# Patient Record
Sex: MALE | Race: WHITE | NOT HISPANIC OR LATINO | Employment: OTHER | URBAN - METROPOLITAN AREA
[De-identification: names, ages, dates, MRNs, and addresses within clinical notes are randomized per-mention and may not be internally consistent; named-entity substitution may affect disease eponyms.]

---

## 2023-12-22 ENCOUNTER — HOSPITAL ENCOUNTER (OUTPATIENT)
Facility: OTHER | Age: 88
Discharge: HOME | End: 2023-12-26
Attending: EMERGENCY MEDICINE | Admitting: HOSPITALIST
Payer: MEDICARE

## 2023-12-22 DIAGNOSIS — R33.9 URINARY RETENTION: ICD-10-CM

## 2023-12-22 DIAGNOSIS — K62.89 RECTAL PAIN: Primary | ICD-10-CM

## 2023-12-22 DIAGNOSIS — K59.00 CONSTIPATION: ICD-10-CM

## 2023-12-22 DIAGNOSIS — R53.1 WEAKNESS: ICD-10-CM

## 2023-12-22 DIAGNOSIS — E87.1 HYPONATREMIA: ICD-10-CM

## 2023-12-22 PROBLEM — I10 PRIMARY HYPERTENSION: Status: ACTIVE | Noted: 2023-12-22

## 2023-12-22 LAB
ALBUMIN SERPL BCP-MCNC: 3.8 G/DL (ref 3.5–5.2)
ALP SERPL-CCNC: 86 U/L (ref 55–135)
ALT SERPL W/O P-5'-P-CCNC: 20 U/L (ref 10–44)
ANION GAP SERPL CALC-SCNC: 14 MMOL/L (ref 8–16)
AST SERPL-CCNC: 26 U/L (ref 10–40)
BASOPHILS # BLD AUTO: 0.03 K/UL (ref 0–0.2)
BASOPHILS NFR BLD: 0.2 % (ref 0–1.9)
BILIRUB SERPL-MCNC: 0.9 MG/DL (ref 0.1–1)
BILIRUB UR QL STRIP: NEGATIVE
BUN SERPL-MCNC: 12 MG/DL (ref 8–23)
CALCIUM SERPL-MCNC: 9.3 MG/DL (ref 8.7–10.5)
CHLORIDE SERPL-SCNC: 95 MMOL/L (ref 95–110)
CLARITY UR: CLEAR
CO2 SERPL-SCNC: 18 MMOL/L (ref 23–29)
COLOR UR: COLORLESS
CREAT SERPL-MCNC: 1.3 MG/DL (ref 0.5–1.4)
CTP QC/QA: YES
CTP QC/QA: YES
DIFFERENTIAL METHOD: ABNORMAL
EOSINOPHIL # BLD AUTO: 0 K/UL (ref 0–0.5)
EOSINOPHIL NFR BLD: 0.1 % (ref 0–8)
ERYTHROCYTE [DISTWIDTH] IN BLOOD BY AUTOMATED COUNT: 12.2 % (ref 11.5–14.5)
EST. GFR  (NO RACE VARIABLE): 52 ML/MIN/1.73 M^2
GLUCOSE SERPL-MCNC: 103 MG/DL (ref 70–110)
GLUCOSE UR QL STRIP: NEGATIVE
HCT VFR BLD AUTO: 41.2 % (ref 40–54)
HGB BLD-MCNC: 14.1 G/DL (ref 14–18)
HGB UR QL STRIP: ABNORMAL
IMM GRANULOCYTES # BLD AUTO: 0.05 K/UL (ref 0–0.04)
IMM GRANULOCYTES NFR BLD AUTO: 0.3 % (ref 0–0.5)
KETONES UR QL STRIP: NEGATIVE
LEUKOCYTE ESTERASE UR QL STRIP: NEGATIVE
LIPASE SERPL-CCNC: 59 U/L (ref 4–60)
LYMPHOCYTES # BLD AUTO: 1.6 K/UL (ref 1–4.8)
LYMPHOCYTES NFR BLD: 10.8 % (ref 18–48)
MCH RBC QN AUTO: 30.7 PG (ref 27–31)
MCHC RBC AUTO-ENTMCNC: 34.2 G/DL (ref 32–36)
MCV RBC AUTO: 90 FL (ref 82–98)
MICROSCOPIC COMMENT: NORMAL
MONOCYTES # BLD AUTO: 0.8 K/UL (ref 0.3–1)
MONOCYTES NFR BLD: 5.1 % (ref 4–15)
NEUTROPHILS # BLD AUTO: 12.3 K/UL (ref 1.8–7.7)
NEUTROPHILS NFR BLD: 83.5 % (ref 38–73)
NITRITE UR QL STRIP: NEGATIVE
NRBC BLD-RTO: 0 /100 WBC
PH UR STRIP: 7 [PH] (ref 5–8)
PLATELET # BLD AUTO: 400 K/UL (ref 150–450)
PMV BLD AUTO: 9.5 FL (ref 9.2–12.9)
POC MOLECULAR INFLUENZA A AGN: NEGATIVE
POC MOLECULAR INFLUENZA B AGN: NEGATIVE
POCT GLUCOSE: 97 MG/DL (ref 70–110)
POTASSIUM SERPL-SCNC: 4.6 MMOL/L (ref 3.5–5.1)
PROT SERPL-MCNC: 7.2 G/DL (ref 6–8.4)
PROT UR QL STRIP: NEGATIVE
RBC # BLD AUTO: 4.59 M/UL (ref 4.6–6.2)
RBC #/AREA URNS HPF: 1 /HPF (ref 0–4)
SARS-COV-2 RDRP RESP QL NAA+PROBE: NEGATIVE
SODIUM SERPL-SCNC: 127 MMOL/L (ref 136–145)
SP GR UR STRIP: <1.005 (ref 1–1.03)
SQUAMOUS #/AREA URNS HPF: 0 /HPF
URN SPEC COLLECT METH UR: ABNORMAL
UROBILINOGEN UR STRIP-ACNC: NEGATIVE EU/DL
WBC # BLD AUTO: 14.69 K/UL (ref 3.9–12.7)
WBC #/AREA URNS HPF: 0 /HPF (ref 0–5)

## 2023-12-22 PROCEDURE — P9612 CATHETERIZE FOR URINE SPEC: HCPCS

## 2023-12-22 PROCEDURE — 51702 INSERT TEMP BLADDER CATH: CPT

## 2023-12-22 PROCEDURE — 99285 EMERGENCY DEPT VISIT HI MDM: CPT | Mod: 25

## 2023-12-22 PROCEDURE — G0378 HOSPITAL OBSERVATION PER HR: HCPCS

## 2023-12-22 PROCEDURE — 96375 TX/PRO/DX INJ NEW DRUG ADDON: CPT | Mod: 59

## 2023-12-22 PROCEDURE — 81000 URINALYSIS NONAUTO W/SCOPE: CPT | Performed by: NURSE PRACTITIONER

## 2023-12-22 PROCEDURE — 82962 GLUCOSE BLOOD TEST: CPT

## 2023-12-22 PROCEDURE — 80053 COMPREHEN METABOLIC PANEL: CPT | Performed by: NURSE PRACTITIONER

## 2023-12-22 PROCEDURE — 63600175 PHARM REV CODE 636 W HCPCS: Performed by: NURSE PRACTITIONER

## 2023-12-22 PROCEDURE — 25500020 PHARM REV CODE 255: Performed by: EMERGENCY MEDICINE

## 2023-12-22 PROCEDURE — 96361 HYDRATE IV INFUSION ADD-ON: CPT

## 2023-12-22 PROCEDURE — 83690 ASSAY OF LIPASE: CPT | Performed by: NURSE PRACTITIONER

## 2023-12-22 PROCEDURE — 87635 SARS-COV-2 COVID-19 AMP PRB: CPT | Performed by: NURSE PRACTITIONER

## 2023-12-22 PROCEDURE — 25000003 PHARM REV CODE 250: Performed by: NURSE PRACTITIONER

## 2023-12-22 PROCEDURE — 85025 COMPLETE CBC W/AUTO DIFF WBC: CPT | Performed by: NURSE PRACTITIONER

## 2023-12-22 PROCEDURE — 96361 HYDRATE IV INFUSION ADD-ON: CPT | Mod: 59

## 2023-12-22 PROCEDURE — 96374 THER/PROPH/DIAG INJ IV PUSH: CPT | Mod: 59

## 2023-12-22 PROCEDURE — 87502 INFLUENZA DNA AMP PROBE: CPT

## 2023-12-22 RX ORDER — TAMSULOSIN HYDROCHLORIDE 0.4 MG/1
0.4 CAPSULE ORAL DAILY
Status: DISCONTINUED | OUTPATIENT
Start: 2023-12-23 | End: 2023-12-26 | Stop reason: HOSPADM

## 2023-12-22 RX ORDER — KETOROLAC TROMETHAMINE 30 MG/ML
15 INJECTION, SOLUTION INTRAMUSCULAR; INTRAVENOUS
Status: COMPLETED | OUTPATIENT
Start: 2023-12-22 | End: 2023-12-22

## 2023-12-22 RX ORDER — HEPARIN SODIUM 5000 [USP'U]/ML
5000 INJECTION, SOLUTION INTRAVENOUS; SUBCUTANEOUS EVERY 8 HOURS
Status: DISCONTINUED | OUTPATIENT
Start: 2023-12-23 | End: 2023-12-26 | Stop reason: HOSPADM

## 2023-12-22 RX ORDER — TALC
6 POWDER (GRAM) TOPICAL NIGHTLY PRN
Status: DISCONTINUED | OUTPATIENT
Start: 2023-12-22 | End: 2023-12-22

## 2023-12-22 RX ORDER — ACETAMINOPHEN 325 MG/1
650 TABLET ORAL EVERY 4 HOURS PRN
Status: DISCONTINUED | OUTPATIENT
Start: 2023-12-22 | End: 2023-12-26 | Stop reason: HOSPADM

## 2023-12-22 RX ORDER — SODIUM CHLORIDE 9 MG/ML
INJECTION, SOLUTION INTRAVENOUS CONTINUOUS
Status: DISCONTINUED | OUTPATIENT
Start: 2023-12-22 | End: 2023-12-23

## 2023-12-22 RX ORDER — NALOXONE HCL 0.4 MG/ML
0.02 VIAL (ML) INJECTION
Status: DISCONTINUED | OUTPATIENT
Start: 2023-12-22 | End: 2023-12-26 | Stop reason: HOSPADM

## 2023-12-22 RX ORDER — FINASTERIDE 5 MG/1
5 TABLET, FILM COATED ORAL DAILY
Status: DISCONTINUED | OUTPATIENT
Start: 2023-12-23 | End: 2023-12-26 | Stop reason: HOSPADM

## 2023-12-22 RX ORDER — PRAVASTATIN SODIUM 20 MG/1
20 TABLET ORAL DAILY
Status: DISCONTINUED | OUTPATIENT
Start: 2023-12-23 | End: 2023-12-26 | Stop reason: HOSPADM

## 2023-12-22 RX ORDER — MORPHINE SULFATE 2 MG/ML
2 INJECTION, SOLUTION INTRAMUSCULAR; INTRAVENOUS EVERY 4 HOURS PRN
Status: DISCONTINUED | OUTPATIENT
Start: 2023-12-22 | End: 2023-12-23

## 2023-12-22 RX ORDER — SODIUM CHLORIDE 0.9 % (FLUSH) 0.9 %
10 SYRINGE (ML) INJECTION EVERY 8 HOURS PRN
Status: DISCONTINUED | OUTPATIENT
Start: 2023-12-22 | End: 2023-12-23

## 2023-12-22 RX ORDER — ONDANSETRON 2 MG/ML
4 INJECTION INTRAMUSCULAR; INTRAVENOUS EVERY 8 HOURS PRN
Status: DISCONTINUED | OUTPATIENT
Start: 2023-12-22 | End: 2023-12-26 | Stop reason: HOSPADM

## 2023-12-22 RX ORDER — AMLODIPINE BESYLATE 5 MG/1
5 TABLET ORAL 2 TIMES DAILY
Status: DISCONTINUED | OUTPATIENT
Start: 2023-12-22 | End: 2023-12-26 | Stop reason: HOSPADM

## 2023-12-22 RX ORDER — POLYETHYLENE GLYCOL 3350 17 G/17G
17 POWDER, FOR SOLUTION ORAL DAILY
Status: DISCONTINUED | OUTPATIENT
Start: 2023-12-23 | End: 2023-12-26 | Stop reason: HOSPADM

## 2023-12-22 RX ORDER — SODIUM CHLORIDE 0.9 % (FLUSH) 0.9 %
10 SYRINGE (ML) INJECTION
Status: DISCONTINUED | OUTPATIENT
Start: 2023-12-22 | End: 2023-12-23

## 2023-12-22 RX ORDER — BISACODYL 10 MG
10 SUPPOSITORY, RECTAL RECTAL DAILY PRN
Status: DISCONTINUED | OUTPATIENT
Start: 2023-12-22 | End: 2023-12-23

## 2023-12-22 RX ORDER — MORPHINE SULFATE 2 MG/ML
2 INJECTION, SOLUTION INTRAMUSCULAR; INTRAVENOUS ONCE
Status: COMPLETED | OUTPATIENT
Start: 2023-12-22 | End: 2023-12-22

## 2023-12-22 RX ORDER — NAPROXEN SODIUM 220 MG/1
81 TABLET, FILM COATED ORAL DAILY
Status: DISCONTINUED | OUTPATIENT
Start: 2023-12-23 | End: 2023-12-26 | Stop reason: HOSPADM

## 2023-12-22 RX ADMIN — IOHEXOL 75 ML: 350 INJECTION, SOLUTION INTRAVENOUS at 08:12

## 2023-12-22 RX ADMIN — SODIUM CHLORIDE: 9 INJECTION, SOLUTION INTRAVENOUS at 11:12

## 2023-12-22 RX ADMIN — KETOROLAC TROMETHAMINE 15 MG: 30 INJECTION, SOLUTION INTRAMUSCULAR at 10:12

## 2023-12-22 RX ADMIN — SODIUM CHLORIDE, POTASSIUM CHLORIDE, SODIUM LACTATE AND CALCIUM CHLORIDE 1000 ML: 600; 310; 30; 20 INJECTION, SOLUTION INTRAVENOUS at 07:12

## 2023-12-22 RX ADMIN — AMLODIPINE BESYLATE 5 MG: 5 TABLET ORAL at 11:12

## 2023-12-22 RX ADMIN — MORPHINE SULFATE 2 MG: 2 INJECTION, SOLUTION INTRAMUSCULAR; INTRAVENOUS at 09:12

## 2023-12-23 PROBLEM — R53.1 WEAKNESS: Status: ACTIVE | Noted: 2023-12-23

## 2023-12-23 PROBLEM — R33.9 URINARY RETENTION: Status: ACTIVE | Noted: 2023-12-23

## 2023-12-23 PROBLEM — K59.00 CONSTIPATION: Status: ACTIVE | Noted: 2023-12-22

## 2023-12-23 LAB
ALBUMIN SERPL BCP-MCNC: 3.6 G/DL (ref 3.5–5.2)
ALP SERPL-CCNC: 79 U/L (ref 55–135)
ALT SERPL W/O P-5'-P-CCNC: 19 U/L (ref 10–44)
ANION GAP SERPL CALC-SCNC: 10 MMOL/L (ref 8–16)
AST SERPL-CCNC: 29 U/L (ref 10–40)
BASOPHILS # BLD AUTO: 0.06 K/UL (ref 0–0.2)
BASOPHILS NFR BLD: 0.6 % (ref 0–1.9)
BILIRUB SERPL-MCNC: 0.9 MG/DL (ref 0.1–1)
BUN SERPL-MCNC: 11 MG/DL (ref 8–23)
CALCIUM SERPL-MCNC: 9.3 MG/DL (ref 8.7–10.5)
CHLORIDE SERPL-SCNC: 103 MMOL/L (ref 95–110)
CO2 SERPL-SCNC: 23 MMOL/L (ref 23–29)
CREAT SERPL-MCNC: 1.2 MG/DL (ref 0.5–1.4)
DIFFERENTIAL METHOD: ABNORMAL
EOSINOPHIL # BLD AUTO: 0 K/UL (ref 0–0.5)
EOSINOPHIL NFR BLD: 0.4 % (ref 0–8)
ERYTHROCYTE [DISTWIDTH] IN BLOOD BY AUTOMATED COUNT: 12.5 % (ref 11.5–14.5)
EST. GFR  (NO RACE VARIABLE): 57 ML/MIN/1.73 M^2
FOLATE SERPL-MCNC: 12 NG/ML (ref 4–24)
GLUCOSE SERPL-MCNC: 92 MG/DL (ref 70–110)
HCT VFR BLD AUTO: 43 % (ref 40–54)
HGB BLD-MCNC: 14.7 G/DL (ref 14–18)
IMM GRANULOCYTES # BLD AUTO: 0.03 K/UL (ref 0–0.04)
IMM GRANULOCYTES NFR BLD AUTO: 0.3 % (ref 0–0.5)
LYMPHOCYTES # BLD AUTO: 1.6 K/UL (ref 1–4.8)
LYMPHOCYTES NFR BLD: 15.3 % (ref 18–48)
MAGNESIUM SERPL-MCNC: 2.2 MG/DL (ref 1.6–2.6)
MCH RBC QN AUTO: 31.2 PG (ref 27–31)
MCHC RBC AUTO-ENTMCNC: 34.2 G/DL (ref 32–36)
MCV RBC AUTO: 91 FL (ref 82–98)
MONOCYTES # BLD AUTO: 0.9 K/UL (ref 0.3–1)
MONOCYTES NFR BLD: 8.5 % (ref 4–15)
NEUTROPHILS # BLD AUTO: 7.8 K/UL (ref 1.8–7.7)
NEUTROPHILS NFR BLD: 74.9 % (ref 38–73)
NRBC BLD-RTO: 0 /100 WBC
PHOSPHATE SERPL-MCNC: 3.3 MG/DL (ref 2.7–4.5)
PLATELET # BLD AUTO: 368 K/UL (ref 150–450)
PMV BLD AUTO: 10.1 FL (ref 9.2–12.9)
POTASSIUM SERPL-SCNC: 4 MMOL/L (ref 3.5–5.1)
PROT SERPL-MCNC: 6.5 G/DL (ref 6–8.4)
RBC # BLD AUTO: 4.71 M/UL (ref 4.6–6.2)
SODIUM SERPL-SCNC: 136 MMOL/L (ref 136–145)
T4 FREE SERPL-MCNC: 0.88 NG/DL (ref 0.71–1.51)
T4 SERPL-MCNC: 5.5 UG/DL (ref 4.5–11.5)
TSH SERPL DL<=0.005 MIU/L-ACNC: 8.78 UIU/ML (ref 0.4–4)
VIT B12 SERPL-MCNC: 731 PG/ML (ref 210–950)
WBC # BLD AUTO: 10.41 K/UL (ref 3.9–12.7)

## 2023-12-23 PROCEDURE — 97535 SELF CARE MNGMENT TRAINING: CPT

## 2023-12-23 PROCEDURE — 97110 THERAPEUTIC EXERCISES: CPT

## 2023-12-23 PROCEDURE — 82746 ASSAY OF FOLIC ACID SERUM: CPT | Performed by: NURSE PRACTITIONER

## 2023-12-23 PROCEDURE — G0378 HOSPITAL OBSERVATION PER HR: HCPCS

## 2023-12-23 PROCEDURE — 84443 ASSAY THYROID STIM HORMONE: CPT | Performed by: NURSE PRACTITIONER

## 2023-12-23 PROCEDURE — 96372 THER/PROPH/DIAG INJ SC/IM: CPT | Mod: 59 | Performed by: NURSE PRACTITIONER

## 2023-12-23 PROCEDURE — 97530 THERAPEUTIC ACTIVITIES: CPT

## 2023-12-23 PROCEDURE — 96361 HYDRATE IV INFUSION ADD-ON: CPT | Mod: 59

## 2023-12-23 PROCEDURE — 25000003 PHARM REV CODE 250: Performed by: NURSE PRACTITIONER

## 2023-12-23 PROCEDURE — 84100 ASSAY OF PHOSPHORUS: CPT | Performed by: NURSE PRACTITIONER

## 2023-12-23 PROCEDURE — 85025 COMPLETE CBC W/AUTO DIFF WBC: CPT | Performed by: NURSE PRACTITIONER

## 2023-12-23 PROCEDURE — 82607 VITAMIN B-12: CPT | Performed by: NURSE PRACTITIONER

## 2023-12-23 PROCEDURE — 36415 COLL VENOUS BLD VENIPUNCTURE: CPT | Performed by: NURSE PRACTITIONER

## 2023-12-23 PROCEDURE — 63600175 PHARM REV CODE 636 W HCPCS: Performed by: NURSE PRACTITIONER

## 2023-12-23 PROCEDURE — 97166 OT EVAL MOD COMPLEX 45 MIN: CPT

## 2023-12-23 PROCEDURE — 84439 ASSAY OF FREE THYROXINE: CPT | Performed by: NURSE PRACTITIONER

## 2023-12-23 PROCEDURE — 97116 GAIT TRAINING THERAPY: CPT

## 2023-12-23 PROCEDURE — 96372 THER/PROPH/DIAG INJ SC/IM: CPT | Performed by: NURSE PRACTITIONER

## 2023-12-23 PROCEDURE — 80053 COMPREHEN METABOLIC PANEL: CPT | Performed by: NURSE PRACTITIONER

## 2023-12-23 PROCEDURE — 84436 ASSAY OF TOTAL THYROXINE: CPT | Performed by: NURSE PRACTITIONER

## 2023-12-23 PROCEDURE — 97161 PT EVAL LOW COMPLEX 20 MIN: CPT

## 2023-12-23 PROCEDURE — 83735 ASSAY OF MAGNESIUM: CPT | Performed by: NURSE PRACTITIONER

## 2023-12-23 RX ORDER — BISACODYL 10 MG
10 SUPPOSITORY, RECTAL RECTAL DAILY
Status: DISCONTINUED | OUTPATIENT
Start: 2023-12-23 | End: 2023-12-26 | Stop reason: HOSPADM

## 2023-12-23 RX ORDER — SIMETHICONE 80 MG
1 TABLET,CHEWABLE ORAL
Status: DISCONTINUED | OUTPATIENT
Start: 2023-12-23 | End: 2023-12-26 | Stop reason: HOSPADM

## 2023-12-23 RX ADMIN — AMLODIPINE BESYLATE 5 MG: 5 TABLET ORAL at 10:12

## 2023-12-23 RX ADMIN — AMLODIPINE BESYLATE 5 MG: 5 TABLET ORAL at 09:12

## 2023-12-23 RX ADMIN — HEPARIN SODIUM 5000 UNITS: 5000 INJECTION INTRAVENOUS; SUBCUTANEOUS at 03:12

## 2023-12-23 RX ADMIN — PRAVASTATIN SODIUM 20 MG: 20 TABLET ORAL at 10:12

## 2023-12-23 RX ADMIN — FINASTERIDE 5 MG: 5 TABLET, FILM COATED ORAL at 10:12

## 2023-12-23 RX ADMIN — HEPARIN SODIUM 5000 UNITS: 5000 INJECTION INTRAVENOUS; SUBCUTANEOUS at 05:12

## 2023-12-23 RX ADMIN — HEPARIN SODIUM 5000 UNITS: 5000 INJECTION INTRAVENOUS; SUBCUTANEOUS at 09:12

## 2023-12-23 RX ADMIN — SIMETHICONE 80 MG: 80 TABLET, CHEWABLE ORAL at 03:12

## 2023-12-23 RX ADMIN — Medication 1 ENEMA: at 08:12

## 2023-12-23 RX ADMIN — BISACODYL 10 MG: 10 SUPPOSITORY RECTAL at 07:12

## 2023-12-23 RX ADMIN — TAMSULOSIN HYDROCHLORIDE 0.4 MG: 0.4 CAPSULE ORAL at 10:12

## 2023-12-23 RX ADMIN — POLYETHYLENE GLYCOL 3350 17 G: 17 POWDER, FOR SOLUTION ORAL at 10:12

## 2023-12-23 RX ADMIN — ASPIRIN 81 MG CHEWABLE TABLET 81 MG: 81 TABLET CHEWABLE at 10:12

## 2023-12-23 RX ADMIN — SIMETHICONE 80 MG: 80 TABLET, CHEWABLE ORAL at 09:12

## 2023-12-23 NOTE — ASSESSMENT & PLAN NOTE
Patient uses walker at baseline. His son reports that yesterday he was unable to get off of toilet and ambulate. PT/OT consulted.

## 2023-12-23 NOTE — PLAN OF CARE
Problem: Physical Therapy  Goal: Physical Therapy Goal  Description: Goals to be met by: 2024    Patient will increase functional independence with mobility by performin. Sit<>stand with SBA with RW.  2. Gait x 20 ft feet with RW with SBA.  3. Supine<>sit with SBA.      Outcome: Ongoing, Progressing   Orders were received and Physical Therapy evaluation completed.  Pt required CGA to transfer supine<>sit.  He required Min assist to transfer sit to stand with a RW.  He was able to ambulate 8 ft with a RW and CGA.  Pt also performed seated knee extension and seated marching 10 x 2 with MORELIA Hodges.

## 2023-12-23 NOTE — PROGRESS NOTES
[facility] Medicine  Progress Note    Patient Name: Dimitri Johnson  MRN: [id number]  Patient Class: OP- Observation   Admission Date: 12/22/2023  Length of Stay: 0 days  Attending Physician: Michelle Gilbert MD  Primary Care Provider: Everette Neville MD        Subjective:     Principal Problem:Constipation        HPI:  No notes on file    Overview/Hospital Course:  No notes on file    Interval History: Patient reports episodes of tenesmus but unable to get to a toilet. He denies abdominal pain. Discussed with son. Per son, the patient uses a walker but was unable to walk at all yesterday and it is a new problem. Patient had an enema at [facility] 3 days ago, felt  better, and was discharged. Family brought him yesterday because he was screaming from pain and could not walk     Review of Systems   Constitutional:  Positive for activity change.   HENT:  Negative for congestion.    Respiratory:  Negative for choking and shortness of breath.    Cardiovascular:  Negative for chest pain and leg swelling.   Gastrointestinal:  Positive for abdominal distention and constipation. Negative for abdominal pain.   Genitourinary:  Negative for difficulty urinating.   Musculoskeletal:  Negative for arthralgias and back pain.        Uses walker   Neurological:  Positive for weakness.     Objective:     Vital Signs (Most Recent):  Temp: 98 °F (36.7 °C) (12/23/23 0858)  Pulse: 99 (12/23/23 0858)  Resp: 16 (12/23/23 0858)  BP: (!) 164/91 (12/23/23 0858)  SpO2: (!) 94 % (12/23/23 0858) Vital Signs (24h Range):  Temp:  [98 °F (36.7 °C)-98.3 °F (36.8 °C)] 98 °F (36.7 °C)  Pulse:  [] 99  Resp:  [15-28] 16  SpO2:  [94 %-100 %] 94 %  BP: (144-187)/(65-98) 164/91     Weight: 61.6 kg (135 lb 12.9 oz)  Body mass index is 18.94 kg/m².    Intake/Output Summary (Last 24 hours) at 12/23/2023 1155  Last data filed at 12/23/2023 0620  Gross per 24 hour   Intake 1532.59 ml   Output 4800 ml   Net -3267.41 ml         Physical  Exam  Vitals reviewed.   HENT:      Head: Normocephalic.   Eyes:      Comments: Right eye abnormal   Cardiovascular:      Rate and Rhythm: Normal rate.   Pulmonary:      Effort: Pulmonary effort is normal. No respiratory distress.   Abdominal:      General: There is distension.      Tenderness: There is abdominal tenderness (with palpation midline).   Musculoskeletal:      Right lower leg: No edema.      Left lower leg: No edema.   Skin:     General: Skin is dry.   Neurological:      Mental Status: He is alert and oriented to person, place, and time.      Cranial Nerves: Cranial nerve deficit present.      Comments: Muscle strength 4/5 BLLE   Psychiatric:         Mood and Affect: Mood normal.         Behavior: Behavior normal.             Significant Labs: All pertinent labs within the past 24 hours have been reviewed.  BMP:   Recent Labs   Lab 12/23/23  0509   GLU 92      K 4.0      CO2 23   BUN 11   CREATININE 1.2   CALCIUM 9.3   MG 2.2     CBC:   Recent Labs   Lab 12/22/23  1843 12/23/23  0509   WBC 14.69* 10.41   HGB 14.1 14.7   HCT 41.2 43.0    368     CMP:   Recent Labs   Lab 12/22/23  1843 12/23/23  0509   * 136   K 4.6 4.0   CL 95 103   CO2 18* 23    92   BUN 12 11   CREATININE 1.3 1.2   CALCIUM 9.3 9.3   PROT 7.2 6.5   ALBUMIN 3.8 3.6   BILITOT 0.9 0.9   ALKPHOS 86 79   AST 26 29   ALT 20 19   ANIONGAP 14 10       Significant Imaging: I have reviewed all pertinent imaging results/findings within the past 24 hours.  CT Abdomen Pelvis With IV Contrast NO Oral Contrast  Narrative: EXAMINATION:  CT OF ABDOMEN PELVIS WITH    CLINICAL HISTORY:  Abdominal pain, acute, nonlocalized;    TECHNIQUE:  5 mm enhanced axial images were obtained from the lung bases through the greater trochanters.   mL of Omnipaque 350 was injected.    COMPARISON:  None.    FINDINGS:  Examination is limited by motion artifact.  The liver, spleen, pancreas, right kidney and adrenal glands are unremarkable.  The gallbladder contains no calcified gallstones.    There is a left renal cyst.    There is no definite evidence for abdominal adenopathy or ascites.  Moderate vascular congestion is present.    There are no pelvic masses or adenopathy.  The prostate gland measures up to 5.5 cm in maximal diameter.  Consider correlation with PSA.  There is a Ramirez catheter with its tip in the urinary bladder.  Moderately large liquid stool is seen within the patulous rectum.    There is no free fluid in the pelvis.    There is mild bibasilar atelectasis.    Diffuse idiopathic skeletal hyperostosis is seen involving the thoracic spine.  There are some degenerative changes particularly at L4/L5 including intervertebral disc space narrowing, vacuum phenomena, and marginal osteophytes.  There are osseous changes, which are mixed lytic and sclerotic changes of the spine.  Impression: Limited examination. Moderately large liquid stool within the patulous rectum.  Question possible diarrheal illness and/or fecal impaction.    Left renal cyst.    Prostatomegaly.    Osseous changes particularly of the spine.    Electronically signed by: Jackie Ibarra  Date:    12/22/2023  Time:    21:30  X-Ray Abdomen AP 1 View (KUB)  Narrative: EXAMINATION:  ABDOMEN AP    CLINICAL HISTORY:  Constipation, unspecified    TECHNIQUE:  Abdomen AP one view    COMPARISON:  None.    FINDINGS:  Single AP view of the abdomen demonstrates a nonspecific bowel gas pattern. No dilated loops small bowel or air-fluid levels are detected.  There are gaseous distension of the bowel.  No excessive fecal material is detected  Impression: Nonspecific bowel gas pattern.    Electronically signed by: Jackie Ibarra  Date:    12/22/2023  Time:    19:34        Assessment/Plan:      * Constipation  CT- Limited examination. Moderately large liquid stool within the patulous rectum.  Question possible diarrheal illness and/or fecal impaction.  Left renal cyst.     Prostatomegaly.   Osseous changes particularly of the spine.    IVF discontinued  Miralax daily, Dulcolax daily  Encourge oral intake  Use BSC    Urinary retention  Over 500 ml of urine in bladder on scan. Ramirez placed  in ED.      Weakness  Patient uses walker at baseline. His son reports that yesterday he was unable to get off of toilet and ambulate. PT/OT consulted.       Primary hypertension  Chronic, controlled. Latest blood pressure and vitals reviewed-     Temp:  [98.3 °F (36.8 °C)]   Pulse:  []   Resp:  [15-28]   BP: (144-187)/(71-98)   SpO2:  [97 %-100 %] .   Home meds for hypertension were reviewed and noted below.   Hypertension Medications               amlodipine (NORVASC) 5 MG tablet Take 5 mg by mouth 2 (two) times daily.            While in the hospital, will manage blood pressure as follows; Continue home antihypertensive regimen    Will utilize p.r.n. blood pressure medication only if patient's blood pressure greater than 180/110 and he develops symptoms such as worsening chest pain or shortness of breath.      VTE Risk Mitigation (From admission, onward)           Ordered     heparin (porcine) injection 5,000 Units  Every 8 hours         12/22/23 2203     IP VTE HIGH RISK PATIENT  Once         12/22/23 2203     Place sequential compression device  Until discontinued         12/22/23 2203                    Discharge Planning   PALOMO:      Code Status: Full Code   Is the patient medically ready for discharge?:     Reason for patient still in hospital (select all that apply): Patient trending condition and Treatment  Discharge Plan A: Home with family                  Maria Del Rosario Walls DNP  Department of Hospital Medicine   [facility])

## 2023-12-23 NOTE — PT/OT/SLP EVAL
Physical Therapy Evaluation    Patient Name:  Dimitri Johnson   MRN:  [id number]    Recommendations:     Discharge Recommendations: Low Intensity Therapy   Discharge Equipment Recommendations: bedside commode   Barriers to discharge: Inaccessible home and Decreased caregiver support    Assessment:     Dimitri Johnson is a 89 y.o. male admitted with a medical diagnosis of Constipation.  He presents with the following impairments/functional limitations: weakness, impaired endurance, impaired self care skills, impaired functional mobility, gait instability, impaired balance, decreased lower extremity function, decreased safety awareness, impaired joint extensibility. Orders were received and Physical Therapy evaluation completed.  Pt required CGA to transfer supine<>sit.  He required Min assist to transfer sit to stand with a RW.  He was able to ambulate 8 ft with a RW and CGA.  Pt also performed seated knee extension and seated marching 10 x 2 with MORELIA GONZALEZ's..    Rehab Prognosis: Good; patient would benefit from acute skilled PT services to address these deficits and reach maximum level of function.    Recent Surgery: * No surgery found *      Plan:     During this hospitalization, patient to be seen 5 x/week to address the identified rehab impairments via gait training, therapeutic activities, therapeutic exercises, neuromuscular re-education and progress toward the following goals:    Plan of Care Expires:  01/23/24    Subjective     Chief Complaint: inability to defecate  Patient/Family Comments/goals: Pt agreeable to evaluation  Pain/Comfort:  Pain Rating 1: 0/10    Patients cultural, spiritual, Quaker conflicts given the current situation: no    Living Environment:  Pt reported that he lives in a two story home with his son and daughter.  Prior to admission, patients level of function was in need of assistance, ambulating with a RW and using a stair lift.  Equipment used at home: walker, rolling, cane, quad, other (see  comments) (Stair lift).  DME owned (not currently used): none.  Upon discharge, patient will have assistance from Son and Daughter.    Objective:     Communicated with nurse prior to session.  Patient found HOB elevated with bed alarm, peripheral IV, gray catheter  upon PT entry to room.    General Precautions: Standard, fall  Orthopedic Precautions:N/A   Braces: N/A  Respiratory Status: Room air    Exams:  Cognitive Exam:  Patient is oriented to Person, Place, Time, and Situation  Sensation:    -       Intact  RLE ROM: WFL  RLE Strength: WFL except hip flexion, knee extension 3/5.  knee flexion 2/5   LLE ROM: WFL  LLE Strength: WFL    Functional Mobility:  Bed Mobility:     Rolling Left:  contact guard assistance  Supine to Sit: contact guard assistance  Sit to Supine: contact guard assistance  Transfers:     Sit to Stand:  minimum assistance with rolling walker  Gait: Pt was able to ambulate 8 ft with a RW and CGA      AM-PAC 6 CLICK MOBILITY  Total Score:17       Treatment & Education:  Role of Physical Therapy  Plan of Care  Transfer training  Gait training  Therapeutic exercise including seated knee extension and seated marching 10 x 2 with B LE's  Patient left HOB elevated with all lines intact, call button in reach, bed alarm on, and Nurse notified.    GOALS:   Multidisciplinary Problems       Physical Therapy Goals          Problem: Physical Therapy    Goal Priority Disciplines Outcome Goal Variances Interventions   Physical Therapy Goal     PT, PT/OT Ongoing, Progressing     Description: Goals to be met by: 2024    Patient will increase functional independence with mobility by performin. Sit<>stand with SBA with RW.  2. Gait x 20 ft feet with RW with SBA.  3. Supine<>sit with SBA.                           History:     Past Medical History:   Diagnosis Date    Glaucoma     Hypertension        Past Surgical History:   Procedure Laterality Date    CORNEAL TRANSPLANT Right     CORNEAL TRANSPLANT  Left 7/17/14    [facility]       Time Tracking:     PT Received On: 12/23/23  PT Start Time: 1111     PT Stop Time: 1159  PT Total Time (min): 48 min     Billable Minutes: Evaluation 12, Gait Training 12, Therapeutic Activity 12, and Therapeutic Exercise 12      12/23/2023

## 2023-12-23 NOTE — ED NOTES
Patient c/o needing to urine. Bladder distended upon view and palpation. Bladder scan revealed >544 mL. Per ED Provider's orders, a gray was placed. Drained 500+ mL and was clamped.

## 2023-12-23 NOTE — SUBJECTIVE & OBJECTIVE
Interval History: Patient reports episodes of tenesmus but unable to get to a toilet. He denies abdominal pain. Discussed with son. Per son, the patient uses a walker but was unable to walk at all yesterday and it is a new problem. Patient had an enema at [facility] 3 days ago, felt  better, and was discharged. Family brought him yesterday because he was screaming from pain and could not walk     Review of Systems   Constitutional:  Positive for activity change.   HENT:  Negative for congestion.    Respiratory:  Negative for choking and shortness of breath.    Cardiovascular:  Negative for chest pain and leg swelling.   Gastrointestinal:  Positive for abdominal distention and constipation. Negative for abdominal pain.   Genitourinary:  Negative for difficulty urinating.   Musculoskeletal:  Negative for arthralgias and back pain.        Uses walker   Neurological:  Positive for weakness.     Objective:     Vital Signs (Most Recent):  Temp: 98 °F (36.7 °C) (12/23/23 0858)  Pulse: 99 (12/23/23 0858)  Resp: 16 (12/23/23 0858)  BP: (!) 164/91 (12/23/23 0858)  SpO2: (!) 94 % (12/23/23 0858) Vital Signs (24h Range):  Temp:  [98 °F (36.7 °C)-98.3 °F (36.8 °C)] 98 °F (36.7 °C)  Pulse:  [] 99  Resp:  [15-28] 16  SpO2:  [94 %-100 %] 94 %  BP: (144-187)/(65-98) 164/91     Weight: 61.6 kg (135 lb 12.9 oz)  Body mass index is 18.94 kg/m².    Intake/Output Summary (Last 24 hours) at 12/23/2023 1155  Last data filed at 12/23/2023 0620  Gross per 24 hour   Intake 1532.59 ml   Output 4800 ml   Net -3267.41 ml         Physical Exam  Vitals reviewed.   HENT:      Head: Normocephalic.   Eyes:      Comments: Right eye abnormal   Cardiovascular:      Rate and Rhythm: Normal rate.   Pulmonary:      Effort: Pulmonary effort is normal. No respiratory distress.   Abdominal:      General: There is distension.      Tenderness: There is abdominal tenderness (with palpation midline).   Musculoskeletal:      Right lower leg: No edema.      Left  lower leg: No edema.   Skin:     General: Skin is dry.   Neurological:      Mental Status: He is alert and oriented to person, place, and time.      Cranial Nerves: Cranial nerve deficit present.      Comments: Muscle strength 4/5 BLLE   Psychiatric:         Mood and Affect: Mood normal.         Behavior: Behavior normal.             Significant Labs: All pertinent labs within the past 24 hours have been reviewed.  BMP:   Recent Labs   Lab 12/23/23  0509   GLU 92      K 4.0      CO2 23   BUN 11   CREATININE 1.2   CALCIUM 9.3   MG 2.2     CBC:   Recent Labs   Lab 12/22/23  1843 12/23/23  0509   WBC 14.69* 10.41   HGB 14.1 14.7   HCT 41.2 43.0    368     CMP:   Recent Labs   Lab 12/22/23  1843 12/23/23  0509   * 136   K 4.6 4.0   CL 95 103   CO2 18* 23    92   BUN 12 11   CREATININE 1.3 1.2   CALCIUM 9.3 9.3   PROT 7.2 6.5   ALBUMIN 3.8 3.6   BILITOT 0.9 0.9   ALKPHOS 86 79   AST 26 29   ALT 20 19   ANIONGAP 14 10       Significant Imaging: I have reviewed all pertinent imaging results/findings within the past 24 hours.  CT Abdomen Pelvis With IV Contrast NO Oral Contrast  Narrative: EXAMINATION:  CT OF ABDOMEN PELVIS WITH    CLINICAL HISTORY:  Abdominal pain, acute, nonlocalized;    TECHNIQUE:  5 mm enhanced axial images were obtained from the lung bases through the greater trochanters.   mL of Omnipaque 350 was injected.    COMPARISON:  None.    FINDINGS:  Examination is limited by motion artifact.  The liver, spleen, pancreas, right kidney and adrenal glands are unremarkable. The gallbladder contains no calcified gallstones.    There is a left renal cyst.    There is no definite evidence for abdominal adenopathy or ascites.  Moderate vascular congestion is present.    There are no pelvic masses or adenopathy.  The prostate gland measures up to 5.5 cm in maximal diameter.  Consider correlation with PSA.  There is a Ramirez catheter with its tip in the urinary bladder.  Moderately large  liquid stool is seen within the patulous rectum.    There is no free fluid in the pelvis.    There is mild bibasilar atelectasis.    Diffuse idiopathic skeletal hyperostosis is seen involving the thoracic spine.  There are some degenerative changes particularly at L4/L5 including intervertebral disc space narrowing, vacuum phenomena, and marginal osteophytes.  There are osseous changes, which are mixed lytic and sclerotic changes of the spine.  Impression: Limited examination. Moderately large liquid stool within the patulous rectum.  Question possible diarrheal illness and/or fecal impaction.    Left renal cyst.    Prostatomegaly.    Osseous changes particularly of the spine.    Electronically signed by: Jackie Ibarra  Date:    12/22/2023  Time:    21:30  X-Ray Abdomen AP 1 View (KUB)  Narrative: EXAMINATION:  ABDOMEN AP    CLINICAL HISTORY:  Constipation, unspecified    TECHNIQUE:  Abdomen AP one view    COMPARISON:  None.    FINDINGS:  Single AP view of the abdomen demonstrates a nonspecific bowel gas pattern. No dilated loops small bowel or air-fluid levels are detected.  There are gaseous distension of the bowel.  No excessive fecal material is detected  Impression: Nonspecific bowel gas pattern.    Electronically signed by: Jackie Ibarra  Date:    12/22/2023  Time:    19:34

## 2023-12-23 NOTE — ASSESSMENT & PLAN NOTE
Chronic, controlled. Latest blood pressure and vitals reviewed-     Temp:  [98.3 °F (36.8 °C)]   Pulse:  []   Resp:  [15-28]   BP: (144-187)/(71-98)   SpO2:  [97 %-100 %] .   Home meds for hypertension were reviewed and noted below.   Hypertension Medications               amlodipine (NORVASC) 5 MG tablet Take 5 mg by mouth 2 (two) times daily.            While in the hospital, will manage blood pressure as follows; Continue home antihypertensive regimen    Will utilize p.r.n. blood pressure medication only if patient's blood pressure greater than 180/110 and he develops symptoms such as worsening chest pain or shortness of breath.

## 2023-12-23 NOTE — ED NOTES
Pt c/o lower back pain and pain in rectum, pt is anxious and uncomfortable, pillows placed under pt buttocks and behind head, pt adjusted in bed to try and help with pain and comfort. Camilla NP aware.

## 2023-12-23 NOTE — PLAN OF CARE
Initial Discharge Planning Assessment:  Patient admitted on: 12/22/23     Chart reviewed, Care plan discussed with treatment team,  attending Dr. Gilbert      PCP updated in Epic:Dr. Zimmerman at [facility]   Pharmacy, updated in Epic: Bedside      DME at home: [employer]       Current dispo: Home with family vs Home health       Transportation: Family can provide      Power of  or Living Will: Family      Anticipated DC needs from  perspective:     12/23/23 0803   Discharge Assessment   Assessment Type Discharge Planning Assessment   Confirmed/corrected address, phone number and insurance Yes   Confirmed Demographics Correct on Facesheet   Source of Information patient;health record   People in Home child(leonie), adult   Do you expect to return to your current living situation? Yes   Do you have help at home or someone to help you manage your care at home? Yes   Prior to hospitilization cognitive status: Alert/Oriented   Current cognitive status: Alert/Oriented   Walking or Climbing Stairs Difficulty yes   Walking or Climbing Stairs ambulation difficulty, requires equipment   Dressing/Bathing Difficulty no   Equipment Currently Used at Home walker, rolling;cane, straight   Readmission within 30 days? Yes  ([facility])   Patient currently being followed by outpatient case management? No   Do you currently have service(s) that help you manage your care at home? No   Do you take prescription medications? Yes   Do you have prescription coverage? Yes   Do you have any problems affording any of your prescribed medications? No   Is the patient taking medications as prescribed? yes   How do you get to doctors appointments? family or friend will provide   Are you on dialysis? No   Do you take coumadin? No   Discharge Plan A Home with family   Discharge Plan B Home Health   DME Needed Upon Discharge  none   Discharge Plan discussed with: Patient   Transition of Care Barriers None   Physical Activity   On average, how many  days per week do you engage in moderate to strenuous exercise (like a brisk walk)? 0 days   On average, how many minutes do you engage in exercise at this level? 0 min   Financial Resource Strain   How hard is it for you to pay for the very basics like food, housing, medical care, and heating? Not hard   Housing Stability   In the last 12 months, was there a time when you were not able to pay the mortgage or rent on time? N   In the last 12 months, was there a time when you did not have a steady place to sleep or slept in a shelter (including now)? N   Transportation Needs   In the past 12 months, has lack of transportation kept you from medical appointments or from getting medications? no   In the past 12 months, has lack of transportation kept you from meetings, work, or from getting things needed for daily living? No   Food Insecurity   Within the past 12 months, you worried that your food would run out before you got the money to buy more. Never true   Within the past 12 months, the food you bought just didn't last and you didn't have money to get more. Never true   Stress   Do you feel stress - tense, restless, nervous, or anxious, or unable to sleep at night because your mind is troubled all the time - these days? Only a littl   Social Connections   In a typical week, how many times do you talk on the phone with family, friends, or neighbors? Three   How often do you get together with friends or relatives? Three times   How often do you attend Mandaen or Sabianism services? Never   Do you belong to any clubs or organizations such as Mandaen groups, unions, fraternal or athletic groups, or school groups? No   How often do you attend meetings of the clubs or organizations you belong to? Never   Are you , , , , never , or living with a partner?

## 2023-12-23 NOTE — PLAN OF CARE
"Problem: Occupational Therapy  Goal: Occupational Therapy Goal  Description: Goals to be met by: 1/6/2024     Patient will increase functional independence with ADLs by performing:    UE Dressing with Stand-by Assistance.  LE Dressing with Stand-by Assistance.  Grooming while standing at sink with Stand-by Assistance.  Toileting from bedside commode with Stand-by Assistance for hygiene and clothing management.   Toilet transfer to bedside commode with Stand-by Assistance.    Outcome: Ongoing, Progressing     Initial OT eval/treat complete.  Requiring MIN A for sit<>stand from EOB and short household level ambulation.  Requiring MOD A for toilet transfer with assist for lift/lower with use of grab bar.  Pt. Reporting dizziness when going from supine>sit that subsided after good follow through for instruction to sit for a few minutes.  Increased fear of falling noted throughout household ambulation task with Pt. Stating during session, "I just feel this instability," "must lean forward," and "I'm going very slow."  Has QC, lift chair, stair lift, and RW.  Needs BSC.  Recommend post acute Moderate Intensity therapy.  Pt. Has had 2 falls in last 3 months while in bathroom with daughter and son being unable to lift; fire department/EMS called to assist.  Lives with daughter and son that are able to provide 24/7 supervision, but cannot provide any physical assist due health issues.  To benefit from continued acute care OT services to increase independence in self-care/functional transfers.  OT to follow.     "

## 2023-12-23 NOTE — SUBJECTIVE & OBJECTIVE
Past Medical History:   Diagnosis Date    Glaucoma     Hypertension        Past Surgical History:   Procedure Laterality Date    CORNEAL TRANSPLANT Right     CORNEAL TRANSPLANT Left 7/17/14    [facility]       Review of patient's allergies indicates:   Allergen Reactions    Pcn [penicillins]        No current facility-administered medications on file prior to encounter.     Current Outpatient Medications on File Prior to Encounter   Medication Sig    ALPHAGAN P 0.15 % ophthalmic solution Place 2 drops into both eyes.     amlodipine (NORVASC) 5 MG tablet Take 5 mg by mouth 2 (two) times daily.    aspirin 81 MG Chew Take 81 mg by mouth once daily.    AZOPT 1 % ophthalmic suspension Place into the left eye 2 (two) times daily.     erythromycin (ROMYCIN) ophthalmic ointment     finasteride (PROSCAR) 5 mg tablet Take 5 mg by mouth once daily.    glucosamine-chondroitin 500-400 mg tablet Take 1 tablet by mouth 2 (two) times daily.    levobunolol (BETAGAN) 0.5 % ophthalmic solution Place 1 drop into the left eye 2 (two) times daily.     lovastatin (MEVACOR) 20 MG tablet     LUMIGAN 0.01 % Drop Place 1 drop into both eyes.     mirtazapine (REMERON) 30 MG tablet     ofloxacin (OCUFLOX) 0.3 % ophthalmic solution Place 1 drop into the left eye 4 (four) times daily.    prednisoLONE acetate (PRED FORTE) 1 % DrpS Place 1 drop into the left eye 4 (four) times daily.    tamsulosin (FLOMAX) 0.4 mg Cp24      Family History    None       Tobacco Use    Smoking status: Never    Smokeless tobacco: Not on file   Substance and Sexual Activity    Alcohol use: Yes     Comment: once a month    Drug use: Not on file    Sexual activity: Not on file     Review of Systems   Constitutional:  Positive for activity change, appetite change and fatigue. Negative for fever.   HENT:  Negative for congestion, ear pain and postnasal drip.    Eyes:  Negative for discharge.   Respiratory:  Negative for apnea, shortness of breath and wheezing.    Cardiovascular:   Negative for chest pain and leg swelling.   Gastrointestinal:  Positive for abdominal pain, constipation and rectal pain. Negative for abdominal distention, nausea and vomiting.   Endocrine: Negative for polydipsia, polyphagia and polyuria.   Genitourinary:  Negative for difficulty urinating, flank pain, frequency, hematuria and urgency.   Musculoskeletal:  Positive for myalgias. Negative for arthralgias and joint swelling.   Skin:  Negative for pallor and rash.   Allergic/Immunologic: Negative for environmental allergies and food allergies.   Neurological:  Negative for dizziness, speech difficulty, weakness, light-headedness and headaches.   Hematological:  Does not bruise/bleed easily.   Psychiatric/Behavioral:  Negative for agitation.      Objective:     Vital Signs (Most Recent):  Temp: 98.3 °F (36.8 °C) (12/22/23 2126)  Pulse: 93 (12/22/23 2201)  Resp: 18 (12/22/23 2201)  BP: (!) 158/71 (12/22/23 2201)  SpO2: 99 % (12/22/23 2201) Vital Signs (24h Range):  Temp:  [98.3 °F (36.8 °C)] 98.3 °F (36.8 °C)  Pulse:  [] 93  Resp:  [15-28] 18  SpO2:  [97 %-100 %] 99 %  BP: (144-187)/(71-98) 158/71        There is no height or weight on file to calculate BMI.     Physical Exam  Constitutional:       Appearance: Normal appearance. He is well-developed.   HENT:      Head: Normocephalic.   Eyes:      Conjunctiva/sclera: Conjunctivae normal.   Cardiovascular:      Rate and Rhythm: Regular rhythm. Tachycardia present.      Pulses:           Radial pulses are 2+ on the right side and 2+ on the left side.      Heart sounds: Normal heart sounds.   Pulmonary:      Effort: Pulmonary effort is normal.      Breath sounds: Examination of the left-lower field reveals decreased breath sounds. Decreased breath sounds present.   Abdominal:      General: Bowel sounds are decreased. There is no distension.      Palpations: Abdomen is soft.      Tenderness: There is abdominal tenderness in the suprapubic area.   Musculoskeletal:          General: Normal range of motion.      Cervical back: Normal range of motion and neck supple.   Skin:     General: Skin is warm and dry.      Capillary Refill: Capillary refill takes 2 to 3 seconds.   Neurological:      Mental Status: He is alert and oriented to person, place, and time.      GCS: GCS eye subscore is 4. GCS verbal subscore is 5. GCS motor subscore is 6.      Motor: Motor function is intact.   Psychiatric:         Mood and Affect: Mood normal.         Speech: Speech normal.         Behavior: Behavior normal.                Significant Labs: All pertinent labs within the past 24 hours have been reviewed.  CBC:   Recent Labs   Lab 12/22/23  1843   WBC 14.69*   HGB 14.1   HCT 41.2        CMP:   Recent Labs   Lab 12/22/23  1843   *   K 4.6   CL 95   CO2 18*      BUN 12   CREATININE 1.3   CALCIUM 9.3   PROT 7.2   ALBUMIN 3.8   BILITOT 0.9   ALKPHOS 86   AST 26   ALT 20   ANIONGAP 14       Significant Imaging: I have reviewed all pertinent imaging results/findings within the past 24 hours.

## 2023-12-23 NOTE — ED NOTES
Received pt laying in bed, pt is connected to monitor. Pt c/o lower back pain, pr adjusted in bed, pt is vitally stable.

## 2023-12-23 NOTE — H&P
"  [facility] Medicine  History & Physical    Patient Name: Dimitri Johnson  MRN: [id number]  Patient Class: OP- Observation  Admission Date: 12/22/2023  Attending Physician: Michelle Gilbert MD   Primary Care Provider: Everette Neville MD         Patient information was obtained from patient, past medical records, and ER records.     Subjective:     Principal Problem:Rectal pain    Chief Complaint:   Chief Complaint   Patient presents with    Multiple Complaints     Family reporting "legs keep giving out on him" x1 month. Also c/o constipation. Just d/c'd from [facility], requesting 2nd opinion.         HPI: No notes on file    Past Medical History:   Diagnosis Date    Glaucoma     Hypertension        Past Surgical History:   Procedure Laterality Date    CORNEAL TRANSPLANT Right     CORNEAL TRANSPLANT Left 7/17/14    [facility]       Review of patient's allergies indicates:   Allergen Reactions    Pcn [penicillins]        No current facility-administered medications on file prior to encounter.     Current Outpatient Medications on File Prior to Encounter   Medication Sig    ALPHAGAN P 0.15 % ophthalmic solution Place 2 drops into both eyes.     amlodipine (NORVASC) 5 MG tablet Take 5 mg by mouth 2 (two) times daily.    aspirin 81 MG Chew Take 81 mg by mouth once daily.    AZOPT 1 % ophthalmic suspension Place into the left eye 2 (two) times daily.     erythromycin (ROMYCIN) ophthalmic ointment     finasteride (PROSCAR) 5 mg tablet Take 5 mg by mouth once daily.    glucosamine-chondroitin 500-400 mg tablet Take 1 tablet by mouth 2 (two) times daily.    levobunolol (BETAGAN) 0.5 % ophthalmic solution Place 1 drop into the left eye 2 (two) times daily.     lovastatin (MEVACOR) 20 MG tablet     LUMIGAN 0.01 % Drop Place 1 drop into both eyes.     mirtazapine (REMERON) 30 MG tablet     ofloxacin (OCUFLOX) 0.3 % ophthalmic solution Place 1 drop into the left eye 4 (four) times daily.    prednisoLONE " acetate (PRED FORTE) 1 % DrpS Place 1 drop into the left eye 4 (four) times daily.    tamsulosin (FLOMAX) 0.4 mg Cp24      Family History    None       Tobacco Use    Smoking status: Never    Smokeless tobacco: Not on file   Substance and Sexual Activity    Alcohol use: Yes     Comment: once a month    Drug use: Not on file    Sexual activity: Not on file     Review of Systems   Constitutional:  Positive for activity change, appetite change and fatigue. Negative for fever.   HENT:  Negative for congestion, ear pain and postnasal drip.    Eyes:  Negative for discharge.   Respiratory:  Negative for apnea, shortness of breath and wheezing.    Cardiovascular:  Negative for chest pain and leg swelling.   Gastrointestinal:  Positive for abdominal pain, constipation and rectal pain. Negative for abdominal distention, nausea and vomiting.   Endocrine: Negative for polydipsia, polyphagia and polyuria.   Genitourinary:  Negative for difficulty urinating, flank pain, frequency, hematuria and urgency.   Musculoskeletal:  Positive for myalgias. Negative for arthralgias and joint swelling.   Skin:  Negative for pallor and rash.   Allergic/Immunologic: Negative for environmental allergies and food allergies.   Neurological:  Negative for dizziness, speech difficulty, weakness, light-headedness and headaches.   Hematological:  Does not bruise/bleed easily.   Psychiatric/Behavioral:  Negative for agitation.      Objective:     Vital Signs (Most Recent):  Temp: 98.3 °F (36.8 °C) (12/22/23 2126)  Pulse: 93 (12/22/23 2201)  Resp: 18 (12/22/23 2201)  BP: (!) 158/71 (12/22/23 2201)  SpO2: 99 % (12/22/23 2201) Vital Signs (24h Range):  Temp:  [98.3 °F (36.8 °C)] 98.3 °F (36.8 °C)  Pulse:  [] 93  Resp:  [15-28] 18  SpO2:  [97 %-100 %] 99 %  BP: (144-187)/(71-98) 158/71        There is no height or weight on file to calculate BMI.     Physical Exam  Constitutional:       Appearance: Normal appearance. He is well-developed.   HENT:       Head: Normocephalic.   Eyes:      Conjunctiva/sclera: Conjunctivae normal.   Cardiovascular:      Rate and Rhythm: Regular rhythm. Tachycardia present.      Pulses:           Radial pulses are 2+ on the right side and 2+ on the left side.      Heart sounds: Normal heart sounds.   Pulmonary:      Effort: Pulmonary effort is normal.      Breath sounds: Examination of the left-lower field reveals decreased breath sounds. Decreased breath sounds present.   Abdominal:      General: Bowel sounds are decreased. There is no distension.      Palpations: Abdomen is soft.      Tenderness: There is abdominal tenderness in the suprapubic area.   Musculoskeletal:         General: Normal range of motion.      Cervical back: Normal range of motion and neck supple.   Skin:     General: Skin is warm and dry.      Capillary Refill: Capillary refill takes 2 to 3 seconds.   Neurological:      Mental Status: He is alert and oriented to person, place, and time.      GCS: GCS eye subscore is 4. GCS verbal subscore is 5. GCS motor subscore is 6.      Motor: Motor function is intact.   Psychiatric:         Mood and Affect: Mood normal.         Speech: Speech normal.         Behavior: Behavior normal.                Significant Labs: All pertinent labs within the past 24 hours have been reviewed.  CBC:   Recent Labs   Lab 12/22/23  1843   WBC 14.69*   HGB 14.1   HCT 41.2        CMP:   Recent Labs   Lab 12/22/23  1843   *   K 4.6   CL 95   CO2 18*      BUN 12   CREATININE 1.3   CALCIUM 9.3   PROT 7.2   ALBUMIN 3.8   BILITOT 0.9   ALKPHOS 86   AST 26   ALT 20   ANIONGAP 14       Significant Imaging: I have reviewed all pertinent imaging results/findings within the past 24 hours.  Assessment/Plan:     * Rectal pain  CT- Limited examination. Moderately large liquid stool within the patulous rectum.  Question possible diarrheal illness and/or fecal impaction.  Left renal cyst.     Prostatomegaly.  Osseous changes particularly  of the spine.    IVF  Morphine for pain  Miralax daily, Dulcolax  Encourge oral intake    Primary hypertension  Chronic, controlled. Latest blood pressure and vitals reviewed-     Temp:  [98.3 °F (36.8 °C)]   Pulse:  []   Resp:  [15-28]   BP: (144-187)/(71-98)   SpO2:  [97 %-100 %] .   Home meds for hypertension were reviewed and noted below.   Hypertension Medications               amlodipine (NORVASC) 5 MG tablet Take 5 mg by mouth 2 (two) times daily.            While in the hospital, will manage blood pressure as follows; Continue home antihypertensive regimen    Will utilize p.r.n. blood pressure medication only if patient's blood pressure greater than 180/110 and he develops symptoms such as worsening chest pain or shortness of breath.      VTE Risk Mitigation (From admission, onward)           Ordered     heparin (porcine) injection 5,000 Units  Every 8 hours         12/22/23 2203     IP VTE HIGH RISK PATIENT  Once         12/22/23 2203     Place sequential compression device  Until discontinued         12/22/23 2203                           Alfredo Nguyen NP  Department of Hospital Medicine  [facility] - Med Surg ([place])

## 2023-12-23 NOTE — NURSING
Nurses Note -- 4 Eyes      12/22/2023   11:38 PM      Skin assessed during: Admit      [x] No Altered Skin Integrity Present    []Prevention Measures Documented      [] Yes- Altered Skin Integrity Present or Discovered   [] LDA Added if Not in Epic (Describe Wound)   [] New Altered Skin Integrity was Present on Admit and Documented in LDA   [] Wound Image Taken    Wound Care Consulted? No    Attending Nurse:  MORENO Danielson    Second RN/Staff Member:  MORENO Drummond

## 2023-12-23 NOTE — PT/OT/SLP EVAL
"Occupational Therapy   Evaluation and Treatment    Name: Dimitri Johnson  MRN: [id number]  Admitting Diagnosis: Constipation  Recent Surgery: * No surgery found *      Recommendations:     Discharge Recommendations: Moderate Intensity Therapy  Discharge Equipment Recommendations:  bedside commode  Barriers to discharge:   (current functional level)    Assessment:   Initial OT eval/treat complete.  MIN cuing for preparatory positioning piror to transitional movements.  Requiring MIN A for sit<>stand from EOB and short household level ambulation.  Requiring MOD A for toilet transfer with assist for lift/lower with use of grab bar.  Pt. Reporting dizziness when going from supine>sit that subsided after good follow through for instruction to sit for a few minutes.  Increased fear of falling noted throughout household ambulation task with Pt. Stating during session, "I just feel this instability," "must lean forward," and "I'm going very slow."  Has chair lift and RW.  Needs BSC.  Recommend post acute Moderate Intensity therapy.  Pt. Has had 2 falls in last 3 months while in bathroom with daughter and son being unable to lift; fire department/EMS called to assist.  Lives with daughter and son that are able to provide 24/7 supervision, but cannot provide any physical assist due health issues.  To benefit from continued acute care OT services to increase independence in self-care/functional transfers.  OT to follow.     Dimitri Johnson is a 89 y.o. male with a medical diagnosis of Constipation.  He presents with below deficits decreasing independence in self-care/functional transfers. Performance deficits affecting function: weakness, impaired endurance, impaired self care skills, impaired functional mobility, gait instability, impaired balance, decreased safety awareness, decreased lower extremity function.      Rehab Prognosis: Good; patient would benefit from acute skilled OT services to address these deficits and reach maximum " level of function.       Plan:     Patient to be seen 4 x/week to address the above listed problems via self-care/home management, therapeutic activities, therapeutic exercises  Plan of Care Expires: 01/06/24  Plan of Care Reviewed with: patient, daughter (both daughters present)    Subjective     Chief Complaint: No c/o pain.   Patient/Family Comments/goals: No goals stated.     Occupational Profile:  Lives with son and daughter in multi-level home with stair lift to reach landing and another to reach door; has claw foot tub and standard toilet.  Previously ambulating with RW and taking sink baths.  Typically has son or daughter home with him (mostly daughter).  Has had 2 falls in last 2 months without injury though Pt. And family unable to lift from floor resulting in EMS/fire department needing to assist.   Equipment Used at Home: walker, rolling, cane, quad (lift chair and stair lift)  Assistance upon Discharge: Son and daughter able to provide supervision though unable to provide physical assist d/t health issues.      Pain/Comfort:  Pain Rating 1: 0/10  Pain Rating Post-Intervention 1: 0/10    Patients cultural, spiritual, Judaism conflicts given the current situation:  (None stated.)    Objective:     Communicated with: Roxana BELL RN prior to session.  Patient found HOB elevated with bed alarm, peripheral IV, gray catheter upon OT entry to room.    General Precautions: Standard, fall  Orthopedic Precautions: N/A  Braces: N/A  Respiratory Status: Room air    Occupational Performance:    Bed Mobility:    Supine<>sit with SBA with HOB elevated, increased time, and MIN verbal cuing for bed rail use.     Functional Mobility/Transfers:  Sit>stand EOB>RW X 2 trials with MIN A for lift assist, MIN verbal cuing for safe hand placement and safety footing.  Once in stance requiring MIN A for more forward trunk.  Requiring approx. 10-second acclamation to positional changes.  Ambulating bed<>bathroom with RW and MIN A  for increased stability.  Step transfer to toilet with use of grab bar and MOD A for lift/lower.      Activities of Daily Living:  Ramirez catheter in place for voiding; 1100 CC emptied in preparation for ambulation task with RN made aware.  Requiring assist for donning/doffing socks while seated EOB with Pt. Reporting having increased difficulty with task.  Educated on different uses of BSC including use over toilet.  Also discussed other options for toileting DME with Pt. And daughters verbalizing understanding.      Cognitive/Visual Perceptual:  Cognitive/Psychosocial Skills:  -       Oriented to: Person, Place, Time, and Situation   -       Follows Commands/attention:Follows one-step commands  -       Communication: clear/fluent  -       Memory: No Deficits noted  -       Safety awareness/insight to disability: impaired   -       Mood/Affect/Coping skills/emotional control: Cooperative and Pleasant    Physical Exam:  Postural examination/scapula alignment: -       Rounded shoulders  -       Forward head  -       Kyphosis  Skin integrity: Visible skin intact  Upper Extremity Range of Motion:  -       Right Upper Extremity: WFL  -       Left Upper Extremity: WFL  Upper Extremity Strength: -       Right Upper Extremity:  4/5 gross  -       Left Upper Extremity:  4/5 gross   Strength: -       Right Upper Extremity: WFL  -       Left Upper Extremity: WFL  Fine Motor Coordination: -       Intact  Left hand thumb/finger opposition skills and Right hand thumb/finger opposition skills    AMPAC 6 Click ADL:  AMPAC Total Score: 17    Treatment & Education:  Educated on role of OT and POC.   Supine<>sit with SBA with HOB elevated, increased time, and MIN verbal cuing for bed rail use.   Sit>stand EOB>RW X 2 trials with MIN A for lift assist, MIN verbal cuing for safe hand placement and safety footing.  Once in stance requiring MIN A for more forward trunk.  Requiring approx. 10-second acclamation to positional changes.   Ambulating bed<>bathroom with RW and MIN A for increased stability.  Step transfer to toilet with use of grab bar and MOD A for lift/lower.    Ramirez catheter in place for voiding; 1100 CC emptied in preparation for ambulation task with RN made aware.  Requiring assist for donning/doffing socks while seated EOB with Pt. Reporting having increased difficulty with task.  Educated on different uses of BSC including use over toilet.  Also discussed other options for toileting DME with Pt. And daughters verbalizing understanding.    Received call light review and importance of calling for assist as needed.     Patient left HOB elevated with all lines intact, call button in reach, nursing notified, and daughters present    GOALS:   Multidisciplinary Problems       Occupational Therapy Goals          Problem: Occupational Therapy    Goal Priority Disciplines Outcome Interventions   Occupational Therapy Goal     OT, PT/OT Ongoing, Progressing    Description: Goals to be met by: 1/6/2024     Patient will increase functional independence with ADLs by performing:    UE Dressing with Stand-by Assistance.  LE Dressing with Stand-by Assistance.  Grooming while standing at sink with Stand-by Assistance.  Toileting from bedside commode with Stand-by Assistance for hygiene and clothing management.   Toilet transfer to bedside commode with Stand-by Assistance.                         History:     Past Medical History:   Diagnosis Date    Glaucoma     Hypertension          Past Surgical History:   Procedure Laterality Date    CORNEAL TRANSPLANT Right     CORNEAL TRANSPLANT Left 7/17/14    DSEK       Time Tracking:     OT Date of Treatment: 12/23/23  OT Start Time: 1456  OT Stop Time: 1612  OT Total Time (min): 76 min    Billable Minutes:Evaluation 15  Self Care/Home Management 15  Therapeutic Activity 46    12/23/2023

## 2023-12-23 NOTE — ED NOTES
Denies reaction from last allergy injection. Denies feeling sick. Yellow top [id number] allergy vial verified by patient. Injection x2 given, tolerated well. Advised to wait 30 min after injection to monitor for adverse reactions.     Pt states he is not comfortable, placed two pillows under pt's buttocks, adjusted pt in bed, pillow placed under pt's head.

## 2023-12-23 NOTE — ED TRIAGE NOTES
"Pt arrived to ED via EMS, picked up from home after family called complaining the patient can no longer walk and is constipated. Upon arrival, pt c/o "needing to pee." Per EMS, the patient was recently d/c'd from [facility] 2 days ago and is requesting a second opinion.   "

## 2023-12-23 NOTE — ASSESSMENT & PLAN NOTE
CT- Limited examination. Moderately large liquid stool within the patulous rectum.  Question possible diarrheal illness and/or fecal impaction.  Left renal cyst.     Prostatomegaly.  Osseous changes particularly of the spine.    IVF  Morphine for pain  Miralax daily, Dulcolax  Encourge oral intake

## 2023-12-23 NOTE — ED PROVIDER NOTES
"Source of History:  Patient/Son    Chief complaint:  Multiple Complaints (Family reporting "legs keep giving out on him" x1 month. Also c/o constipation. Just d/c'd from [facility], requesting 2nd opinion. )      HPI:  Dimitri Johnson is a 89 y.o. male presenting to the emergency department reporting rectal pain, weakness, urinary retention since earlier today.  Pain was seen at [facility] 2 days ago and had multiple enemas for a fecal impaction.  Had relief while in the ED.  Symptoms returned today.  No fever.      This is the extent to the patients complaints today here in the emergency department.    PMH:  As per HPI and below:  Past Medical History:   Diagnosis Date    Glaucoma     Hypertension      Past Surgical History:   Procedure Laterality Date    CORNEAL TRANSPLANT Right     CORNEAL TRANSPLANT Left 7/17/14    DSEK       Social History     Tobacco Use    Smoking status: Never   Substance Use Topics    Alcohol use: Yes     Comment: once a month       Review of patient's allergies indicates:   Allergen Reactions    Pcn [penicillins]        ROS: As per HPI and below:  General: No fever.  No chills. fatigue  ENT: No sore throat. No ear pain  Chest: No shortness of breath. No cough.    Cardiovascular: No chest pain.   Abdomen: No n/v/d or abd pain  Genito-Urinary: No abnormal urination.   rectal pain  Neurologic: No focal weakness.  No numbness.  No headache  MSK: lower back pain  Integument: No rashes or lesions.    Physical Exam:    BP (!) 158/71   Pulse 93   Temp 98.3 °F (36.8 °C) (Oral)   Resp 18   SpO2 99%   Vitals:    12/22/23 2033 12/22/23 2102 12/22/23 2126 12/22/23 2131   BP: (!) 187/85 (!) 169/88     Pulse: 91 102     Resp: 18 17  18   Temp:   98.3 °F (36.8 °C)    TempSrc:   Oral    SpO2: 97% 99%      12/22/23 2201   BP: (!) 158/71   Pulse: 93   Resp: 18   Temp:    TempSrc:    SpO2: 99%       Nursing note and vital signs reviewed.  Appearance: No acute distress. Well-appearing. Non-toxic.    Eyes: No " conjunctival injection.  Extraocular muscles are intact. Opaque right eye.  ENT: MM are pink and moist.    Chest/ Respiratory: Clear to auscultation bilaterally.  Good air movement.  No wheezes.  No rhonchi. No rales. No accessory muscle use.  Cardiovascular: Regular rate and rhythm.  No murmurs. No gallops. No rubs.  Abdomen: Soft.  Distended.    : significant pain with rectal exam, good rectal tone.    Back: no CVA tenderness.    Musculoskeletal: Good range of motion all joints.  No deformities.  Neck supple.  No meningismus. +1 bilaterally lower ext swelling  Skin: No rashes seen.  Good turgor.  No abrasions.  No ecchymoses.  Neuro: alert and oriented x3,  no focal neurological deficits.  Psych: Appropriate, conversant    Initial MDM:  9-year-old male recently seen at [facility] for a rectal impaction 2 days ago, brought in by EMS for weakness, rectal pain and urinary retention.  On exam he had some lower abdominal distention.  Bladder screen greater than 500 cc.  Urinary retention likely related to constipation.  Plan for blood work, x-ray and Ramirez catheter.    Labs Reviewed   CBC W/ AUTO DIFFERENTIAL - Abnormal; Notable for the following components:       Result Value    WBC 14.69 (*)     RBC 4.59 (*)     Gran # (ANC) 12.3 (*)     Immature Grans (Abs) 0.05 (*)     Gran % 83.5 (*)     Lymph % 10.8 (*)     All other components within normal limits   COMPREHENSIVE METABOLIC PANEL - Abnormal; Notable for the following components:    Sodium 127 (*)     CO2 18 (*)     eGFR 52 (*)     All other components within normal limits   URINALYSIS, REFLEX TO URINE CULTURE - Abnormal; Notable for the following components:    Color, UA Colorless (*)     Specific Gravity, UA <1.005 (*)     Occult Blood UA 2+ (*)     All other components within normal limits    Narrative:     Specimen Source->Urine   LIPASE   URINALYSIS MICROSCOPIC    Narrative:     Specimen Source->Urine   SARS-COV-2 RDRP GENE   POCT INFLUENZA A/B MOLECULAR   POCT  GLUCOSE       CT Abdomen Pelvis With IV Contrast NO Oral Contrast   Final Result      Limited examination. Moderately large liquid stool within the patulous rectum.  Question possible diarrheal illness and/or fecal impaction.      Left renal cyst.      Prostatomegaly.      Osseous changes particularly of the spine.         Electronically signed by: Jackie Ibarra   Date:    12/22/2023   Time:    21:30      X-Ray Abdomen AP 1 View (KUB)   Final Result      Nonspecific bowel gas pattern.         Electronically signed by: Jackie Ibarra   Date:    12/22/2023   Time:    19:34            Initial Impression/ Differential Dx:  Differential diagnosis includes but not limited to: GERD, intestinal spasm, gastroenteritis, gastritis, ulcer, cholecystitis, cholelithiasis, gallstones, pancreatitis, ileus, small bowel obstruction, appendicitis, diverticulitis, colitis, constipation, intestinal gas pain      MDM:    89 y.o. male with urinary retention, weakness and rectal pain, diagnosed and treated for fecal impaction 2 days ago at an outside emergency department.  On arrival patient had some lower abdominal distention.  Ramirez was placed and drained 700 cc initially.  X-ray revealed nonspecific bowel gas pattern.  Rectal exam had significant pain, concern for proctitis.  No large stool burden was noted.  CT abdomen pelvis with contrast revealed moderate amount of liquid stool with a questionable fecal impaction.  Labs reveal mild leukocytosis, stable H&H.  He has a mild hyponatremia of 127.  No BRADLEY.  Patient will be admitted to the hospitalist for bowel regimen, pain control and monitoring of sodium.    ED Course as of 12/22/23 2206   Fri Dec 22, 2023   1849 WBC(!): 14.69 [AS]   1849 Hemoglobin: 14.1 [AS]   1849 Hematocrit: 41.2 [AS]   1911 Sodium(!): 127 [AS]   1911 BUN: 12 [AS]   1911 Creatinine: 1.3 [AS]   1921 Lipase: 59 [AS]      ED Course User Index  [AS] Camilla King, KARIE       Diagnostic Impression:    1. Rectal pain     2. Constipation    3. Weakness    4. Hyponatremia         ED Disposition Condition    Observation Stable                    Camilla King, [facility]  12/22/23 0566

## 2023-12-23 NOTE — PLAN OF CARE
12/23/23 0815   WARD Message   Medicare Outpatient and Observation Notification regarding financial responsibility Given to patient/caregiver;Explained to patient/caregiver;Signed/date by patient/caregiver   Date WARD was signed 12/23/23   Time WARD was signed 0713

## 2023-12-23 NOTE — ASSESSMENT & PLAN NOTE
CT- Limited examination. Moderately large liquid stool within the patulous rectum.  Question possible diarrheal illness and/or fecal impaction.  Left renal cyst.     Prostatomegaly.  Osseous changes particularly of the spine.    IVF discontinued  Miralax daily, Dulcolax daily  Encourge oral intake  Use BSC

## 2023-12-24 LAB
ALBUMIN SERPL BCP-MCNC: 3.2 G/DL (ref 3.5–5.2)
ALP SERPL-CCNC: 73 U/L (ref 55–135)
ALT SERPL W/O P-5'-P-CCNC: 16 U/L (ref 10–44)
ANION GAP SERPL CALC-SCNC: 10 MMOL/L (ref 8–16)
AST SERPL-CCNC: 24 U/L (ref 10–40)
BASOPHILS # BLD AUTO: 0.04 K/UL (ref 0–0.2)
BASOPHILS NFR BLD: 0.5 % (ref 0–1.9)
BILIRUB SERPL-MCNC: 0.7 MG/DL (ref 0.1–1)
BUN SERPL-MCNC: 15 MG/DL (ref 8–23)
CALCIUM SERPL-MCNC: 8.8 MG/DL (ref 8.7–10.5)
CHLORIDE SERPL-SCNC: 106 MMOL/L (ref 95–110)
CO2 SERPL-SCNC: 21 MMOL/L (ref 23–29)
CREAT SERPL-MCNC: 1.2 MG/DL (ref 0.5–1.4)
DIFFERENTIAL METHOD: ABNORMAL
EOSINOPHIL # BLD AUTO: 0.1 K/UL (ref 0–0.5)
EOSINOPHIL NFR BLD: 0.8 % (ref 0–8)
ERYTHROCYTE [DISTWIDTH] IN BLOOD BY AUTOMATED COUNT: 12.8 % (ref 11.5–14.5)
EST. GFR  (NO RACE VARIABLE): 57 ML/MIN/1.73 M^2
GLUCOSE SERPL-MCNC: 93 MG/DL (ref 70–110)
HCT VFR BLD AUTO: 41 % (ref 40–54)
HGB BLD-MCNC: 13.9 G/DL (ref 14–18)
IMM GRANULOCYTES # BLD AUTO: 0.04 K/UL (ref 0–0.04)
IMM GRANULOCYTES NFR BLD AUTO: 0.5 % (ref 0–0.5)
LYMPHOCYTES # BLD AUTO: 1.8 K/UL (ref 1–4.8)
LYMPHOCYTES NFR BLD: 20.1 % (ref 18–48)
MAGNESIUM SERPL-MCNC: 2.1 MG/DL (ref 1.6–2.6)
MCH RBC QN AUTO: 31 PG (ref 27–31)
MCHC RBC AUTO-ENTMCNC: 33.9 G/DL (ref 32–36)
MCV RBC AUTO: 91 FL (ref 82–98)
MONOCYTES # BLD AUTO: 0.7 K/UL (ref 0.3–1)
MONOCYTES NFR BLD: 8.5 % (ref 4–15)
NEUTROPHILS # BLD AUTO: 6.1 K/UL (ref 1.8–7.7)
NEUTROPHILS NFR BLD: 69.6 % (ref 38–73)
NRBC BLD-RTO: 0 /100 WBC
PHOSPHATE SERPL-MCNC: 4.5 MG/DL (ref 2.7–4.5)
PLATELET # BLD AUTO: 316 K/UL (ref 150–450)
PMV BLD AUTO: 10 FL (ref 9.2–12.9)
POTASSIUM SERPL-SCNC: 3.9 MMOL/L (ref 3.5–5.1)
PROT SERPL-MCNC: 5.9 G/DL (ref 6–8.4)
RBC # BLD AUTO: 4.49 M/UL (ref 4.6–6.2)
SODIUM SERPL-SCNC: 137 MMOL/L (ref 136–145)
WBC # BLD AUTO: 8.71 K/UL (ref 3.9–12.7)

## 2023-12-24 PROCEDURE — G0378 HOSPITAL OBSERVATION PER HR: HCPCS

## 2023-12-24 PROCEDURE — 25000003 PHARM REV CODE 250: Performed by: NURSE PRACTITIONER

## 2023-12-24 PROCEDURE — 36415 COLL VENOUS BLD VENIPUNCTURE: CPT | Performed by: NURSE PRACTITIONER

## 2023-12-24 PROCEDURE — 97530 THERAPEUTIC ACTIVITIES: CPT

## 2023-12-24 PROCEDURE — 83735 ASSAY OF MAGNESIUM: CPT | Performed by: NURSE PRACTITIONER

## 2023-12-24 PROCEDURE — 96372 THER/PROPH/DIAG INJ SC/IM: CPT | Mod: 59 | Performed by: NURSE PRACTITIONER

## 2023-12-24 PROCEDURE — 30200315 PPD INTRADERMAL TEST REV CODE 302: Performed by: NURSE PRACTITIONER

## 2023-12-24 PROCEDURE — 63600175 PHARM REV CODE 636 W HCPCS: Performed by: NURSE PRACTITIONER

## 2023-12-24 PROCEDURE — 86580 TB INTRADERMAL TEST: CPT | Performed by: NURSE PRACTITIONER

## 2023-12-24 PROCEDURE — 80053 COMPREHEN METABOLIC PANEL: CPT | Performed by: NURSE PRACTITIONER

## 2023-12-24 PROCEDURE — 84100 ASSAY OF PHOSPHORUS: CPT | Performed by: NURSE PRACTITIONER

## 2023-12-24 PROCEDURE — 85025 COMPLETE CBC W/AUTO DIFF WBC: CPT | Performed by: NURSE PRACTITIONER

## 2023-12-24 RX ADMIN — TUBERCULIN PURIFIED PROTEIN DERIVATIVE 5 UNITS: 5 INJECTION, SOLUTION INTRADERMAL at 01:12

## 2023-12-24 RX ADMIN — SIMETHICONE 80 MG: 80 TABLET, CHEWABLE ORAL at 09:12

## 2023-12-24 RX ADMIN — HEPARIN SODIUM 5000 UNITS: 5000 INJECTION INTRAVENOUS; SUBCUTANEOUS at 01:12

## 2023-12-24 RX ADMIN — POLYETHYLENE GLYCOL 3350 17 G: 17 POWDER, FOR SOLUTION ORAL at 09:12

## 2023-12-24 RX ADMIN — PRAVASTATIN SODIUM 20 MG: 20 TABLET ORAL at 09:12

## 2023-12-24 RX ADMIN — HEPARIN SODIUM 5000 UNITS: 5000 INJECTION INTRAVENOUS; SUBCUTANEOUS at 09:12

## 2023-12-24 RX ADMIN — AMLODIPINE BESYLATE 5 MG: 5 TABLET ORAL at 09:12

## 2023-12-24 RX ADMIN — BISACODYL 10 MG: 10 SUPPOSITORY RECTAL at 09:12

## 2023-12-24 RX ADMIN — FINASTERIDE 5 MG: 5 TABLET, FILM COATED ORAL at 09:12

## 2023-12-24 RX ADMIN — SIMETHICONE 80 MG: 80 TABLET, CHEWABLE ORAL at 01:12

## 2023-12-24 RX ADMIN — HEPARIN SODIUM 5000 UNITS: 5000 INJECTION INTRAVENOUS; SUBCUTANEOUS at 05:12

## 2023-12-24 RX ADMIN — ACETAMINOPHEN 650 MG: 325 TABLET, FILM COATED ORAL at 07:12

## 2023-12-24 RX ADMIN — TAMSULOSIN HYDROCHLORIDE 0.4 MG: 0.4 CAPSULE ORAL at 09:12

## 2023-12-24 RX ADMIN — ASPIRIN 81 MG CHEWABLE TABLET 81 MG: 81 TABLET CHEWABLE at 09:12

## 2023-12-24 NOTE — NURSING
Patient given one phosphate sodium enema rectally.  Was going to help up to BS , but patient only able to hold for over 6 minutes. He  passed (softball) sized soft creamy textured stool.  Patient cleansed.  Instructed to call when/if the next urge occurs.  Patient's primary nurse informed.

## 2023-12-24 NOTE — ASSESSMENT & PLAN NOTE
Chronic, controlled. Latest blood pressure and vitals reviewed-     Temp:  [97.7 °F (36.5 °C)-98.4 °F (36.9 °C)]   Pulse:  [75-99]   Resp:  [16-18]   BP: (119-145)/(57-95)   SpO2:  [97 %-99 %] .   Home meds for hypertension were reviewed and noted below.   Hypertension Medications               amlodipine (NORVASC) 5 MG tablet Take 5 mg by mouth 2 (two) times daily.          Patient does not take BP medications at home but he is prescribed them.     Will utilize p.r.n. blood pressure medication only if patient's blood pressure greater than 180/110 and he develops symptoms such as worsening chest pain or shortness of breath.

## 2023-12-24 NOTE — ASSESSMENT & PLAN NOTE
Patient uses walker at baseline. His son reports that yesterday he was unable to get off of toilet and ambulate. PT/OT consulted. SNF placement being pursued.

## 2023-12-24 NOTE — PT/OT/SLP PROGRESS
Occupational Therapy      Patient Name:  Dimitri Johnson   MRN:  [id number]    1643:  Pt. Assisted with transfer from bedside chair to bed after remaining up for approx. 4.5 hours this day.  All needs addressed at this time and RN made aware.    12/24/2023

## 2023-12-24 NOTE — SUBJECTIVE & OBJECTIVE
Interval History: Mr Mosley denies abdominal astorga or contractions today. He had a BM yesterday following an enema and BSC placement.He is concerned about going home bc he has limited/decreased mobility. He would like SNF placement for PT/OT. Called son twice to update on plan. Left one voicemail.    Review of Systems   Constitutional:  Positive for activity change. Negative for fatigue.   HENT:  Negative for congestion.    Eyes:  Positive for visual disturbance (right eye blind).   Respiratory:  Negative for shortness of breath.    Cardiovascular:  Negative for chest pain.   Gastrointestinal:  Negative for abdominal distention and constipation.   Genitourinary:  Positive for difficulty urinating.   Musculoskeletal:  Negative for arthralgias and back pain.   Neurological:         General weakness     Objective:     Vital Signs (Most Recent):  Temp: 97.7 °F (36.5 °C) (12/24/23 1114)  Pulse: 83 (12/24/23 1114)  Resp: 18 (12/24/23 1114)  BP: (!) 119/57 (12/24/23 1114)  SpO2: 97 % (12/24/23 1114) Vital Signs (24h Range):  Temp:  [97.7 °F (36.5 °C)-98.4 °F (36.9 °C)] 97.7 °F (36.5 °C)  Pulse:  [75-99] 83  Resp:  [16-18] 18  SpO2:  [97 %-99 %] 97 %  BP: (119-145)/(57-95) 119/57     Weight: 61.6 kg (135 lb 12.9 oz)  Body mass index is 18.94 kg/m².    Intake/Output Summary (Last 24 hours) at 12/24/2023 1429  Last data filed at 12/24/2023 0800  Gross per 24 hour   Intake 180 ml   Output 2300 ml   Net -2120 ml         Physical Exam  Vitals reviewed.   Cardiovascular:      Rate and Rhythm: Normal rate.      Pulses: Normal pulses.   Pulmonary:      Effort: Pulmonary effort is normal. No respiratory distress.   Abdominal:      General: There is no distension.      Tenderness: There is no abdominal tenderness. There is no guarding.   Musculoskeletal:         General: No swelling.      Right lower leg: No edema.      Left lower leg: No edema.   Skin:     General: Skin is dry.   Neurological:      Mental Status: He is alert and  oriented to person, place, and time.             Significant Labs: All pertinent labs within the past 24 hours have been reviewed.  BMP:   Recent Labs   Lab 12/24/23  0526   GLU 93      K 3.9      CO2 21*   BUN 15   CREATININE 1.2   CALCIUM 8.8   MG 2.1     CBC:   Recent Labs   Lab 12/22/23  1843 12/23/23  0509 12/24/23  0526   WBC 14.69* 10.41 8.71   HGB 14.1 14.7 13.9*   HCT 41.2 43.0 41.0    368 316     CMP:   Recent Labs   Lab 12/22/23  1843 12/23/23  0509 12/24/23  0526   * 136 137   K 4.6 4.0 3.9   CL 95 103 106   CO2 18* 23 21*    92 93   BUN 12 11 15   CREATININE 1.3 1.2 1.2   CALCIUM 9.3 9.3 8.8   PROT 7.2 6.5 5.9*   ALBUMIN 3.8 3.6 3.2*   BILITOT 0.9 0.9 0.7   ALKPHOS 86 79 73   AST 26 29 24   ALT 20 19 16   ANIONGAP 14 10 10       Significant Imaging: I have reviewed all pertinent imaging results/findings within the past 24 hours.  CT Abdomen Pelvis With IV Contrast NO Oral Contrast  Narrative: EXAMINATION:  CT OF ABDOMEN PELVIS WITH    CLINICAL HISTORY:  Abdominal pain, acute, nonlocalized;    TECHNIQUE:  5 mm enhanced axial images were obtained from the lung bases through the greater trochanters.   mL of Omnipaque 350 was injected.    COMPARISON:  None.    FINDINGS:  Examination is limited by motion artifact.  The liver, spleen, pancreas, right kidney and adrenal glands are unremarkable. The gallbladder contains no calcified gallstones.    There is a left renal cyst.    There is no definite evidence for abdominal adenopathy or ascites.  Moderate vascular congestion is present.    There are no pelvic masses or adenopathy.  The prostate gland measures up to 5.5 cm in maximal diameter.  Consider correlation with PSA.  There is a Ramirez catheter with its tip in the urinary bladder.  Moderately large liquid stool is seen within the patulous rectum.    There is no free fluid in the pelvis.    There is mild bibasilar atelectasis.    Diffuse idiopathic skeletal hyperostosis is  seen involving the thoracic spine.  There are some degenerative changes particularly at L4/L5 including intervertebral disc space narrowing, vacuum phenomena, and marginal osteophytes.  There are osseous changes, which are mixed lytic and sclerotic changes of the spine.  Impression: Limited examination. Moderately large liquid stool within the patulous rectum.  Question possible diarrheal illness and/or fecal impaction.    Left renal cyst.    Prostatomegaly.    Osseous changes particularly of the spine.    Electronically signed by: Jackie Ibarra  Date:    12/22/2023  Time:    21:30  X-Ray Abdomen AP 1 View (KUB)  Narrative: EXAMINATION:  ABDOMEN AP    CLINICAL HISTORY:  Constipation, unspecified    TECHNIQUE:  Abdomen AP one view    COMPARISON:  None.    FINDINGS:  Single AP view of the abdomen demonstrates a nonspecific bowel gas pattern. No dilated loops small bowel or air-fluid levels are detected.  There are gaseous distension of the bowel.  No excessive fecal material is detected  Impression: Nonspecific bowel gas pattern.    Electronically signed by: Jackie Ibarra  Date:    12/22/2023  Time:    19:34

## 2023-12-24 NOTE — PLAN OF CARE
VSS on RA and afebrile this shift. Patient is disoriented x2.   Needs x1 person assist in changing position. Foleys catheter continued.  Free from injury or skin breakdown; Fall precautions maintained and call light in reach.  POC updated questions answered and comments acknowledged.  Purposeful hourly rounding completed this shift.    Problem: Infection  Goal: Absence of Infection Signs and Symptoms  Outcome: Ongoing, Progressing     Problem: Adult Inpatient Plan of Care  Goal: Plan of Care Review  Outcome: Ongoing, Progressing  Goal: Patient-Specific Goal (Individualized)  Outcome: Ongoing, Progressing  Goal: Absence of Hospital-Acquired Illness or Injury  Outcome: Ongoing, Progressing  Goal: Optimal Comfort and Wellbeing  Outcome: Ongoing, Progressing  Goal: Readiness for Transition of Care  Outcome: Ongoing, Progressing     Problem: Skin Injury Risk Increased  Goal: Skin Health and Integrity  Outcome: Ongoing, Progressing     Problem: Fall Injury Risk  Goal: Absence of Fall and Fall-Related Injury  Outcome: Ongoing, Progressing

## 2023-12-24 NOTE — ASSESSMENT & PLAN NOTE
Over 500 ml of urine in bladder on scan. Ramirez placed  in ED.  -Will need voiding trial tomorrow

## 2023-12-24 NOTE — NURSING
1910: Pt moved to room 358 (from 311). Suppository administered. Will report to night shift RN. MD wants pt to get PRN enema if no result from suppository--and nursing orders to put pt on BSC q4 hour for at least 5 min. Pt lying in bed. No acute distress. Instructed to call prn needs or bathroom need. Call light in reach.

## 2023-12-24 NOTE — PLAN OF CARE
I certify I provided patient choice and a list to the patient/family of CMS rated SNFs. Patient signed Patient's Choice Disclosure Form choosing the following  First available but would prefer [place] area if possible ([facility] or [facility])  Referrals forwarded via [employer]Port   PPD & CXR ordered   Will need to complete LOCET & PASSR once state reopens Wed 12/27 12/24/23 1332   Post-Acute Status   Post-Acute Authorization Placement   Post-Acute Placement Status Referrals Sent   Patient choice form signed by patient/caregiver List from System Post-Acute Care   Discharge Delays None known at this time   Discharge Plan   Discharge Plan A Skilled Nursing Facility

## 2023-12-24 NOTE — PT/OT/SLP PROGRESS
"Occupational Therapy   Treatment    Name: Dimitri Johnson  MRN: [id number]  Admitting Diagnosis:  Constipation       Recommendations:     Discharge Recommendations: Moderate Intensity Therapy  Discharge Equipment Recommendations:  bedside commode (currently needed though will defer to next level of care)  Barriers to discharge:   (current functional level)    Assessment:   Pt. Continues to express feeling unsteady when up.  Needs encouragement and step by step instruction for preparatory positioning and transitions of hands from chair to RW to decrease anxiety during task during standing and controlled descent.  Requiring assist with lift/lower during transitional movements though at times requiring increased assist with lowering thus requiring MOD A for 50% of transitional movements and MIN A for 50%.  Progressing towards goals.  Continued recommendation of Moderate Intensity therapy.  To benefit from continued acute care OT services to increase independence in self-care/functional transfers.  Continue POC.     Dimitri Johnson is a 89 y.o. male with a medical diagnosis of Constipation.  He presents with below deficits decreasing independence in self-care/functional transfers. Performance deficits affecting function are weakness, impaired endurance, impaired self care skills, impaired functional mobility, gait instability, impaired balance, decreased lower extremity function, decreased safety awareness, decreased ROM.     Rehab Prognosis:  Good; patient would benefit from acute skilled OT services to address these deficits and reach maximum level of function.       Plan:     Patient to be seen 4 x/week to address the above listed problems via self-care/home management, therapeutic activities, therapeutic exercises  Plan of Care Expires: 01/06/23  Plan of Care Reviewed with: patient    Subjective     Chief Complaint: No c/o pain though reports is buttocks feels "rashy."  Patient/Family Comments/goals: No goals stated.  " Expressed fear with transitional movements.    Pain/Comfort:  Pain Rating 1: 0/10  Pain Rating Post-Intervention 1: 0/10    Objective:     Communicated with: Libby GOMEZ RN prior to session.  Patient found HOB elevated with peripheral IV, gray catheter upon OT entry to room.    General Precautions: Standard, fall (R-eye blindness)    Orthopedic Precautions:N/A  Braces: N/A  Respiratory Status: Room air     Occupational Performance:     Bed Mobility:    Supine>sit with SBA and HOB elevated; bed rail used.     Functional Mobility/Transfers:  Sit>stand EOB>RW with MIN A for lift and MIN A for initial stability in stance with BUE supported and assist for more forward trunk.  Ambulating short household distance bed>BSC>bedside chair approx. 20 feet with RW, MIN A, forward flexed trunk, and seated rest break once reaching BSC and again once reaching bedside chair; stayed on BSC X 5-minutes though reporting no urge to have BM.  Step transfers to BSC with MIN A and to bedside chair with MOD A requiring increased time and step by step cuing for preparatory positioning during cuing.    Sit<>stand bedside chair<>RW X 4 requiring MOD A for 50% of task and MIN A for 50% of task with assist needed for lift/lower; requiring sequencing cues for preparatory movement and cuing throughout for hand transitions.  Also requiring encouragement and step by step cuing to decrease anxiety with transitional movements.      [facility] 6 Click ADL: 17    Treatment & Education:  Educated on role of OT.  Supine>sit with SBA and HOB elevated; bed rail used.   Sit>stand EOB>RW with MIN A for lift and MIN A for initial stability in stance with BUE supported and assist for more forward trunk.  Ambulating short household distance bed>BSC>bedside chair approx. 20 feet with RW, MIN A, forward flexed trunk, and seated rest break once reaching BSC and again once reaching bedside chair; stayed on BSC X 5-minutes though reporting no urge to have BM.  Step  transfers to BS with MIN A and to bedside chair with MOD A requiring increased time and step by step cuing for preparatory positioning during cuing.    Sit<>stand bedside chair<>RW X 4 requiring MOD A for 50% of task and MIN A for 50% of task with assist needed for lift/lower; requiring sequencing cues for preparatory movement and cuing throughout for hand transitions.  Also requiring encouragement and step by step cuing to decrease anxiety with transitional movements.   Received call light review and importance of calling for assist as needed.        Patient left up in chair with all lines intact, call button in reach, and nursing notified    GOALS:   Multidisciplinary Problems       Occupational Therapy Goals          Problem: Occupational Therapy    Goal Priority Disciplines Outcome Interventions   Occupational Therapy Goal     OT, PT/OT Ongoing, Progressing    Description: Goals to be met by: 1/6/2024     Patient will increase functional independence with ADLs by performing:    UE Dressing with Stand-by Assistance.  LE Dressing with Stand-by Assistance.  Grooming while standing at sink with Stand-by Assistance.  Toileting from bedside commode with Stand-by Assistance for hygiene and clothing management.   Toilet transfer to bedside commode with Stand-by Assistance.                         Time Tracking:     OT Date of Treatment: 12/24/23  OT Start Time: 1220  OT Stop Time: 1304  OT Total Time (min): 44 min    Billable Minutes:Therapeutic Activity 44    OT/SALVADOR: OT          12/24/2023

## 2023-12-24 NOTE — PROGRESS NOTES
[facility] Surg [address] Medicine  Progress Note    Patient Name: Dimitri Johnson  MRN: [id number]  Patient Class: OP- Observation   Admission Date: 12/22/2023  Length of Stay: 0 days  Attending Physician: Michelle Gilbert MD  Primary Care Provider: Everette Neville MD        Subjective:     Principal Problem:Constipation        HPI:  No notes on file    Overview/Hospital Course:  No notes on file    Interval History: Mr Mosley denies abdominal astorga or contractions today. He had a BM yesterday following an enema and BSC placement.He is concerned about going home bc he has limited/decreased mobility. He would like SNF placement for PT/OT. Called son twice to update on plan. Left one voicemail.    Review of Systems   Constitutional:  Positive for activity change. Negative for fatigue.   HENT:  Negative for congestion.    Eyes:  Positive for visual disturbance (right eye blind).   Respiratory:  Negative for shortness of breath.    Cardiovascular:  Negative for chest pain.   Gastrointestinal:  Negative for abdominal distention and constipation.   Genitourinary:  Positive for difficulty urinating.   Musculoskeletal:  Negative for arthralgias and back pain.   Neurological:         General weakness     Objective:     Vital Signs (Most Recent):  Temp: 97.7 °F (36.5 °C) (12/24/23 1114)  Pulse: 83 (12/24/23 1114)  Resp: 18 (12/24/23 1114)  BP: (!) 119/57 (12/24/23 1114)  SpO2: 97 % (12/24/23 1114) Vital Signs (24h Range):  Temp:  [97.7 °F (36.5 °C)-98.4 °F (36.9 °C)] 97.7 °F (36.5 °C)  Pulse:  [75-99] 83  Resp:  [16-18] 18  SpO2:  [97 %-99 %] 97 %  BP: (119-145)/(57-95) 119/57     Weight: 61.6 kg (135 lb 12.9 oz)  Body mass index is 18.94 kg/m².    Intake/Output Summary (Last 24 hours) at 12/24/2023 1429  Last data filed at 12/24/2023 0800  Gross per 24 hour   Intake 180 ml   Output 2300 ml   Net -2120 ml         Physical Exam  Vitals reviewed.   Cardiovascular:      Rate and Rhythm: Normal rate.      Pulses:  Normal pulses.   Pulmonary:      Effort: Pulmonary effort is normal. No respiratory distress.   Abdominal:      General: There is no distension.      Tenderness: There is no abdominal tenderness. There is no guarding.   Musculoskeletal:         General: No swelling.      Right lower leg: No edema.      Left lower leg: No edema.   Skin:     General: Skin is dry.   Neurological:      Mental Status: He is alert and oriented to person, place, and time.             Significant Labs: All pertinent labs within the past 24 hours have been reviewed.  BMP:   Recent Labs   Lab 12/24/23  0526   GLU 93      K 3.9      CO2 21*   BUN 15   CREATININE 1.2   CALCIUM 8.8   MG 2.1     CBC:   Recent Labs   Lab 12/22/23  1843 12/23/23  0509 12/24/23  0526   WBC 14.69* 10.41 8.71   HGB 14.1 14.7 13.9*   HCT 41.2 43.0 41.0    368 316     CMP:   Recent Labs   Lab 12/22/23  1843 12/23/23  0509 12/24/23  0526   * 136 137   K 4.6 4.0 3.9   CL 95 103 106   CO2 18* 23 21*    92 93   BUN 12 11 15   CREATININE 1.3 1.2 1.2   CALCIUM 9.3 9.3 8.8   PROT 7.2 6.5 5.9*   ALBUMIN 3.8 3.6 3.2*   BILITOT 0.9 0.9 0.7   ALKPHOS 86 79 73   AST 26 29 24   ALT 20 19 16   ANIONGAP 14 10 10       Significant Imaging: I have reviewed all pertinent imaging results/findings within the past 24 hours.  CT Abdomen Pelvis With IV Contrast NO Oral Contrast  Narrative: EXAMINATION:  CT OF ABDOMEN PELVIS WITH    CLINICAL HISTORY:  Abdominal pain, acute, nonlocalized;    TECHNIQUE:  5 mm enhanced axial images were obtained from the lung bases through the greater trochanters.   mL of Omnipaque 350 was injected.    COMPARISON:  None.    FINDINGS:  Examination is limited by motion artifact.  The liver, spleen, pancreas, right kidney and adrenal glands are unremarkable. The gallbladder contains no calcified gallstones.    There is a left renal cyst.    There is no definite evidence for abdominal adenopathy or ascites.  Moderate vascular  congestion is present.    There are no pelvic masses or adenopathy.  The prostate gland measures up to 5.5 cm in maximal diameter.  Consider correlation with PSA.  There is a Ramirez catheter with its tip in the urinary bladder.  Moderately large liquid stool is seen within the patulous rectum.    There is no free fluid in the pelvis.    There is mild bibasilar atelectasis.    Diffuse idiopathic skeletal hyperostosis is seen involving the thoracic spine.  There are some degenerative changes particularly at L4/L5 including intervertebral disc space narrowing, vacuum phenomena, and marginal osteophytes.  There are osseous changes, which are mixed lytic and sclerotic changes of the spine.  Impression: Limited examination. Moderately large liquid stool within the patulous rectum.  Question possible diarrheal illness and/or fecal impaction.    Left renal cyst.    Prostatomegaly.    Osseous changes particularly of the spine.    Electronically signed by: Jackie Ibarra  Date:    12/22/2023  Time:    21:30  X-Ray Abdomen AP 1 View (KUB)  Narrative: EXAMINATION:  ABDOMEN AP    CLINICAL HISTORY:  Constipation, unspecified    TECHNIQUE:  Abdomen AP one view    COMPARISON:  None.    FINDINGS:  Single AP view of the abdomen demonstrates a nonspecific bowel gas pattern. No dilated loops small bowel or air-fluid levels are detected.  There are gaseous distension of the bowel.  No excessive fecal material is detected  Impression: Nonspecific bowel gas pattern.    Electronically signed by: Jackie Ibarra  Date:    12/22/2023  Time:    19:34        Assessment/Plan:      * Constipation  CT- Limited examination. Moderately large liquid stool within the patulous rectum.  Question possible diarrheal illness and/or fecal impaction.  Left renal cyst.     Prostatomegaly.  Osseous changes particularly of the spine.    IVF discontinued  Miralax daily, Dulcolax daily  Encourge oral intake  Use BSC    Urinary retention  Over 500 ml of urine in  bladder on scan. Ramirez placed  in ED.  -Will need voiding trial tomorrow      Weakness  Patient uses walker at baseline. His son reports that yesterday he was unable to get off of toilet and ambulate. PT/OT consulted. SNF placement being pursued.       Primary hypertension  Chronic, controlled. Latest blood pressure and vitals reviewed-     Temp:  [97.7 °F (36.5 °C)-98.4 °F (36.9 °C)]   Pulse:  [75-99]   Resp:  [16-18]   BP: (119-145)/(57-95)   SpO2:  [97 %-99 %] .   Home meds for hypertension were reviewed and noted below.   Hypertension Medications               amlodipine (NORVASC) 5 MG tablet Take 5 mg by mouth 2 (two) times daily.          Patient does not take BP medications at home but he is prescribed them.     Will utilize p.r.n. blood pressure medication only if patient's blood pressure greater than 180/110 and he develops symptoms such as worsening chest pain or shortness of breath.      VTE Risk Mitigation (From admission, onward)           Ordered     heparin (porcine) injection 5,000 Units  Every 8 hours         12/22/23 2203     IP VTE HIGH RISK PATIENT  Once         12/22/23 2203     Place sequential compression device  Until discontinued         12/22/23 2203                    Discharge Planning   PALOMO:      Code Status: Full Code   Is the patient medically ready for discharge?:     Reason for patient still in hospital (select all that apply): Patient trending condition and Pending disposition  Discharge Plan A: Skilled Nursing Facility   Discharge Delays: None known at this time              Maria Del Rosario Walls DNP  Department of Hospital Medicine   [facility] - Med Surg ([address])

## 2023-12-24 NOTE — PLAN OF CARE
SKILLED NURSING HOME ORDERS    12/26/2023  [facility] ([id number])  [facility] ([address])  [address]  [place] [address]  Dept: [phone]  Loc: [phone]     Admit to Nursing Home:      Diagnoses:  Active Hospital Problems    Diagnosis  POA    *Constipation [K59.00]  Yes    Weakness [R53.1]  Yes    Urinary retention [R33.9]  Yes    Primary hypertension [I10]  Yes      Resolved Hospital Problems   No resolved problems to display.       Patient is homebound due to:  Weakness/deconditioning/Degenerative spine changes    Allergies:  Review of patient's allergies indicates:   Allergen Reactions    Pcn [penicillins]        Vitals:  Routine    Diet: regular diet    Activities:   Activity as tolerated    Goals of Care Treatment Preferences:  Code Status: Full Code      Labs:  routine    Nursing Precautions:  Fall and Pressure ulcer prevention    Consults:   PT to evaluate and treat- 5 times a week and OT to evaluate and treat- 5 times a week     Miscellaneous Care: Routine Skin for Bedridden Patients:  Apply moisture barrier cream to all                   Diabetes Care:  none      Medications: Discontinue all previous medication orders, if any. See new list below.     Medication List  Start taking    Polyethylene glycol packet 17g  Take one packet by mouth daily    Bisacodyl suppository 10 mg  Rectal daily, as needed for constipation        ASK your doctor about these medications      amLODIPine 5 MG tablet  Commonly known as: NORVASC  Take 5 mg by mouth 2 (two) times daily.     aspirin 81 MG Chew  Take 81 mg by mouth once daily.          finasteride 5 mg tablet  Commonly known as: PROSCAR  Take 5 mg by mouth once daily.     tamsulosin 0.4 mg Cap  Commonly known as: FLOMAX  Take one cap by mouth daily                Immunizations Administered as of 12/24/2023       No immunizations on file.              _________________________________  Maria Del Rosario Walls DNP  12/24/2023

## 2023-12-25 LAB
ALBUMIN SERPL BCP-MCNC: 3.1 G/DL (ref 3.5–5.2)
ALP SERPL-CCNC: 67 U/L (ref 55–135)
ALT SERPL W/O P-5'-P-CCNC: 16 U/L (ref 10–44)
ANION GAP SERPL CALC-SCNC: 8 MMOL/L (ref 8–16)
AST SERPL-CCNC: 22 U/L (ref 10–40)
BASOPHILS # BLD AUTO: 0.05 K/UL (ref 0–0.2)
BASOPHILS NFR BLD: 0.5 % (ref 0–1.9)
BILIRUB SERPL-MCNC: 0.6 MG/DL (ref 0.1–1)
BUN SERPL-MCNC: 17 MG/DL (ref 8–23)
CALCIUM SERPL-MCNC: 8.6 MG/DL (ref 8.7–10.5)
CHLORIDE SERPL-SCNC: 106 MMOL/L (ref 95–110)
CO2 SERPL-SCNC: 22 MMOL/L (ref 23–29)
CREAT SERPL-MCNC: 1.2 MG/DL (ref 0.5–1.4)
DIFFERENTIAL METHOD: ABNORMAL
EOSINOPHIL # BLD AUTO: 0.1 K/UL (ref 0–0.5)
EOSINOPHIL NFR BLD: 1.2 % (ref 0–8)
ERYTHROCYTE [DISTWIDTH] IN BLOOD BY AUTOMATED COUNT: 12.9 % (ref 11.5–14.5)
EST. GFR  (NO RACE VARIABLE): 57 ML/MIN/1.73 M^2
GLUCOSE SERPL-MCNC: 102 MG/DL (ref 70–110)
HCT VFR BLD AUTO: 38.2 % (ref 40–54)
HGB BLD-MCNC: 13.1 G/DL (ref 14–18)
IMM GRANULOCYTES # BLD AUTO: 0.04 K/UL (ref 0–0.04)
IMM GRANULOCYTES NFR BLD AUTO: 0.4 % (ref 0–0.5)
LYMPHOCYTES # BLD AUTO: 2 K/UL (ref 1–4.8)
LYMPHOCYTES NFR BLD: 19.8 % (ref 18–48)
MAGNESIUM SERPL-MCNC: 2 MG/DL (ref 1.6–2.6)
MCH RBC QN AUTO: 31.3 PG (ref 27–31)
MCHC RBC AUTO-ENTMCNC: 34.3 G/DL (ref 32–36)
MCV RBC AUTO: 91 FL (ref 82–98)
MONOCYTES # BLD AUTO: 0.9 K/UL (ref 0.3–1)
MONOCYTES NFR BLD: 8.3 % (ref 4–15)
NEUTROPHILS # BLD AUTO: 7.1 K/UL (ref 1.8–7.7)
NEUTROPHILS NFR BLD: 69.8 % (ref 38–73)
NRBC BLD-RTO: 0 /100 WBC
PHOSPHATE SERPL-MCNC: 3.8 MG/DL (ref 2.7–4.5)
PLATELET # BLD AUTO: 306 K/UL (ref 150–450)
PMV BLD AUTO: 10.1 FL (ref 9.2–12.9)
POTASSIUM SERPL-SCNC: 4.1 MMOL/L (ref 3.5–5.1)
PROT SERPL-MCNC: 5.8 G/DL (ref 6–8.4)
RBC # BLD AUTO: 4.19 M/UL (ref 4.6–6.2)
SODIUM SERPL-SCNC: 136 MMOL/L (ref 136–145)
WBC # BLD AUTO: 10.22 K/UL (ref 3.9–12.7)

## 2023-12-25 PROCEDURE — 25000003 PHARM REV CODE 250: Performed by: NURSE PRACTITIONER

## 2023-12-25 PROCEDURE — 97110 THERAPEUTIC EXERCISES: CPT

## 2023-12-25 PROCEDURE — 96372 THER/PROPH/DIAG INJ SC/IM: CPT | Performed by: NURSE PRACTITIONER

## 2023-12-25 PROCEDURE — G0378 HOSPITAL OBSERVATION PER HR: HCPCS

## 2023-12-25 PROCEDURE — 84100 ASSAY OF PHOSPHORUS: CPT | Performed by: NURSE PRACTITIONER

## 2023-12-25 PROCEDURE — 85025 COMPLETE CBC W/AUTO DIFF WBC: CPT | Performed by: NURSE PRACTITIONER

## 2023-12-25 PROCEDURE — 83735 ASSAY OF MAGNESIUM: CPT | Performed by: NURSE PRACTITIONER

## 2023-12-25 PROCEDURE — 80053 COMPREHEN METABOLIC PANEL: CPT | Performed by: NURSE PRACTITIONER

## 2023-12-25 PROCEDURE — 97530 THERAPEUTIC ACTIVITIES: CPT

## 2023-12-25 PROCEDURE — 36415 COLL VENOUS BLD VENIPUNCTURE: CPT | Performed by: NURSE PRACTITIONER

## 2023-12-25 PROCEDURE — 97116 GAIT TRAINING THERAPY: CPT

## 2023-12-25 PROCEDURE — 63600175 PHARM REV CODE 636 W HCPCS: Performed by: NURSE PRACTITIONER

## 2023-12-25 RX ORDER — TALC
6 POWDER (GRAM) TOPICAL NIGHTLY PRN
Status: DISCONTINUED | OUTPATIENT
Start: 2023-12-25 | End: 2023-12-26 | Stop reason: HOSPADM

## 2023-12-25 RX ADMIN — FINASTERIDE 5 MG: 5 TABLET, FILM COATED ORAL at 09:12

## 2023-12-25 RX ADMIN — Medication 6 MG: at 01:12

## 2023-12-25 RX ADMIN — TAMSULOSIN HYDROCHLORIDE 0.4 MG: 0.4 CAPSULE ORAL at 09:12

## 2023-12-25 RX ADMIN — PRAVASTATIN SODIUM 20 MG: 20 TABLET ORAL at 09:12

## 2023-12-25 RX ADMIN — HEPARIN SODIUM 5000 UNITS: 5000 INJECTION INTRAVENOUS; SUBCUTANEOUS at 01:12

## 2023-12-25 RX ADMIN — ASPIRIN 81 MG CHEWABLE TABLET 81 MG: 81 TABLET CHEWABLE at 09:12

## 2023-12-25 RX ADMIN — AMLODIPINE BESYLATE 5 MG: 5 TABLET ORAL at 08:12

## 2023-12-25 RX ADMIN — BISACODYL 10 MG: 10 SUPPOSITORY RECTAL at 09:12

## 2023-12-25 RX ADMIN — SIMETHICONE 80 MG: 80 TABLET, CHEWABLE ORAL at 01:12

## 2023-12-25 RX ADMIN — AMLODIPINE BESYLATE 5 MG: 5 TABLET ORAL at 09:12

## 2023-12-25 RX ADMIN — HEPARIN SODIUM 5000 UNITS: 5000 INJECTION INTRAVENOUS; SUBCUTANEOUS at 05:12

## 2023-12-25 RX ADMIN — SIMETHICONE 80 MG: 80 TABLET, CHEWABLE ORAL at 08:12

## 2023-12-25 RX ADMIN — HEPARIN SODIUM 5000 UNITS: 5000 INJECTION INTRAVENOUS; SUBCUTANEOUS at 09:12

## 2023-12-25 RX ADMIN — POLYETHYLENE GLYCOL 3350 17 G: 17 POWDER, FOR SOLUTION ORAL at 09:12

## 2023-12-25 RX ADMIN — SIMETHICONE 80 MG: 80 TABLET, CHEWABLE ORAL at 09:12

## 2023-12-25 RX ADMIN — Medication 6 MG: at 08:12

## 2023-12-25 NOTE — PLAN OF CARE
A Ox2. VSS on RA and afebrile this shift. Up in the chair during the daytime.foleys out done. Self void due.  Free from injury or skin breakdown; Fall precautions maintained and call light in reach.  POC updated questions answered and comments acknowledged.  Purposeful hourly rounding completed this shift.    Problem: Infection  Goal: Absence of Infection Signs and Symptoms  Outcome: Ongoing, Progressing     Problem: Adult Inpatient Plan of Care  Goal: Plan of Care Review  Outcome: Ongoing, Progressing  Goal: Patient-Specific Goal (Individualized)  Outcome: Ongoing, Progressing  Goal: Absence of Hospital-Acquired Illness or Injury  Outcome: Ongoing, Progressing  Goal: Optimal Comfort and Wellbeing  Outcome: Ongoing, Progressing  Goal: Readiness for Transition of Care  Outcome: Ongoing, Progressing     Problem: Skin Injury Risk Increased  Goal: Skin Health and Integrity  Outcome: Ongoing, Progressing     Problem: Fall Injury Risk  Goal: Absence of Fall and Fall-Related Injury  Outcome: Ongoing, Progressing

## 2023-12-25 NOTE — PROGRESS NOTES
[facility] Surg [address] Medicine  Progress Note    Patient Name: Dimitri Johnson  MRN: [id number]  Patient Class: OP- Observation   Admission Date: 12/22/2023  Length of Stay: 0 days  Attending Physician: Michelle Gilbert MD  Primary Care Provider: Everette Neville MD        Subjective:     Principal Problem:Constipation        HPI:  No notes on file    Overview/Hospital Course:  No notes on file    Interval History: Mr Mosley has had 2 bms since admission,. He denies pan medication. He is hesitant to stand up and ambulate. Patient sitting in chair at this time with call light within reach.     Review of Systems   Constitutional:  Negative for activity change and fever.   HENT:  Negative for congestion.    Eyes:  Positive for visual disturbance (chronic).   Respiratory:  Negative for cough and shortness of breath.    Cardiovascular:  Negative for chest pain.   Gastrointestinal:  Negative for abdominal distention, abdominal pain, constipation, nausea and vomiting.   Musculoskeletal:  Positive for back pain.   Neurological:  Positive for weakness.   Psychiatric/Behavioral:  Negative for agitation.      Objective:     Vital Signs (Most Recent):  Temp: 99.8 °F (37.7 °C) (12/25/23 0742)  Pulse: 96 (12/25/23 0742)  Resp: 17 (12/25/23 0742)  BP: 121/64 (12/25/23 0742)  SpO2: 95 % (12/25/23 0742) Vital Signs (24h Range):  Temp:  [97.7 °F (36.5 °C)-99.8 °F (37.7 °C)] 99.8 °F (37.7 °C)  Pulse:  [64-96] 96  Resp:  [16-20] 17  SpO2:  [95 %-99 %] 95 %  BP: (112-140)/(56-73) 121/64     Weight: 61.6 kg (135 lb 12.9 oz)  Body mass index is 18.94 kg/m².    Intake/Output Summary (Last 24 hours) at 12/25/2023 1117  Last data filed at 12/25/2023 0900  Gross per 24 hour   Intake 180 ml   Output 1950 ml   Net -1770 ml         Physical Exam  Vitals reviewed.   HENT:      Head: Normocephalic.   Eyes:      Comments: Right eye keteroplasty     Pulmonary:      Effort: Pulmonary effort is normal.   Abdominal:      General:  There is no distension.      Palpations: Abdomen is soft.      Tenderness: There is no abdominal tenderness. There is no guarding.   Musculoskeletal:         General: No tenderness.      Right lower leg: No edema.      Left lower leg: No edema.   Skin:     General: Skin is dry.   Neurological:      Mental Status: He is alert.             Significant Labs: All pertinent labs within the past 24 hours have been reviewed.  BMP:   Recent Labs   Lab 12/25/23  0615         K 4.1      CO2 22*   BUN 17   CREATININE 1.2   CALCIUM 8.6*   MG 2.0     CBC:   Recent Labs   Lab 12/24/23  0526 12/25/23 0615   WBC 8.71 10.22   HGB 13.9* 13.1*   HCT 41.0 38.2*    306     CMP:   Recent Labs   Lab 12/24/23 0526 12/25/23 0615    136   K 3.9 4.1    106   CO2 21* 22*   GLU 93 102   BUN 15 17   CREATININE 1.2 1.2   CALCIUM 8.8 8.6*   PROT 5.9* 5.8*   ALBUMIN 3.2* 3.1*   BILITOT 0.7 0.6   ALKPHOS 73 67   AST 24 22   ALT 16 16   ANIONGAP 10 8       Significant Imaging: I have reviewed all pertinent imaging results/findings within the past 24 hours.  X-Ray Chest AP Portable  Narrative: EXAMINATION:  XR CHEST AP PORTABLE    CLINICAL HISTORY:  SNF placement;    TECHNIQUE:  Single frontal view of the chest was performed.    COMPARISON:  None    FINDINGS:  Cardiomediastinal contour is within normal limits.There is mild perihilar/bibasilar interstitial thickening.  No pneumothorax.No pleural effusions.  Impression: Mild interstitial thickening, possibly chronic.  Mild interstitial edema/pneumonitis not excluded.  Short-term PA and lateral chest follow-up could be performed to ensure stability, if indicated.    Electronically signed by: Osmin Mckay MD  Date:    12/24/2023  Time:    15:24        Assessment/Plan:      * Constipation  CT- Limited examination. Moderately large liquid stool within the patulous rectum.  Question possible diarrheal illness and/or fecal impaction.  Left renal cyst.      Prostatomegaly.  Osseous changes particularly of the spine.    IVF discontinued  Miralax daily, Dulcolax daily  Encourge oral intake  Use BSC  Two bowel movements since admission    Urinary retention  Over 500 ml of urine in bladder on scan. Ramirez placed  in ED.  -Voiding trial today      Weakness  Patient uses walker at baseline. His son reports that yesterday he was unable to get off of toilet and ambulate. PT/OT consulted. SNF placement being pursued.       Primary hypertension  Chronic, controlled. Latest blood pressure and vitals reviewed-     Temp:  [97.7 °F (36.5 °C)-98.4 °F (36.9 °C)]   Pulse:  [75-99]   Resp:  [16-18]   BP: (119-145)/(57-95)   SpO2:  [97 %-99 %] .   Home meds for hypertension were reviewed and noted below.   Hypertension Medications               amlodipine (NORVASC) 5 MG tablet Take 5 mg by mouth 2 (two) times daily.          Patient does not take BP medications at home but he is prescribed them.     Will utilize p.r.n. blood pressure medication only if patient's blood pressure greater than 180/110 and he develops symptoms such as worsening chest pain or shortness of breath.      VTE Risk Mitigation (From admission, onward)           Ordered     heparin (porcine) injection 5,000 Units  Every 8 hours         12/22/23 2203     IP VTE HIGH RISK PATIENT  Once         12/22/23 2203     Place sequential compression device  Until discontinued         12/22/23 2203                    Discharge Planning   PALOMO:      Code Status: Full Code   Is the patient medically ready for discharge?:     Reason for patient still in hospital (select all that apply): Pending disposition  Discharge Plan A: Skilled Nursing Facility   Discharge Delays: None known at this time              Maria Del Rosario Walls DNP  Department of Hospital Medicine   [facility] - Med Surg ([address])

## 2023-12-25 NOTE — ASSESSMENT & PLAN NOTE
CT- Limited examination. Moderately large liquid stool within the patulous rectum.  Question possible diarrheal illness and/or fecal impaction.  Left renal cyst.     Prostatomegaly.  Osseous changes particularly of the spine.    IVF discontinued  Miralax daily, Dulcolax daily  Encourge oral intake  Use BSC  Two bowel movements since admission

## 2023-12-25 NOTE — SUBJECTIVE & OBJECTIVE
Interval History: Mr Mosley has had 2 bms since admission,. He denies pan medication. He is hesitant to stand up and ambulate. Patient sitting in chair at this time with call light within reach.     Review of Systems   Constitutional:  Negative for activity change and fever.   HENT:  Negative for congestion.    Eyes:  Positive for visual disturbance (chronic).   Respiratory:  Negative for cough and shortness of breath.    Cardiovascular:  Negative for chest pain.   Gastrointestinal:  Negative for abdominal distention, abdominal pain, constipation, nausea and vomiting.   Musculoskeletal:  Positive for back pain.   Neurological:  Positive for weakness.   Psychiatric/Behavioral:  Negative for agitation.      Objective:     Vital Signs (Most Recent):  Temp: 99.8 °F (37.7 °C) (12/25/23 0742)  Pulse: 96 (12/25/23 0742)  Resp: 17 (12/25/23 0742)  BP: 121/64 (12/25/23 0742)  SpO2: 95 % (12/25/23 0742) Vital Signs (24h Range):  Temp:  [97.7 °F (36.5 °C)-99.8 °F (37.7 °C)] 99.8 °F (37.7 °C)  Pulse:  [64-96] 96  Resp:  [16-20] 17  SpO2:  [95 %-99 %] 95 %  BP: (112-140)/(56-73) 121/64     Weight: 61.6 kg (135 lb 12.9 oz)  Body mass index is 18.94 kg/m².    Intake/Output Summary (Last 24 hours) at 12/25/2023 1117  Last data filed at 12/25/2023 0900  Gross per 24 hour   Intake 180 ml   Output 1950 ml   Net -1770 ml         Physical Exam  Vitals reviewed.   HENT:      Head: Normocephalic.   Eyes:      Comments: Right eye keteroplasty     Pulmonary:      Effort: Pulmonary effort is normal.   Abdominal:      General: There is no distension.      Palpations: Abdomen is soft.      Tenderness: There is no abdominal tenderness. There is no guarding.   Musculoskeletal:         General: No tenderness.      Right lower leg: No edema.      Left lower leg: No edema.   Skin:     General: Skin is dry.   Neurological:      Mental Status: He is alert.             Significant Labs: All pertinent labs within the past 24 hours have been  reviewed.  BMP:   Recent Labs   Lab 12/25/23  0615         K 4.1      CO2 22*   BUN 17   CREATININE 1.2   CALCIUM 8.6*   MG 2.0     CBC:   Recent Labs   Lab 12/24/23  0526 12/25/23  0615   WBC 8.71 10.22   HGB 13.9* 13.1*   HCT 41.0 38.2*    306     CMP:   Recent Labs   Lab 12/24/23  0526 12/25/23  0615    136   K 3.9 4.1    106   CO2 21* 22*   GLU 93 102   BUN 15 17   CREATININE 1.2 1.2   CALCIUM 8.8 8.6*   PROT 5.9* 5.8*   ALBUMIN 3.2* 3.1*   BILITOT 0.7 0.6   ALKPHOS 73 67   AST 24 22   ALT 16 16   ANIONGAP 10 8       Significant Imaging: I have reviewed all pertinent imaging results/findings within the past 24 hours.  X-Ray Chest AP Portable  Narrative: EXAMINATION:  XR CHEST AP PORTABLE    CLINICAL HISTORY:  SNF placement;    TECHNIQUE:  Single frontal view of the chest was performed.    COMPARISON:  None    FINDINGS:  Cardiomediastinal contour is within normal limits.There is mild perihilar/bibasilar interstitial thickening.  No pneumothorax.No pleural effusions.  Impression: Mild interstitial thickening, possibly chronic.  Mild interstitial edema/pneumonitis not excluded.  Short-term PA and lateral chest follow-up could be performed to ensure stability, if indicated.    Electronically signed by: Osmin Mckay MD  Date:    12/24/2023  Time:    15:24

## 2023-12-25 NOTE — PT/OT/SLP PROGRESS
Physical Therapy Treatment    Patient Name:  Dimitri Johnson   MRN:  [id number]    Recommendations:     Discharge Recommendations: Moderate Intensity Therapy  Discharge Equipment Recommendations: bedside commode  Barriers to discharge: Inaccessible home and Decreased caregiver support    Assessment:     Dimitri Johnson is a 89 y.o. male admitted with a medical diagnosis of Constipation.  He presents with the following impairments/functional limitations: weakness, impaired endurance, impaired self care skills, impaired functional mobility, impaired balance, gait instability, visual deficits, impaired cognition, decreased upper extremity function, decreased lower extremity function, decreased coordination, decreased safety awareness, decreased ROM, impaired joint extensibility.    Patient with increased anxiety regarding his functional status, requiring step by step instructions for all mobility. Good effort for transfers and gait training but limited carryover requiring more intense therapy than available at home. Rec upgraded to [facility].     Prior to admission pt was modified independent with mobility and self-care and there is expectation of returning to prior level of function to maintain independence avoiding readmission. Pt is at high risk of unplanned readmission due to fall risk and lack of 24 hour caregiver in prior setting.      Rehab Prognosis: Good; patient would benefit from acute skilled PT services to address these deficits and reach maximum level of function.    Recent Surgery: * No surgery found *      Plan:     During this hospitalization, patient to be seen 5 x/week to address the identified rehab impairments via gait training, therapeutic activities, therapeutic exercises, neuromuscular re-education and progress toward the following goals:    Plan of Care Expires:  01/23/24    Subjective     Chief Complaint: Worried about trying to stand or walk with therapist without second person for support  Patient/Family  "Comments/goals: Goal to get stronger and to not fall, interested in SNF placement; Patient agreeable to PT treatment.  Pain/Comfort:  Pain Rating 1: 0/10  Pain Rating Post-Intervention 1: 0/10      Objective:     Communicated with MORENO Souza prior to session.  Patient found up in chair with peripheral IV, gray catheter upon PT entry to room.     General Precautions: Standard, fall, vision impaired  Orthopedic Precautions: N/A  Braces: N/A  Respiratory Status: Room air     Patient donned non slip socks for OOB mobility.    Functional Mobility:  Transfers:     Sit to Stand:  minimum assistance and moderate assistance with rolling walker  ModA progressing to Annabella  Cues for preparatory movements to increase independence including scoot to EOB, flexion of knees for feet placed below COG, and anterior flexion of trunk  Requires repeated cueing despite attempts at teach back, pt with increased anxiety about doing something "wrong" and falling  Gait: x5 ft with RW and Annabella.   Slow gait with increased B stance time, decreased nena, and decreased step length (B).   No overt knee buckling, maintains crouched posture with forward flexion of head/trunk.   Constant feedback given to alleviate pt's fear of falling, chair follow for support  Balance: Static standing 3x60 sec with CGA and UE support on RW.   No overt sway or LOB noted, when cued to correct posture pt with posterior sway requiring Annabella to prevent fall.       AM-PAC 6 CLICK MOBILITY  Sitting down on and standing up from a chair with arms (e.g., wheelchair, bedside commode, etc.): 3  Moving from lying on back to sitting on the side of the bed?: 3  Moving to and from a bed to a chair (including a wheelchair)?: 3  Need to walk in hospital room?: 3  Climbing 3-5 steps with a railing?: 2       Treatment & Education:  Pt performed supine therapeutic exercises including ankle pumps, quad sets, glute sets and seated LAQs and shoulder retraction x 15 reps with verbal and " tactile cues.   Transfers and gait as above, blocked practice of transfers with standing bout for endurance with each stand    Patient left up in chair with all lines intact, call button in reach, RN  notified, and white board updated ..    GOALS:   Multidisciplinary Problems       Physical Therapy Goals          Problem: Physical Therapy    Goal Priority Disciplines Outcome Goal Variances Interventions   Physical Therapy Goal     PT, PT/OT Ongoing, Progressing     Description: Goals to be met by: 2024    Patient will increase functional independence with mobility by performin. Sit<>stand with SBA with RW.  2. Gait x 20 ft feet with RW with SBA.  3. Supine<>sit with SBA.                        Time Tracking:     PT Received On: 23  PT Start Time: 1218     PT Stop Time: 1256  PT Total Time (min): 38 min     Billable Minutes: Gait Training 10, Therapeutic Activity 15, and Therapeutic Exercise 13    Treatment Type: Treatment  PT/PTA: PT     Number of PTA visits since last PT visit: 0     2023

## 2023-12-26 ENCOUNTER — HOSPITAL ENCOUNTER (INPATIENT)
Facility: HOSPITAL | Age: 88
LOS: 28 days | Discharge: NURSING, REHAB OR LONG-TERM CARE | DRG: 391 | End: 2024-01-23
Attending: HOSPITALIST | Admitting: HOSPITALIST
Payer: MEDICARE

## 2023-12-26 VITALS
DIASTOLIC BLOOD PRESSURE: 65 MMHG | OXYGEN SATURATION: 97 % | WEIGHT: 135.81 LBS | SYSTOLIC BLOOD PRESSURE: 124 MMHG | BODY MASS INDEX: 19.01 KG/M2 | HEART RATE: 79 BPM | TEMPERATURE: 98 F | HEIGHT: 71 IN | RESPIRATION RATE: 20 BRPM

## 2023-12-26 DIAGNOSIS — K59.00 CONSTIPATION: ICD-10-CM

## 2023-12-26 DIAGNOSIS — M71.20 SYNOVIAL CYST OF POPLITEAL SPACE, UNSPECIFIED LATERALITY: ICD-10-CM

## 2023-12-26 DIAGNOSIS — N13.8 ENLARGED PROSTATE WITH URINARY OBSTRUCTION: ICD-10-CM

## 2023-12-26 DIAGNOSIS — N40.1 ENLARGED PROSTATE WITH URINARY OBSTRUCTION: ICD-10-CM

## 2023-12-26 DIAGNOSIS — R53.1 WEAKNESS: Primary | ICD-10-CM

## 2023-12-26 LAB
ANION GAP SERPL CALC-SCNC: 8 MMOL/L (ref 8–16)
BACTERIA #/AREA URNS HPF: ABNORMAL /HPF
BILIRUB UR QL STRIP: NEGATIVE
BUN SERPL-MCNC: 23 MG/DL (ref 8–23)
CALCIUM SERPL-MCNC: 9 MG/DL (ref 8.7–10.5)
CHLORIDE SERPL-SCNC: 107 MMOL/L (ref 95–110)
CLARITY UR: ABNORMAL
CO2 SERPL-SCNC: 21 MMOL/L (ref 23–29)
COLOR UR: YELLOW
CREAT SERPL-MCNC: 1 MG/DL (ref 0.5–1.4)
ERYTHROCYTE [DISTWIDTH] IN BLOOD BY AUTOMATED COUNT: 12.9 % (ref 11.5–14.5)
EST. GFR  (NO RACE VARIABLE): >60 ML/MIN/1.73 M^2
GLUCOSE SERPL-MCNC: 101 MG/DL (ref 70–110)
GLUCOSE UR QL STRIP: NEGATIVE
HCT VFR BLD AUTO: 39.9 % (ref 40–54)
HGB BLD-MCNC: 13.6 G/DL (ref 14–18)
HGB UR QL STRIP: ABNORMAL
KETONES UR QL STRIP: NEGATIVE
LEUKOCYTE ESTERASE UR QL STRIP: ABNORMAL
MCH RBC QN AUTO: 31.3 PG (ref 27–31)
MCHC RBC AUTO-ENTMCNC: 34.1 G/DL (ref 32–36)
MCV RBC AUTO: 92 FL (ref 82–98)
MICROSCOPIC COMMENT: ABNORMAL
NITRITE UR QL STRIP: NEGATIVE
PH UR STRIP: 6 [PH] (ref 5–8)
PLATELET # BLD AUTO: 303 K/UL (ref 150–450)
PMV BLD AUTO: 10.3 FL (ref 9.2–12.9)
POTASSIUM SERPL-SCNC: 4.3 MMOL/L (ref 3.5–5.1)
PROT UR QL STRIP: NEGATIVE
RBC # BLD AUTO: 4.34 M/UL (ref 4.6–6.2)
RBC #/AREA URNS HPF: 5 /HPF (ref 0–4)
SARS-COV-2 RDRP RESP QL NAA+PROBE: NEGATIVE
SODIUM SERPL-SCNC: 136 MMOL/L (ref 136–145)
SP GR UR STRIP: 1.01 (ref 1–1.03)
URN SPEC COLLECT METH UR: ABNORMAL
UROBILINOGEN UR STRIP-ACNC: NEGATIVE EU/DL
WBC # BLD AUTO: 12 K/UL (ref 3.9–12.7)
WBC #/AREA URNS HPF: 16 /HPF (ref 0–5)
WBC CLUMPS URNS QL MICRO: ABNORMAL

## 2023-12-26 PROCEDURE — 36415 COLL VENOUS BLD VENIPUNCTURE: CPT | Performed by: NURSE PRACTITIONER

## 2023-12-26 PROCEDURE — 80048 BASIC METABOLIC PNL TOTAL CA: CPT | Performed by: NURSE PRACTITIONER

## 2023-12-26 PROCEDURE — 96372 THER/PROPH/DIAG INJ SC/IM: CPT | Performed by: NURSE PRACTITIONER

## 2023-12-26 PROCEDURE — U0002 COVID-19 LAB TEST NON-CDC: HCPCS | Performed by: NURSE PRACTITIONER

## 2023-12-26 PROCEDURE — 25000003 PHARM REV CODE 250: Performed by: NURSE PRACTITIONER

## 2023-12-26 PROCEDURE — 87088 URINE BACTERIA CULTURE: CPT | Performed by: NURSE PRACTITIONER

## 2023-12-26 PROCEDURE — 85027 COMPLETE CBC AUTOMATED: CPT | Performed by: NURSE PRACTITIONER

## 2023-12-26 PROCEDURE — 87086 URINE CULTURE/COLONY COUNT: CPT | Performed by: NURSE PRACTITIONER

## 2023-12-26 PROCEDURE — 81000 URINALYSIS NONAUTO W/SCOPE: CPT | Performed by: NURSE PRACTITIONER

## 2023-12-26 PROCEDURE — 97530 THERAPEUTIC ACTIVITIES: CPT

## 2023-12-26 PROCEDURE — 63600175 PHARM REV CODE 636 W HCPCS: Performed by: NURSE PRACTITIONER

## 2023-12-26 PROCEDURE — 25000003 PHARM REV CODE 250: Performed by: HOSPITALIST

## 2023-12-26 PROCEDURE — 11000004 HC SNF PRIVATE

## 2023-12-26 PROCEDURE — G0378 HOSPITAL OBSERVATION PER HR: HCPCS

## 2023-12-26 RX ORDER — BISACODYL 10 MG/1
10 SUPPOSITORY RECTAL DAILY PRN
Status: DISCONTINUED | OUTPATIENT
Start: 2023-12-26 | End: 2024-01-23 | Stop reason: HOSPADM

## 2023-12-26 RX ORDER — TAMSULOSIN HYDROCHLORIDE 0.4 MG/1
0.4 CAPSULE ORAL DAILY
Status: DISCONTINUED | OUTPATIENT
Start: 2023-12-27 | End: 2024-01-11

## 2023-12-26 RX ORDER — TAMSULOSIN HYDROCHLORIDE 0.4 MG/1
0.4 CAPSULE ORAL DAILY
Qty: 1 CAPSULE | Refills: 0 | Status: ON HOLD
Start: 2023-12-26 | End: 2024-01-22 | Stop reason: HOSPADM

## 2023-12-26 RX ORDER — ACETAMINOPHEN 325 MG/1
650 TABLET ORAL EVERY 6 HOURS PRN
Status: DISCONTINUED | OUTPATIENT
Start: 2023-12-26 | End: 2024-01-10

## 2023-12-26 RX ORDER — ACETAMINOPHEN 325 MG/1
650 TABLET ORAL EVERY 6 HOURS PRN
Status: DISCONTINUED | OUTPATIENT
Start: 2023-12-26 | End: 2023-12-28

## 2023-12-26 RX ORDER — TALC
6 POWDER (GRAM) TOPICAL NIGHTLY PRN
Status: DISCONTINUED | OUTPATIENT
Start: 2023-12-26 | End: 2023-12-27

## 2023-12-26 RX ORDER — POLYETHYLENE GLYCOL 3350 17 G/17G
17 POWDER, FOR SOLUTION ORAL DAILY
Qty: 30 EACH | Refills: 0 | Status: ON HOLD
Start: 2023-12-27 | End: 2024-01-22 | Stop reason: HOSPADM

## 2023-12-26 RX ORDER — BISACODYL 10 MG
10 SUPPOSITORY, RECTAL RECTAL DAILY PRN
Qty: 10 SUPPOSITORY | Refills: 0
Start: 2023-12-26

## 2023-12-26 RX ORDER — NAPROXEN SODIUM 220 MG/1
81 TABLET, FILM COATED ORAL DAILY
Status: DISCONTINUED | OUTPATIENT
Start: 2023-12-27 | End: 2024-01-23 | Stop reason: HOSPADM

## 2023-12-26 RX ORDER — FINASTERIDE 5 MG/1
5 TABLET, FILM COATED ORAL DAILY
Status: DISCONTINUED | OUTPATIENT
Start: 2023-12-27 | End: 2024-01-23 | Stop reason: HOSPADM

## 2023-12-26 RX ORDER — AMLODIPINE BESYLATE 5 MG/1
5 TABLET ORAL 2 TIMES DAILY
Status: DISCONTINUED | OUTPATIENT
Start: 2023-12-26 | End: 2023-12-28

## 2023-12-26 RX ORDER — POLYETHYLENE GLYCOL 3350 17 G/17G
17 POWDER, FOR SOLUTION ORAL DAILY
Status: DISCONTINUED | OUTPATIENT
Start: 2023-12-27 | End: 2024-01-09

## 2023-12-26 RX ORDER — CALCIUM CARBONATE 200(500)MG
500 TABLET,CHEWABLE ORAL 2 TIMES DAILY PRN
Status: DISCONTINUED | OUTPATIENT
Start: 2023-12-26 | End: 2024-01-23 | Stop reason: HOSPADM

## 2023-12-26 RX ORDER — AMOXICILLIN 250 MG
1 CAPSULE ORAL 2 TIMES DAILY
Status: DISCONTINUED | OUTPATIENT
Start: 2023-12-26 | End: 2024-01-09

## 2023-12-26 RX ADMIN — ASPIRIN 81 MG CHEWABLE TABLET 81 MG: 81 TABLET CHEWABLE at 09:12

## 2023-12-26 RX ADMIN — AMLODIPINE BESYLATE 5 MG: 5 TABLET ORAL at 09:12

## 2023-12-26 RX ADMIN — SIMETHICONE 80 MG: 80 TABLET, CHEWABLE ORAL at 02:12

## 2023-12-26 RX ADMIN — SIMETHICONE 80 MG: 80 TABLET, CHEWABLE ORAL at 09:12

## 2023-12-26 RX ADMIN — HEPARIN SODIUM 5000 UNITS: 5000 INJECTION INTRAVENOUS; SUBCUTANEOUS at 02:12

## 2023-12-26 RX ADMIN — HEPARIN SODIUM 5000 UNITS: 5000 INJECTION INTRAVENOUS; SUBCUTANEOUS at 05:12

## 2023-12-26 RX ADMIN — BISACODYL 10 MG: 10 SUPPOSITORY RECTAL at 09:12

## 2023-12-26 RX ADMIN — POLYETHYLENE GLYCOL 3350 17 G: 17 POWDER, FOR SOLUTION ORAL at 09:12

## 2023-12-26 RX ADMIN — Medication 6 MG: at 09:12

## 2023-12-26 RX ADMIN — PRAVASTATIN SODIUM 20 MG: 20 TABLET ORAL at 09:12

## 2023-12-26 RX ADMIN — TAMSULOSIN HYDROCHLORIDE 0.4 MG: 0.4 CAPSULE ORAL at 09:12

## 2023-12-26 RX ADMIN — SENNOSIDES AND DOCUSATE SODIUM 1 TABLET: 8.6; 5 TABLET ORAL at 09:12

## 2023-12-26 RX ADMIN — FINASTERIDE 5 MG: 5 TABLET, FILM COATED ORAL at 09:12

## 2023-12-26 NOTE — PT/OT/SLP PROGRESS
"Physical Therapy Treatment    Patient Name:  Dimitri Johnson   MRN:  [id number]    Recommendations:     Discharge Recommendations: Moderate Intensity Therapy  Discharge Equipment Recommendations: bedside commode  Barriers to discharge: Inaccessible home, Decreased caregiver support, and weakness    Assessment:     Dimitri Johnson is a 89 y.o. male admitted with a medical diagnosis of Constipation.  He presents with the following impairments/functional limitations: weakness, impaired endurance, gait instability, impaired functional mobility, decreased safety awareness, impaired cognition, impaired balance, decreased lower extremity function.    Pt progressing with therapy, able to t/f OOB to chair and tolerate OOB x 3 hours today. Pt fearful of falling and self-limiting at times but participated well with only min assist required for transfers and gait. Pt required significant cueing throughout sessions for technique and completion of task.     Rehab Prognosis: Good; patient would benefit from acute skilled PT services to address these deficits and reach maximum level of function.    Recent Surgery: * No surgery found *      Plan:     During this hospitalization, patient to be seen 5 x/week to address the identified rehab impairments via gait training, therapeutic activities, therapeutic exercises, neuromuscular re-education and progress toward the following goals:    Plan of Care Expires:  01/23/24    Subjective     Chief Complaint: "I am not confident in my ability to walk. I am not confident that you can get me up by yourself"  Patient/Family Comments/goals: Pt agreeable to therapy, reports needing to have a BM    Pain/Comfort:  Pain Rating 1: 0/10      Objective:     Communicated with MORENO Souza prior to session.  Patient found supine with Condom Catheter, peripheral IV upon PT entry to room.     General Precautions: Standard, fall  Orthopedic Precautions: N/A  Braces: N/A  Respiratory Status: Room air     Functional " Mobility/Treatment:  AM session- Pt t/f from supine to sit at EOB with CGA. Sit to stand with RW min A with cueing for hand placement. Gait 6' with RW to bedside chair Min A. T/f to sit in chair Min A with cueing for hand placement and assist to control descent. Pt left seated in chair with needs in reach.  PM session- P.T returned after 3 hours to assist pt back to bed, pt still in bedside chair. Pt reporting need to have a BM. Sit to stand with RW Min A. Completed stand step t/f from chair to BSC with RW Min A. Pt able to complete mauricio-care seated with setup assist. Sit to stand with RW Min A. Gait 4' with RW to sit at EOB Min A. Pt returned to supine with CGA.       AM-PAC 6 CLICK MOBILITY  Turning over in bed (including adjusting bedclothes, sheets and blankets)?: 4  Sitting down on and standing up from a chair with arms (e.g., wheelchair, bedside commode, etc.): 3  Moving from lying on back to sitting on the side of the bed?: 3  Moving to and from a bed to a chair (including a wheelchair)?: 3  Need to walk in hospital room?: 2  Climbing 3-5 steps with a railing?: 2  Basic Mobility Total Score: 17     Education: Pt educated on role of P.T, POC, mobility training       Patient left supine with all lines intact, call button in reach, and bed alarm on..    GOALS:   Multidisciplinary Problems       Physical Therapy Goals          Problem: Physical Therapy    Goal Priority Disciplines Outcome Goal Variances Interventions   Physical Therapy Goal     PT, PT/OT Ongoing, Progressing     Description: Goals to be met by: 2024    Patient will increase functional independence with mobility by performin. Sit<>stand with SBA with RW.  2. Gait x 20 ft feet with RW with SBA.  3. Supine<>sit with SBA.                        Time Tracking:     PT Received On: 23  PT Start/Stop Time AM: 0354-3183     PT Start/Stop Time PM: 7224-4413  PT Total Time (min): 28    Billable Minutes: Therapeutic Activity 25    Treatment  Type: Treatment  PT/PTA: PT     Number of PTA visits since last PT visit: 0     12/26/2023

## 2023-12-26 NOTE — HOSPITAL COURSE
Mr Mosley was admitted with weakness and constipation. He had associated stomach discomfort that was relieved after having a large bowel movement. Daily miralax and prn dulcolax regimen started. Patient has decreased mobility due to his degenerative disc disease and deconditioning. He is hesitant to walk and stand up. His strength is intact. He worked with PT and OT who both recommended moderate intensity therapy. Discussed SNF therapy with Mr Mosley and he has been in agreement to go.  Updated son by voicemail, as I have been unable ot reach by phone since the second day of admission. Patient to be discharged to [facility] this evening.

## 2023-12-26 NOTE — PLAN OF CARE
Patient will discharge to [facility]. Transportation has been scheduled for 5:30 pm. Attending Nurse Libby was given information to call room [phone]   12/26/23 1511   Final Note   Assessment Type Final Discharge Note   Anticipated Discharge Disposition SNF   Hospital Resources/Appts/Education Provided Provided patient/caregiver with written discharge plan information   Post-Acute Status   Post-Acute Authorization Other   Post-Acute Placement Status Set-up Complete/Auth obtained   Discharge Delays None known at this time     [facility] ([address])  Discharge Final Note    Primary Care Provider: Everette Neville MD    Expected Discharge Date: 12/26/2023    Final Discharge Note (most recent)       Final Note - 12/26/23 1511          Final Note    Assessment Type Final Discharge Note (P)      Anticipated Discharge Disposition Skilled Nursing Facility (P)      Hospital Resources/Appts/Education Provided Provided patient/caregiver with written discharge plan information (P)         Post-Acute Status    Post-Acute Authorization Other (P)      Post-Acute Placement Status Set-up Complete/Auth obtained (P)      Discharge Delays None known at this time (P)                      Important Message from Medicare             Contact Info       Everette Nevilel MD   Specialty: Family Medicine   Relationship: PCP - General    [address]  [address]  [facility] MULTI-SPECIALTY  [facility] [address]   Phone: [phone]       Next Steps: Follow up

## 2023-12-26 NOTE — ASSESSMENT & PLAN NOTE
Over 500 ml of urine in bladder on scan. Ramirez placed  in ED.  -Voiding trial 12/25  -No further urinary retention  -Condom catheter in place with clear yellow urine in collection bag; at lease 500 cc

## 2023-12-26 NOTE — PLAN OF CARE
Problem: Infection  Goal: Absence of Infection Signs and Symptoms  Outcome: Ongoing, Progressing     Problem: Adult Inpatient Plan of Care  Goal: Plan of Care Review  Outcome: Ongoing, Progressing  Flowsheets (Taken 12/26/2023 1633)  Plan of Care Reviewed With: patient  Goal: Patient-Specific Goal (Individualized)  Outcome: Ongoing, Progressing  Goal: Absence of Hospital-Acquired Illness or Injury  Outcome: Ongoing, Progressing  Goal: Optimal Comfort and Wellbeing  Outcome: Ongoing, Progressing  Goal: Readiness for Transition of Care  Outcome: Ongoing, Progressing     Problem: Skin Injury Risk Increased  Goal: Skin Health and Integrity  Outcome: Ongoing, Progressing     Problem: Fall Injury Risk  Goal: Absence of Fall and Fall-Related Injury  Outcome: Ongoing, Progressing

## 2023-12-26 NOTE — PT/OT/SLP PROGRESS
"Occupational Therapy   Treatment    Name: Dimitri Johnson  MRN: [id number]  Admitting Diagnosis:  Constipation       Recommendations:     Discharge Recommendations: Moderate Intensity Therapy  Discharge Equipment Recommendations:  bedside commode  Barriers to discharge:   (current functional level)    Assessment:     Dimitri Johnson is a 89 y.o. male with a medical diagnosis of Constipation.  He presents with no pain. Performance deficits affecting function are weakness, impaired endurance, impaired self care skills, impaired functional mobility, gait instability, impaired balance, impaired cognition, decreased upper extremity function, decreased lower extremity function, decreased coordination, decreased safety awareness, decreased ROM, impaired joint extensibility. Pt agreeable to participating in therapy upon arrival to room. Pt able to perform sit <> stand transfer from chair with Min A, RW.  Adequate ROM and strength noted in (B) UE exercises and functional transfers.      Pt very anxious when performing sit <> stand transfer and taking steps so encouragement, education, and redirection provided.  Pt is making progress towards goals and would continue to benefit from skilled OT services to address problems listed above and increase independence with ADLs.  Moderate intensity therapy is recommended upon d/c from acute care to further address deficits and help pt improve overall functional independence.       Rehab Prognosis:  Good; patient would benefit from acute skilled OT services to address these deficits and reach maximum level of function.       Plan:     Patient to be seen 4 x/week to address the above listed problems via self-care/home management, therapeutic activities, therapeutic exercises  Plan of Care Expires: 01/06/23  Plan of Care Reviewed with: patient    Subjective     Prior to sit <> stand transfer and during pt stated, "I feel very insecure with standing."      Chief Complaint: anxious with mobility "   Patient/Family Comments/goals: Increase strength   Pain/Comfort:  Pain Rating 1: 0/10  Pain Rating Post-Intervention 1: 0/10    Objective:     Communicated with: RN prior to session.  Patient found up in chair with peripheral IV, gray catheter upon OT entry to room.  PT just completing session upon arrival.    General Precautions: Standard, fall, vision impaired    Orthopedic Precautions:N/A  Braces: N/A  Respiratory Status: Room air     Occupational Performance:     Bed Mobility:    Not assessed 2* pt up in chair upon arrival.    Functional Mobility/Transfers:  Sit <> Stand: Min A, RW x 2 trials from chair  Increased cues and time required for set up 2* pt very anxious  Able to follow commands for positioning  Functional Mobility: Pt took steps in place 2 sets x 10 reps with CGA, RW.  Pt very anxious during activity  No LOB noted    Activities of Daily Living:  Grooming: Pt declined grooming ADLs seated up in chair.      [facility] 6 Click ADL: 18    Treatment & Education:  *Pt educated on role of OT in acute care setting  *Pt performed sit <> stand transfer x 2 trials from chair; cues for technique and positioning provided  *Pt performed 2 trials x 10 steps of marching in place; cues for positioning and sequencing   *Pt performed UB exercises to promote increased endurance and ROM needed for ADLs:  -shoulder flexion  -horizontal shoulder abduction/adduction  -bicep curls  -forward circular rows  -scapular retraction  *POC reviewed with pt    Patient left up in chair with all lines intact, call button in reach, and RN (Libby) notified    GOALS:   Multidisciplinary Problems       Occupational Therapy Goals          Problem: Occupational Therapy    Goal Priority Disciplines Outcome Interventions   Occupational Therapy Goal     OT, PT/OT Ongoing, Progressing    Description: Goals to be met by: 1/6/2024     Patient will increase functional independence with ADLs by performing:    UE Dressing with Stand-by  Assistance.  LE Dressing with Stand-by Assistance.  Grooming while standing at sink with Stand-by Assistance.  Toileting from bedside commode with Stand-by Assistance for hygiene and clothing management.   Toilet transfer to bedside commode with Stand-by Assistance.                         Time Tracking:     OT Date of Treatment: 12/26/23  OT Start Time: 1036  OT Stop Time: 1100  OT Total Time (min): 24 min    Billable Minutes:Therapeutic Activity 24    OT/SALVADOR: OT        RK Parekh  12/26/2023

## 2023-12-26 NOTE — PLAN OF CARE
4:53 PM  Transport arrived . Handoff  was given to compa in [facility].   IV catheter removed, catheter tip intact. External catheter removed. Patient left the floor with transporter via a wheel chair.

## 2023-12-26 NOTE — PLAN OF CARE
NURSING HOME ORDERS    12/26/2023  [facility] ([facility])  [facility] ([address])  [address]  [place] [address]  Dept: [phone]  Loc: [phone]     Admit to Nursing Home:      Diagnoses:  Active Hospital Problems    Diagnosis  POA    *Constipation [K59.00]  Yes    Weakness [R53.1]  Yes    Urinary retention [R33.9]  Yes    Primary hypertension [I10]  Yes      Resolved Hospital Problems   No resolved problems to display.       Patient is homebound due to:  weakness, degenerative disc disease    Allergies:  Review of patient's allergies indicates:   Allergen Reactions    Pcn [penicillins]        Vitals:  Routine    Diet: regular diet    Activities:   Activity as tolerated    Goals of Care Treatment Preferences:  Code Status: Full Code      Labs:  routine    Nursing Precautions:  Fall and Pressure ulcer prevention    Consults:   PT to evaluate and treat- 5 times a week and OT to evaluate and treat- 5 times a week     Miscellaneous Care: Routine Skin for Bedridden Patients:  Apply moisture barrier cream to all                   Diabetes Care:  none      Medications: Discontinue all previous medication orders, if any. See new list below.     Medication List        START taking these medications      bisacodyL 10 mg Supp  Commonly known as: DULCOLAX  Place 1 suppository (10 mg total) rectally daily as needed (constipation).     polyethylene glycol 17 gram Pwpk  Commonly known as: GLYCOLAX  Take 17 g by mouth once daily.  Start taking on: December 27, 2023            CHANGE how you take these medications      tamsulosin 0.4 mg Cap  Commonly known as: FLOMAX  Take 1 capsule (0.4 mg total) by mouth once daily.  What changed:   how much to take  how to take this  when to take this            CONTINUE taking these medications      amLODIPine 5 MG tablet  Commonly known as: NORVASC  Take 5 mg by mouth 2 (two) times daily.     aspirin 81 MG Chew  Take 81 mg by mouth once daily.      finasteride 5 mg tablet  Commonly known as: PROSCAR  Take 5 mg by mouth once daily.            STOP taking these medications      AZOPT 1 % ophthalmic suspension  Generic drug: brinzolamide                Immunizations Administered as of 12/26/2023       No immunizations on file.                  _________________________________  Maria Del Rosario Walls DNP  12/26/2023

## 2023-12-26 NOTE — DISCHARGE SUMMARY
[facility] [address] Medicine  Discharge Summary      Patient Name: Dimitri Johnson  MRN: [id number]  SUKHDEV: [id number]  Patient Class: OP- Observation  Admission Date: 12/22/2023  Hospital Length of Stay: 0 days  Discharge Date and Time:  12/26/2023 3:37 PM  Attending Physician: Michelle Gilbert MD   Discharging Provider: Maria Del Rosario Walls DNP  Primary Care Provider: Everette eNville MD    Primary Care Team: Networked reference to record PCT     HPI:   No notes on file    * No surgery found *      Hospital Course:   Mr Mosley was admitted with weakness and constipation. He had associated stomach discomfort that was relieved after having a large bowel movement. Daily miralax and prn dulcolax regimen started. Patient has decreased mobility due to his degenerative disc disease and deconditioning. He is hesitant to walk and stand up. His strength is intact. He worked with PT and OT who both recommended moderate intensity therapy. Discussed SNF therapy with Mr Mosley and he has been in agreement to go.  Updated son by voicemail, as I have been unable ot reach by phone since the second day of admission. Patient to be discharged to [facility] this evening.      Goals of Care Treatment Preferences:  Code Status: Full Code      Consults:     Renal/  Urinary retention  Over 500 ml of urine in bladder on scan. Ramirez placed  in ED.  -Voiding trial 12/25  -No further urinary retention  -Condom catheter in place with clear yellow urine in collection bag; at lease 500 cc        Final Active Diagnoses:    Diagnosis Date Noted POA    PRINCIPAL PROBLEM:  Constipation [K59.00] 12/22/2023 Yes    Weakness [R53.1] 12/23/2023 Yes    Urinary retention [R33.9] 12/23/2023 Yes    Primary hypertension [I10] 12/22/2023 Yes      Problems Resolved During this Admission:       Discharged Condition:  baseline: alert, oriented, pain free    Disposition: Home or Self Care    Follow Up:   Follow-up Information       Everette Neville  MD [facility] Follow up.    Specialty: Family Medicine  Contact information:  [address]  [address]  [facility] MULTI-SPECIALTY  [facility] [address]  [phone]                           Patient Instructions:      Ambulatory referral/consult to Outpatient Case Management   Referral Priority: Routine Referral Type: Consultation   Referral Reason: Specialty Services Required   Number of Visits Requested: 1     Diet Adult Regular     Notify your health care provider if you experience any of the following:  temperature >100.4     Notify your health care provider if you experience any of the following:  redness, tenderness, or signs of infection (pain, swelling, redness, odor or green/yellow discharge around incision site)     Notify your health care provider if you experience any of the following:  difficulty breathing or increased cough     Activity as tolerated       Significant Diagnostic Studies: N/A    Pending Diagnostic Studies:       None           Medications:  Reconciled Home Medications:      Medication List        START taking these medications      bisacodyL 10 mg Supp  Commonly known as: DULCOLAX  Place 1 suppository (10 mg total) rectally daily as needed (constipation).     polyethylene glycol 17 gram Pwpk  Commonly known as: GLYCOLAX  Take 17 g by mouth once daily.  Start taking on: December 27, 2023            CHANGE how you take these medications      tamsulosin 0.4 mg Cap  Commonly known as: FLOMAX  Take 1 capsule (0.4 mg total) by mouth once daily.  What changed:   how much to take  how to take this  when to take this            CONTINUE taking these medications      amLODIPine 5 MG tablet  Commonly known as: NORVASC  Take 5 mg by mouth 2 (two) times daily.     aspirin 81 MG Chew  Take 81 mg by mouth once daily.     finasteride 5 mg tablet  Commonly known as: PROSCAR  Take 5 mg by mouth once daily.            STOP taking these medications      AZOPT 1 % ophthalmic suspension  Generic drug:  brinzolamide              Indwelling Lines/Drains at time of discharge:   Lines/Drains/Airways       Drain  Duration             Male External Urinary Catheter 12/26/23 1100 <1 day                    Time spent on the discharge of patient: 40 minutes         Maria Del Rosario Walls DNP  Department of Hospital Medicine  [facility] ([address])

## 2023-12-27 PROBLEM — M51.35 DDD (DEGENERATIVE DISC DISEASE), THORACOLUMBAR: Status: ACTIVE | Noted: 2023-12-27

## 2023-12-27 PROBLEM — M81.0 OSTEOPOROSIS: Status: ACTIVE | Noted: 2023-12-27

## 2023-12-27 LAB — BACTERIA UR CULT: ABNORMAL

## 2023-12-27 PROCEDURE — 97162 PT EVAL MOD COMPLEX 30 MIN: CPT

## 2023-12-27 PROCEDURE — 97166 OT EVAL MOD COMPLEX 45 MIN: CPT

## 2023-12-27 PROCEDURE — 25000003 PHARM REV CODE 250: Performed by: FAMILY MEDICINE

## 2023-12-27 PROCEDURE — 11000004 HC SNF PRIVATE

## 2023-12-27 PROCEDURE — 25000003 PHARM REV CODE 250: Performed by: HOSPITALIST

## 2023-12-27 PROCEDURE — 97530 THERAPEUTIC ACTIVITIES: CPT

## 2023-12-27 PROCEDURE — 63600175 PHARM REV CODE 636 W HCPCS: Performed by: FAMILY MEDICINE

## 2023-12-27 PROCEDURE — 97535 SELF CARE MNGMENT TRAINING: CPT

## 2023-12-27 RX ORDER — METHOCARBAMOL 500 MG/1
500 TABLET, FILM COATED ORAL EVERY 6 HOURS PRN
Status: DISCONTINUED | OUTPATIENT
Start: 2023-12-27 | End: 2024-01-10

## 2023-12-27 RX ORDER — METHOCARBAMOL 500 MG/1
500 TABLET, FILM COATED ORAL 4 TIMES DAILY
Status: DISCONTINUED | OUTPATIENT
Start: 2023-12-27 | End: 2023-12-27

## 2023-12-27 RX ORDER — ZINC SULFATE 50(220)MG
220 CAPSULE ORAL DAILY
Status: DISCONTINUED | OUTPATIENT
Start: 2023-12-28 | End: 2024-01-23 | Stop reason: HOSPADM

## 2023-12-27 RX ORDER — ENOXAPARIN SODIUM 100 MG/ML
30 INJECTION SUBCUTANEOUS EVERY 24 HOURS
Status: DISCONTINUED | OUTPATIENT
Start: 2023-12-27 | End: 2024-01-23 | Stop reason: HOSPADM

## 2023-12-27 RX ORDER — TALC
6 POWDER (GRAM) TOPICAL NIGHTLY
Status: DISCONTINUED | OUTPATIENT
Start: 2023-12-27 | End: 2024-01-23 | Stop reason: HOSPADM

## 2023-12-27 RX ORDER — IBUPROFEN 200 MG
400 TABLET ORAL EVERY 8 HOURS PRN
Status: DISPENSED | OUTPATIENT
Start: 2023-12-27 | End: 2023-12-30

## 2023-12-27 RX ORDER — ASCORBIC ACID 250 MG
500 TABLET ORAL 2 TIMES DAILY
Status: DISCONTINUED | OUTPATIENT
Start: 2023-12-27 | End: 2024-01-23 | Stop reason: HOSPADM

## 2023-12-27 RX ADMIN — AMLODIPINE BESYLATE 5 MG: 5 TABLET ORAL at 09:12

## 2023-12-27 RX ADMIN — FINASTERIDE 5 MG: 5 TABLET, FILM COATED ORAL at 09:12

## 2023-12-27 RX ADMIN — Medication 500 MG: at 09:12

## 2023-12-27 RX ADMIN — ACETAMINOPHEN 650 MG: 325 TABLET ORAL at 09:12

## 2023-12-27 RX ADMIN — METHOCARBAMOL 500 MG: 500 TABLET ORAL at 05:12

## 2023-12-27 RX ADMIN — ASPIRIN 81 MG CHEWABLE TABLET 81 MG: 81 TABLET CHEWABLE at 09:12

## 2023-12-27 RX ADMIN — METHOCARBAMOL 500 MG: 500 TABLET ORAL at 09:12

## 2023-12-27 RX ADMIN — ENOXAPARIN SODIUM 30 MG: 30 INJECTION SUBCUTANEOUS at 05:12

## 2023-12-27 RX ADMIN — POLYETHYLENE GLYCOL 3350 17 G: 17 POWDER, FOR SOLUTION ORAL at 09:12

## 2023-12-27 RX ADMIN — TAMSULOSIN HYDROCHLORIDE 0.4 MG: 0.4 CAPSULE ORAL at 09:12

## 2023-12-27 RX ADMIN — Medication 6 MG: at 09:12

## 2023-12-27 RX ADMIN — METHOCARBAMOL 500 MG: 500 TABLET ORAL at 02:12

## 2023-12-27 RX ADMIN — METHOCARBAMOL 500 MG: 500 TABLET ORAL at 11:12

## 2023-12-27 RX ADMIN — SENNOSIDES AND DOCUSATE SODIUM 1 TABLET: 8.6; 5 TABLET ORAL at 09:12

## 2023-12-27 NOTE — PLAN OF CARE
Continue regular diet, assist with set up, recommend boost plus BID, RD following  Goals: PO to meet 75% of EEN/EPN with ONS by next RD follow up  Nutrition Goal Status: new  Communication of RD Recs: other (comment) (POC)     Assessment and Plan at risk for or suspected malnutriton   Inadequate oral intake related to GI distress and weakness as evidenced by constipation, PO 50%     New     Plan   Collaboration with other providers  General diet  Commercial beverage( calories, protein) boost plus BID may sub boost glucose  Vitamin supplement therapy- Vit C  Mineral supplement therapy- zinc

## 2023-12-27 NOTE — CONSULTS
"  [facility] - Skilled Nursing  Adult Nutrition  Consult Note    SUMMARY   Recommendations  Continue regular diet, assist with set up, recommend boost plus BID, RD following  Goals: PO to meet 75% of EEN/EPN with ONS by next RD follow up  Nutrition Goal Status: new  Communication of RD Recs: other (comment) (POC)    Assessment and Plan at risk for or suspected malnutriton   Inadequate oral intake related to GI distress and weakness as evidenced by constipation, PO 50%    New    Plan   Collaboration with other providers  General diet  Commercial beverage( calories, protein) boost plus BID may sub boost glucose  Vitamin supplement therapy- Vit C  Mineral supplement therapy- zinc    Malnutrition Assessment pending                                      Dry skin, redness to sacrum  Reason for Assessment    Reason For Assessment: consult  Diagnosis:  (wekaness, constipation)  Relevant Medical History: HTN, deconditioning  Interdisciplinary Rounds: did not attend  General Information Comments: Does ;not want to move, eating in bed, PO 50%, assessment being completed remotely, NFPE scheduleed for RD followi- up  Nutrition Discharge Planning: DC on regula diet    Nutrition/Diet History    Patient Reported Diet/Restrictions/Preferences: general  Spiritual, Cultural Beliefs, Oriental orthodox Practices, Values that Affect Care: no  Food Allergies: NKFA  Factors Affecting Nutritional Intake: decreased appetite, constipation    Anthropometrics    Temp: 97.9 °F (36.6 °C)  Height Method: Stated  Height: 5' 11" (180.3 cm)  Height (inches): 71 in  Weight Method: Bed Scale  Weight: 58.8 kg (129 lb 10.1 oz)  Weight (lb): 129.63 lb  Ideal Body Weight (IBW), Male: 172 lb  % Ideal Body Weight, Male (lb): 75.37 %  BMI (Calculated): 18.1  BMI Grade: 18.5-24.9 - normal  Usual Body Weight (UBW), k.6 kg  % Usual Body Weight: 95.65  % Weight Change From Usual Weight: -4.55 %       Lab/Procedures/Meds    Pertinent Labs Reviewed: " reviewed  Pertinent Labs Comments: Hg 13.6, Hct 39.9,  Pertinent Medications Reviewed: reviewed  Pertinent Medications Comments: Vit C, ASA, zinc, senna-docusate, polyetylene glycol, Lovenox,    Estimated/Assessed Needs    Weight Used For Calorie Calculations: 61.6 kg (135 lb 12.9 oz) (UBW)  Energy Calorie Requirements (kcal): 5119-0144  Energy Need Method: Kcal/kg (25-30 kcal/kg for weight gain)  Protein Requirements: 80g  Weight Used For Protein Calculations: 61.6 kg (135 lb 12.9 oz) (UBW x 1.3)  Fluid Requirements (mL): 1540 or per MD  Estimated Fluid Requirement Method: RDA Method  RDA Method (mL): 1540  CHO Requirement: -      Nutrition Prescription Ordered    Current Diet Order: Regular  Nutrition Order Comments: PO 50%  Oral Nutrition Supplement: -    Evaluation of Received Nutrient/Fluid Intake    I/O: no data  Energy Calories Required: not meeting needs  Protein Required: not meeting needs  Fluid Required: meeting needs  Comments: LBM 12/24  Tolerance: tolerating  % Intake of Estimated Energy Needs: 25 - 50 %  % Meal Intake: 25 - 50 %    Nutrition Risk    Level of Risk/Frequency of Follow-up: high (two times per week)       Monitor and Evaluation    Food and Nutrient Intake: food and beverage intake  Food and Nutrient Adminstration: diet order  Physical Activity and Function: nutrition-related ADLs and IADLs  Anthropometric Measurements: weight change  Biochemical Data, Medical Tests and Procedures: electrolyte and renal panel, gastrointestinal profile  Nutrition-Focused Physical Findings: overall appearance       Nutrition Follow-Up    RD Follow-up?: Yes

## 2023-12-27 NOTE — PLAN OF CARE
Evaluation completed. POC established.     Problem: Physical Therapy  Goal: Physical Therapy Goal  Description: Goals to be met by: 24     Patient will increase functional independence with mobility by performin. Supine to sit with Supervision  2. Sit to supine with Supervision  3. Rolling to Left and Right with Supervision  4. Sit to stand transfer with Stand-by Assistance  5. Bed to chair transfer with Stand-by Assistance using Rolling Walker  6. Gait  x 150 feet with Stand-by Assistance using Rolling Walker  7. Wheelchair propulsion x 150 feet with Modified [place] using bilateral upper extremities  *Continue to assess living environment for steps to enter home; set goals as appropriate    Outcome: Ongoing, Progressing   2023

## 2023-12-27 NOTE — PT/OT/SLP EVAL
"Occupational Therapy   Evaluation and Treatment    Name: Dimitri Johnson  MRN: [id number]  Admit Date: 12/26/2023  Recent Surgeries: N/a     General Precautions: Standard, vision impaired, fall  Orthopedic Precautions:N/A   Braces: N/A    Recommendations:     Discharge Recommendations: home health OT  Level of Assistance Recommended: 24 hours significant assistance  Discharge Equipment Recommendations:  wheelchair, 3-in-1 commode  Barriers to discharge:  Other (Comment) (increased assistance required with ADLs and mobility)    Assessment:     Dimitri Johnson is a 89 y.o. male with a medical diagnosis of Weakness.  Pt tolerated session well and without incident, but he requires assistance with ADLs and mobility and is performing below his functional baseline.  Fear of falling affects his ability to perform daily tasks independently.  He would continue to benefit from OT services at [facility] to address the following deficits.  He presents with the following.  Performance deficits affecting function: weakness, impaired endurance, impaired self care skills, impaired functional mobility, gait instability, impaired balance, visual deficits, decreased safety awareness, decreased coordination.      Rehab Potential is good    Activity Tolerance: Fair    Plan:     Patient to be seen 5 x/week to address the above listed problems via self-care/home management, therapeutic activities, therapeutic exercises  Plan of Care Expires: 01/25/24  Plan of Care Reviewed with: patient    Subjective     Chief Complaint: "I'm going to fall. This is too complex for me to do with my medical condition."  Patient/Family Comments/goals: "Thank you."    Occupational Profile:  Living Environment: Pt lives with his son and daughter in a 2  with a stair lift to the landing and another to the door.  His bed and bathroom are on the 2nd floor, but he has a stair lift.  He has a claw tub and standard toilet.  Pt reports 2 recent "slide, not thump falls."  Previous " level of function: Independent with ADLs, sponge bathing at the sink and ambulating with  RW.  Decreased level of function recently.   Roles and Routines: father  Equipment Used at Home: walker, rolling, cane, quad (lift chair, stair lift)  Assistance upon Discharge: His son works as an , but his daughter is home.     Pain/Comfort:  Pain Rating 1: 0/10 (was in 7-8/10 RLE pain during 1st attempt at 0943)  Pain Addressed 1: Pre-medicate for activity  Pain Rating Post-Intervention 1: 0/10    Patients cultural, spiritual, Mu-ism conflicts given the current situation: no    Objective:     Communicated with: nurse prior to session.  Patient found HOB elevated with Other (comments) (no lines) upon OT entry to room.    Occupational Performance:     Bed Mobility:    Patient completed Rolling/Turning to Right with stand by assistance  Patient completed Scooting/Bridging with stand by assistance  Patient completed Supine to Sit with stand by assistance    Functional Mobility/Transfers:  Patient completed Sit <> Stand Transfer from EOB x 1 trial with minimum assistance  with  rolling walker   Patient completed Bed <> Chair Transfer using Stand Pivot technique with minimum assistance with rolling walker, requiring maximal cueing for encouragement and to reassure he was safe    Activities of Daily Livin23 1724   Prior Functioning: Everyday Activities   Self Care Independent   Indoor Mobility (Ambulation) Independent   Stairs Not applicable  (pt reports having chair lift for steps in home and outside home)   Functional Cognition Independent   Prior Device Use Walker   Functional Limitation in Range of Motion   Upper Extremity No impairment   Lower Extremity No impairment   Mobility Devices Walker;Wheelchair (manual or electric)   Eating   Was the activity attempted? No   Was the activity done independently? No  (setup assistance of tray table while HOB elevated)   Reason if not Attempted   (pt ate prior to  session)   CARE Score - Eating -   Eating Discharge Goal   Discharge Goal 9   Oral Hygiene   Was the activity attempted? Yes   Was the activity done independently? No   Assistance Needed Touching assistance;Verbal cues   Physical Assistance Level Less than half  (SBA to manage sink and with cueing to sequence and to verbally confirm pt was performing task correctly when he asked while seated at sink)   CARE Score - Oral Hygiene 3   Oral Hygiene Discharge Goal   Discharge Goal 9   Toileting Hygiene   Was the activity attempted? Yes   Was the activity done independently? No   Assistance Needed Physical assistance   Physical Assistance Level Less than half  (Min A to doff damp brief while pt held urinal and unsuccessfully attempted to urinate seated EOB due to strong urge.  Max A to naseem clean brief EOB.  He did not need to have a BM.)   Was adaptive equipment used? Yes  (RW)   CARE Score - Toileting Hygiene 3   Toileting Hygiene Discharge Goal   Discharge Goal 4   Shower/Bathe Self   Was the activity attempted? Yes   Was the activity done independently? No   Assistance Needed Touching assistance;Verbal cues  (SBA to wash his upper body while seated at sink with cues for sequencing)   Was adaptive equipment used? Yes  (w/c)   CARE Score - Shower/Bathe Self 4   Shower/Bathe Self Discharge Goal   Discharge Goal 9   Upper Body Dressing   Was the activity attempted? Yes   Was the activity done independently? No   Assistance Needed Supervision;Verbal cues   Physical Assistance Level Less than half  (SBA to doff hospital gown and to naseem pullover shirt while seated in w/c)   CARE Score - Upper Body Dressing 3   Upper Body Dressing Discharge Goal   Discharge Goal 9   Lower Body Dressing   Was the activity attempted? Yes   Was the activity done independently? No   Assistance Needed Physical assistance   Physical Assistance Level More than half  (Max A to naseem pants while EOB)   CARE Score - Lower Body Dressing 2   Lower Body  Dressing Discharge Goal   Discharge Goal 9   Putting On/Taking Off Footwear   Was the activity attempted? Yes   Was the activity done independently? No   Assistance Needed Physical assistance   Physical Assistance Level Less than half  (SBA to doff non-skid socks while seated in chair; Min A to naseem socks with (A) to place over his toes)   CARE Score - Putting On/Taking Off Footwear 3   Putting On/Taking Off Footwear Discharge Goal   Discharge Goal 9   Personal Hygiene   Was the activity attempted? Yes   Was the activity done independently? No   Assistance Needed Verbal cues;Touching assistance   Physical Assistance Level Less than half  (SBA to wash his face and comb his hair while seated at sink)   CARE Score - Personal Hygiene 3   Toilet Transfer   Was the activity attempted? No   Reason if not Attempted Refused to perform   CARE Score - Toilet Transfer 7   Toilet Transfer Discharge Goal   Discharge Goal 4   Tub/Shower Transfer   Was the activity attempted? 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    Physical Exam:  Dominant hand:    -       R-handed  Upper Extremity Range of Motion:     -       Right Upper Extremity: WFL  -       Left Upper Extremity: WFL  Upper Extremity Strength:    -       Right Upper Extremity: WFL  -       Left Upper Extremity: WFL   Strength:    -       Right Upper Extremity: WFL  -       Left Upper Extremity: WFL  Fine Motor Coordination:    -       Intact    AMPAC 6 Click ADL:  AMPAC Total Score: 17    Treatment & Education:  Pt edu on role of OT, POC, safety when performing self care tasks, benefit of performing OOB activity, and safety when performing functional  transfers and mobility.  - White board updated  - Self care tasks completed-- as noted above      Patient left up in chair with call button in reach    GOALS:   Multidisciplinary Problems       Occupational Therapy Goals          Problem: Occupational Therapy    Goal Priority Disciplines Outcome Interventions   Occupational Therapy Goal     OT, PT/OT Ongoing, Progressing    Description: Goals to be met by: 1/17/2024     Patient will increase functional independence with ADLs by performing:    UE Dressing with Modified [place].  LE Dressing with Stand-by Assistance.  Grooming while seated at sink with Modified [place].  Toileting from toilet with Stand-by Assistance for hygiene and clothing management.   Bathing from sitting/standing at sink with Stand-by Assistance.  Supine to sit with Modified [place].  Step transfer with Stand-by Assistance with RW.  Toilet transfer to toilet with Stand-by Assistance with RW.                         History:     Past Medical History:   Diagnosis Date    Glaucoma     Hypertension          Past Surgical History:   Procedure Laterality Date    CORNEAL TRANSPLANT Right     CORNEAL TRANSPLANT Left 7/17/14    [facility]       Time Tracking:     OT Date of Treatment: 12/27/23  OT Start Time: 1318  OT Stop Time: 1400  OT Total Time (min): 42 min    Billable Minutes:Evaluation 10 min  Self Care/Home Management 32 min    12/27/2023

## 2023-12-27 NOTE — PT/OT/SLP EVAL
"Physical Therapy Evaluation/Treatment Note    Patient Name:  Dimitri Johnson   MRN:  [id number]  Admit Date: 12/26/2023  Admitting Diagnosis: weakness  Recent Surgeries: N/A    General Precautions: Standard, fall, vision impaired   Orthopedic Precautions: N/A   Braces: N/A    Recommendations:     Discharge Recommendations: home health PT   Level of Assistance Recommended: 24 hours significant assistance  Discharge Equipment Recommendations: wheelchair, 3-in-1 commode   Barriers to discharge: Other (Comment) (Increased skilled assistance required for mobility)    Assessment:     Dimitri Johnson is a 89 y.o. male admitted with a medical diagnosis of Weakness. Performance deficits affecting function weakness, impaired endurance, impaired self care skills, impaired functional mobility, gait instability, impaired balance, visual deficits, decreased coordination, decreased safety awareness. Patient agreeable to PT evaluation and treatment this PM. Patient reports being ambulatory using RW around home prior to recent decline in functional mobility. Patient currently functioning below baseline level of mobility requiring Min/ModA for functional tasks. Patient with apprehension regarding fear of falling throughout session. Increased verbal cues provided to ensure patient of his safety with therapy. Patient will benefit from continued SNF rehabilitation services to address above mentioned deficits as well as progress mobility towards PLOF and increased functional independence for safe transition to home environment at time of discharge.     Rehab Potential is good     Activity Tolerance: Fair    Plan:     Patient to be seen 5 x/week to address the above listed problems via gait training, therapeutic activities, therapeutic exercises, neuromuscular re-education, wheelchair management/training     Plan of Care Expires: 01/26/24  Plan of Care Reviewed with: patient, daughter    Subjective     Chief Complaint: "I very am insecure." "   Patient/Family Comments/goals: Return home at [facility]  Pain/Comfort:  Pain Rating 1: 0/10  Pain Rating Post-Intervention 1: 0/10    Living Environment:   Patient resides home with daughter and son in 2SH. Patient resides on 2nd floor of home with a claw foot tub in bathroom. Patient accesses 2nd floor via stair lift (has to transfer from one stair lift chair to the second chair with use of RW at the level of the landing).    Prior to admission, patients level of function was Mod I using RW with reports of recent decline in functional mobility. Patient was taking sponge baths.  Equipment used at home: RW, stair lift .  DME owned (not currently used): none.  Upon discharge, patient will have assistance from family.    Patients cultural, spiritual, Buddhism conflicts given the current situation: no    Objective:     Communicated with nursing staff prior to session.  Patient found up in chair with  (no active lines)  upon PT entry to room. Daughter initially present in room.     Exams:  Cognitive Exam:  Patient is oriented to Person, Place, Time, and Situation  RLE ROM: WFL  RLE Strength: WFL  LLE ROM: WFL  LLE Strength: WFL    Functional Mobility:     12/27/23 1410   Prior Functioning: Everyday Activities   Self Care Independent   Indoor Mobility (Ambulation) Independent   Stairs Not applicable  (Patient reports having chair lift for steps within home)   Functional Cognition Independent   Prior Device Use Walker   Functional Limitation in Range of Motion   Lower Extremity No impairment   Mobility Devices Walker;Wheelchair (manual or electric)   Roll Left and Right   Was the activity attempted? Yes   Was the activity done independently? No   Assistance Needed Physical assistance;Verbal cues  (Tata on level mat without railings)   Physical Assistance Level Less than half   Was adaptive equipment used? No   CARE Score - Roll Left and Right 3   Sit to Lying   Was the activity attempted? Yes   Was the activity done  independently? No   Assistance Needed Physical assistance;Verbal cues  (Tata on level mat without railings)   Physical Assistance Level Less than half   Was adaptive equipment used? No   CARE Score - Sit to Lying 3   Lying to Sitting on Side of Bed   Was the activity attempted? Yes   Was the activity done independently? No   Assistance Needed Physical assistance;Verbal cues  (Tata on level mat without railings)   Physical Assistance Level Less than half   Was adaptive equipment used? No   CARE Score - Lying to Sitting on Side of Bed 3   Sit to Stand   Was the activity attempted? Yes   Was the activity done independently? No   Assistance Needed Physical assistance;Verbal cues  (Min/ModA using RW; repeated throughout session. Verbal and tactile cues for hand placement. Cues for sequencing of steps.)   Physical Assistance Level Less than half   Was adaptive equipment used? Yes   CARE Score - Sit to Stand 3   Sit to Stand Discharge Goal   Discharge Goal 4   Chair/Bed-to-Chair Transfer   Was the activity attempted? Yes   Was the activity done independently? No   Assistance Needed Physical assistance;Verbal cues  (Tata using RW via stand step; verbal cues for stepping sequence)   Physical Assistance Level Less than half   Was adaptive equipment used? Yes   CARE Score - Chair/Bed-to-Chair Transfer 3   Car Transfer   Was the activity attempted? No   Reason if not Attempted Environmental limitations   CARE Score - Car Transfer 10   Walk 10 Feet   Was the activity attempted? Yes   Was the activity done independently? No   Assistance Needed Physical assistance;Verbal cues  (Patient ambulated 60 feet using RW with [place]. Cues for sequencing of RW with steps. Patient apprehensive about mobility. No LOB. W/C in tow.)   Physical Assistance Level Less than half   Was adaptive equipment used? Yes   CARE Score - Walk 10 Feet 3   Walk 50 Feet with Two Turns   Was the activity attempted? Yes   Was the activity done independently? No    Assistance Needed Physical assistance;Verbal cues  (Patient ambulated 60 feet using RW with [place]. Cues for sequencing of RW with steps. Patient apprehensive about mobility. No LOB. W/C in tow.)   Physical Assistance Level Less than half   Was adaptive equipment used? Yes   CARE Score - Walk 50 Feet with Two Turns 3   Walk 150 Feet   Was the activity attempted? No   Reason if not Attempted Safety concerns   CARE Score - Walk 150 Feet 88   Walking 10 Feet on Uneven Surfaces   Was the activity attempted? No   Reason if not Attempted Safety concerns   CARE Score - Walking 10 Feet on Uneven Surfaces 88   1 Step (Curb)   Was the activity attempted? No   Reason if not Attempted Safety concerns   CARE Score - 1 Step (Curb) 88   4 Steps   Was the activity attempted? No   Reason if not Attempted Safety concerns   CARE Score - 4 Steps 88   12 Steps   Was the activity attempted? No   Reason if not Attempted Safety concerns   CARE Score - 12 Steps 88   Picking Up Object   Was the activity attempted? No   Reason if not Attempted Safety concerns   CARE Score - Picking Up Object 88   OTHER   Uses a Wheelchair/Scooter? Yes   Wheel 50 Feet with Two Turns   Was the activity attempted? Yes   Was the activity done independently? No   Assistance Needed Supervision;Verbal cues  (Patient propelled W/C 115 feet using BUE with verbal cues for steering and direction. Veering to L throughout trial.)   Type of Wheelchair/Scooter Manual   CARE Score - Wheel 50 Feet with Two Turns 4   Wheel 150 Feet   Was the activity attempted? No   Reason if not Attempted Safety concerns   CARE Score - Wheel 150 Feet 88     Education:  Patient provided with daily orientation and goals of this PT session.  Patient educated to transfer to bedside chair/bedside commode/bathroom with RN/PCT present.   Patient educated on Fall risk, home safety, Home exercise program, plan of care, and transfer training by explanation and demonstration.   Patient Verbalized  Understanding.  Time provided for therapeutic counseling and discussion of current health disposition. All questions answered to satisfaction, within scope of PT practice; voiced no other concerns. White board updated in patient's room, nursing staff notified of session.      Therapeutic Activities and Exercises:   Repeated functional transfers performed throughout session. See above chart for details. Increased time spent to encourage patient and ensure his safety with mobility during treatment.     AM-PAC 6 CLICK MOBILITY  Total Score:15     Patient left up in chair with call button in reach and PCT and nurse notified.    GOALS:   Multidisciplinary Problems       Physical Therapy Goals          Problem: Physical Therapy    Goal Priority Disciplines Outcome Goal Variances Interventions   Physical Therapy Goal     PT, PT/OT Ongoing, Progressing     Description: Goals to be met by: 24     Patient will increase functional independence with mobility by performin. Supine to sit with Supervision  2. Sit to supine with Supervision  3. Rolling to Left and Right with Supervision  4. Sit to stand transfer with Stand-by Assistance  5. Bed to chair transfer with Stand-by Assistance using Rolling Walker  6. Gait  x 150 feet with Stand-by Assistance using Rolling Walker  7. Wheelchair propulsion x 150 feet with Modified [place] using bilateral upper extremities  *Continue to assess living environment for steps to enter home; set goals as appropriate                         History:     Past Medical History:   Diagnosis Date    Glaucoma     Hypertension        Past Surgical History:   Procedure Laterality Date    CORNEAL TRANSPLANT Right     CORNEAL TRANSPLANT Left 14    [facility]       Time Tracking:     PT Received On: 23  PT Start Time: 1410     PT Stop Time: 1458  PT Total Time (min): 48 min     Billable Minutes: Evaluation 20 and Therapeutic Activity 28      2023

## 2023-12-27 NOTE — PROGRESS NOTES
[facility]                                  Skilled Nursing Facility                   Progress Note     Patient Name: Dimitri Johnson  YOB: 1933  MRN: [id number]  Room: ITKV127/XQMG294 A     Admit Date: 12/26/2023   PALOMO:      Principal Problem:  Weakness    HPI obtained from patient interview and chart review     Chief Complaint:   Establish Care/ Initial Visit    HPI:   Dimitri Johnson is a 89 y.o. male admitted to [facility] 12/22 with weakness and constipation. He had associated stomach discomfort that was relieved after having a large bowel movement. Daily miralax and prn dulcolax regimen started. Patient has decreased mobility due to his degenerative disc disease and deconditioning. He is hesitant to walk and stand up. He worked with PT and OT who both recommended moderate intensity therapy.     Patient will be treated at [facility] with PT and OT to improve functional status and ability to perform ADLs.     He is sitting up in bed with complaint of RLE tightness different from usual muscle spasm.  No edema or redness noted.  Robaxin 4 times daily ordered for muscle spasms, BLE venous Doppler to rule out DVT.  He states he is chair bound at baseline and has a lift chair for his two-story house where he lives with his children.  Hospital records indicate that he does ambulate with walker at home. He is blind in the right eye and poor vision in the left, hard of hearing.    Past Medical History: Patient has a past medical history of Glaucoma and Hypertension. 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    Social History: Patient reports that he has never smoked. He does not have any smokeless tobacco history on file. He reports current alcohol use.    Family  History:  non contributory    Allergies: Patient is allergic to pcn [penicillins].        ROS  Constitutional:  Negative for fever.   HENT:  Negative for sore throat.    Eyes:  Negative for redness.   Respiratory:  Negative for shortness of breath.    Cardiovascular:  Negative for chest pain.   Gastrointestinal:  Negative for nausea.   Genitourinary:  Negative for dysuria.   Musculoskeletal:  Negative for back pain.  Positive RLE muscle spasm  Skin:  Negative for rash.   Neurological:  Positive for weakness.   Hematological:  Does not bruise/bleed easily.   Psychiatric/Behavioral: Negative insomnia      24 hour Vital Sign Range   Temp:  [97.9 °F (36.6 °C)-98.3 °F (36.8 °C)]   Pulse:  [69-89]   Resp:  [16-20]   BP: (124-135)/(61-66)   SpO2:  [97 %-98 %]       Physical Exam  General: Alert, frail, oriented x4  HENT: Atraumatic, normocephalic, Right eye keteroplasty/blind, left eye poor vision, hard of hearing  CV: RRR; no murmurs, Normal s1, s2, no peripheral edema  Resp: CTAB with normal work of breathing and chest excursion on room air.   Abd: Soft, NTND. Bowel sounds normoactive  MSK: +2 radial pulses b/l. No edema, cyanosis, or erythema noted on extremities. Generalized weakness  Neuro: Normal appearing coordination and sensory function.   Skin: No rashes or lesions noted, dry, warm, dressing to left ankle,  non blanchable redness sacrum  Psych: calm, cooperative            Labs:  Recent Labs   Lab 12/24/23  0526 12/25/23  0615 12/26/23  0354   WBC 8.71 10.22 12.00   HGB 13.9* 13.1* 13.6*   HCT 41.0 38.2* 39.9*    306 303     Recent Labs   Lab 12/22/23  1843 12/23/23  0509 12/24/23  0526 12/25/23  0615 12/26/23  0354   * 136 137 136 136   K 4.6 4.0 3.9 4.1 4.3   CL 95 103 106 106 107   CO2 18* 23 21* 22* 21*   BUN 12 11 15 17 23   CREATININE 1.3 1.2 1.2 1.2 1.0    92 93 102 101   CALCIUM 9.3 9.3 8.8 8.6* 9.0   MG  --  2.2 2.1 2.0  --    PHOS  --  3.3 4.5 3.8  --    LIPASE 59  --   --   --   --   "    Recent Labs   Lab 12/23/23  0509 12/24/23  0526 12/25/23  0615   ALKPHOS 79 73 67   ALT 19 16 16   AST 29 24 22   ALBUMIN 3.6 3.2* 3.1*   PROT 6.5 5.9* 5.8*   BILITOT 0.9 0.7 0.6     No results for input(s): "POCTGLUCOSE" in the last 72 hours.    Meds Scheduled:   amLODIPine  5 mg Oral BID    aspirin  81 mg Oral Daily    finasteride  5 mg Oral Daily    methocarbamoL  500 mg Oral QID    polyethylene glycol  17 g Oral Daily    senna-docusate 8.6-50 mg  1 tablet Oral BID    tamsulosin  0.4 mg Oral Daily       PRN:   acetaminophen, acetaminophen, bisacodyL, calcium carbonate, ibuprofen, melatonin      Assessment and Plan:    Constipation  CT- Limited examination. Moderately large liquid stool within the patulous rectum.  Question possible diarrheal illness and/or fecal impaction.  Left renal cyst.     Prostatomegaly.  Osseous changes particularly of the spine.  Miralax daily, Dulcolax daily  Encourge oral intake  Avoid narcotics    Degenerative disc disease  Osteoporosis  Muscle spasms   Initiate order for Robaxin QID PRN, ibuprofen p.r.n. Q 8 H x3 days   Labs reviewed electrolytes WNL    Right lower extremity Baker's cyst, new  Noted on venous ultrasound  Continue ibuprofen prn pain, ice  Ambulatory referral to orthopedics     Urinary retention  Ramirez placed  in ED for retention, discontinued prior to discharge.   Bladder scan q.shift x 3days   12/26 Urine culture in process   Initiate vitamin C     Weakness  Debility  Patient uses walker at baseline. His son reported  prior to admit he was unable to get off of toilet and ambulate.    B12 WNL TSH 8.7 but T4 WNL   PT/OT consult   Fall precaution  Lovenox 30mg daily for VTE ppx    Sacral redness   Triad BID   Consult wound care   Vitamin-C and zinc     Primary hypertension   Continue Norvasc    Anticipate disposition:    Home with home health      Follow-up needed during SNF admission:   None    Follow-up needed after discharge from SNF:   - PCP within 1-2 weeks  - " See appt scheduled below     Future Appointments   Date Time Provider Department Center   12/27/2023  5:30 PM [facility] PORT1 [facility]         I certify that SNF services are required to be given on an inpatient basis because Dimitri Johnson needs for skilled nursing care and/or skilled rehabilitation are required on a daily basis and such services can only practically be provided in a skilled nursing facility setting and are for an ongoing condition for which she received inpatient care in the hospital.       Extended Visit:   Total time spent: 116 minutes  Non Face to Face Time: 86 minutes   Description of Time: counseling provided on clinical condition, therapies provided, plan of care, emotional support, coordinating patient care with other care team members, reviewing and interpreting labs and imaging, collaboration with physician, initiating new orders, chart review, and documentation. See interval hx.         SADIE GÓMEZ  Department of Hospital Medicine   [facility]     DOS: 12/27/2023       Patient note was created using MModal Dictation.  Any errors in syntax or even information may not have been identified and edited on initial review prior to signing this note.

## 2023-12-27 NOTE — PLAN OF CARE
Problem: Occupational Therapy  Goal: Occupational Therapy Goal  Description: Goals to be met by: 1/17/2024     Patient will increase functional independence with ADLs by performing:    UE Dressing with Modified [place].  LE Dressing with Stand-by Assistance.  Grooming while seated at sink with Modified [place].  Toileting from toilet with Stand-by Assistance for hygiene and clothing management.   Bathing from sitting/standing at sink with Stand-by Assistance.  Supine to sit with Modified [place].  Step transfer with Stand-by Assistance with RW.  Toilet transfer to toilet with Stand-by Assistance with RW.    Outcome: Ongoing, Progressing     Pt evaluated and OT goals established.

## 2023-12-27 NOTE — NURSING
Pt arrived to floor for skilled services with admitting diagnosis of Constipation, unspecified via wheelchair. Pt required 1 assistance from wheelchair to bed. Pt is AAOx3, disoriented to situation, incontinent)of B/B. Pt is on a Regular diet. Skin assessment done: Left arm with redness near removed IV site, Lower legs and feet with dry flaky skin, sacrum nonblanchable redness noted. Son Tanner informed of arrival. POC reviewed with patient. Pt denies pain at this time and is educated to use call light and not get OOB w/o assistance

## 2023-12-28 LAB
ANION GAP SERPL CALC-SCNC: 7 MMOL/L (ref 8–16)
BASOPHILS # BLD AUTO: 0.05 K/UL (ref 0–0.2)
BASOPHILS NFR BLD: 0.4 % (ref 0–1.9)
BUN SERPL-MCNC: 28 MG/DL (ref 8–23)
CALCIUM SERPL-MCNC: 8.4 MG/DL (ref 8.7–10.5)
CHLORIDE SERPL-SCNC: 105 MMOL/L (ref 95–110)
CO2 SERPL-SCNC: 23 MMOL/L (ref 23–29)
CREAT SERPL-MCNC: 1.1 MG/DL (ref 0.5–1.4)
DIFFERENTIAL METHOD BLD: ABNORMAL
EOSINOPHIL # BLD AUTO: 0.2 K/UL (ref 0–0.5)
EOSINOPHIL NFR BLD: 1.7 % (ref 0–8)
ERYTHROCYTE [DISTWIDTH] IN BLOOD BY AUTOMATED COUNT: 13.2 % (ref 11.5–14.5)
EST. GFR  (NO RACE VARIABLE): >60 ML/MIN/1.73 M^2
GLUCOSE SERPL-MCNC: 99 MG/DL (ref 70–110)
HCT VFR BLD AUTO: 40 % (ref 40–54)
HGB BLD-MCNC: 13.9 G/DL (ref 14–18)
IMM GRANULOCYTES # BLD AUTO: 0.04 K/UL (ref 0–0.04)
IMM GRANULOCYTES NFR BLD AUTO: 0.4 % (ref 0–0.5)
LYMPHOCYTES # BLD AUTO: 1.8 K/UL (ref 1–4.8)
LYMPHOCYTES NFR BLD: 15.6 % (ref 18–48)
MAGNESIUM SERPL-MCNC: 2 MG/DL (ref 1.6–2.6)
MCH RBC QN AUTO: 32.7 PG (ref 27–31)
MCHC RBC AUTO-ENTMCNC: 34.8 G/DL (ref 32–36)
MCV RBC AUTO: 94 FL (ref 82–98)
MONOCYTES # BLD AUTO: 1 K/UL (ref 0.3–1)
MONOCYTES NFR BLD: 8.9 % (ref 4–15)
NEUTROPHILS # BLD AUTO: 8.2 K/UL (ref 1.8–7.7)
NEUTROPHILS NFR BLD: 73 % (ref 38–73)
NRBC BLD-RTO: 0 /100 WBC
PHOSPHATE SERPL-MCNC: 3.4 MG/DL (ref 2.7–4.5)
PLATELET # BLD AUTO: 325 K/UL (ref 150–450)
PMV BLD AUTO: 10.9 FL (ref 9.2–12.9)
POTASSIUM SERPL-SCNC: 4.5 MMOL/L (ref 3.5–5.1)
RBC # BLD AUTO: 4.25 M/UL (ref 4.6–6.2)
SODIUM SERPL-SCNC: 135 MMOL/L (ref 136–145)
WBC # BLD AUTO: 11.27 K/UL (ref 3.9–12.7)

## 2023-12-28 PROCEDURE — 25000003 PHARM REV CODE 250: Performed by: FAMILY MEDICINE

## 2023-12-28 PROCEDURE — 11000004 HC SNF PRIVATE

## 2023-12-28 PROCEDURE — 97530 THERAPEUTIC ACTIVITIES: CPT

## 2023-12-28 PROCEDURE — 63600175 PHARM REV CODE 636 W HCPCS: Performed by: FAMILY MEDICINE

## 2023-12-28 PROCEDURE — 36415 COLL VENOUS BLD VENIPUNCTURE: CPT | Performed by: HOSPITALIST

## 2023-12-28 PROCEDURE — 80048 BASIC METABOLIC PNL TOTAL CA: CPT | Performed by: HOSPITALIST

## 2023-12-28 PROCEDURE — 83735 ASSAY OF MAGNESIUM: CPT | Performed by: HOSPITALIST

## 2023-12-28 PROCEDURE — 97116 GAIT TRAINING THERAPY: CPT

## 2023-12-28 PROCEDURE — 85025 COMPLETE CBC W/AUTO DIFF WBC: CPT | Performed by: HOSPITALIST

## 2023-12-28 PROCEDURE — 97535 SELF CARE MNGMENT TRAINING: CPT

## 2023-12-28 PROCEDURE — 84100 ASSAY OF PHOSPHORUS: CPT | Performed by: HOSPITALIST

## 2023-12-28 PROCEDURE — 25000003 PHARM REV CODE 250: Performed by: HOSPITALIST

## 2023-12-28 PROCEDURE — 97110 THERAPEUTIC EXERCISES: CPT

## 2023-12-28 RX ORDER — AMLODIPINE BESYLATE 2.5 MG/1
2.5 TABLET ORAL DAILY
Status: DISCONTINUED | OUTPATIENT
Start: 2023-12-29 | End: 2024-01-09

## 2023-12-28 RX ORDER — AMLODIPINE BESYLATE 2.5 MG/1
2.5 TABLET ORAL 2 TIMES DAILY
Status: DISCONTINUED | OUTPATIENT
Start: 2023-12-28 | End: 2023-12-28

## 2023-12-28 RX ORDER — FERROUS SULFATE, DRIED 160(50) MG
2 TABLET, EXTENDED RELEASE ORAL DAILY
Status: DISCONTINUED | OUTPATIENT
Start: 2023-12-29 | End: 2024-01-23 | Stop reason: HOSPADM

## 2023-12-28 RX ORDER — MIRTAZAPINE 7.5 MG/1
7.5 TABLET, FILM COATED ORAL NIGHTLY
Status: DISCONTINUED | OUTPATIENT
Start: 2023-12-28 | End: 2024-01-23 | Stop reason: HOSPADM

## 2023-12-28 RX ADMIN — Medication 6 MG: at 09:12

## 2023-12-28 RX ADMIN — ASPIRIN 81 MG CHEWABLE TABLET 81 MG: 81 TABLET CHEWABLE at 09:12

## 2023-12-28 RX ADMIN — SENNOSIDES AND DOCUSATE SODIUM 1 TABLET: 8.6; 5 TABLET ORAL at 09:12

## 2023-12-28 RX ADMIN — TAMSULOSIN HYDROCHLORIDE 0.4 MG: 0.4 CAPSULE ORAL at 09:12

## 2023-12-28 RX ADMIN — MIRTAZAPINE 7.5 MG: 7.5 TABLET, FILM COATED ORAL at 09:12

## 2023-12-28 RX ADMIN — ENOXAPARIN SODIUM 30 MG: 30 INJECTION SUBCUTANEOUS at 06:12

## 2023-12-28 RX ADMIN — ZINC SULFATE 220 MG (50 MG) CAPSULE 220 MG: CAPSULE at 09:12

## 2023-12-28 RX ADMIN — AMLODIPINE BESYLATE 5 MG: 5 TABLET ORAL at 09:12

## 2023-12-28 RX ADMIN — Medication 500 MG: at 09:12

## 2023-12-28 RX ADMIN — POLYETHYLENE GLYCOL 3350 17 G: 17 POWDER, FOR SOLUTION ORAL at 09:12

## 2023-12-28 RX ADMIN — METHOCARBAMOL 500 MG: 500 TABLET ORAL at 09:12

## 2023-12-28 RX ADMIN — FINASTERIDE 5 MG: 5 TABLET, FILM COATED ORAL at 09:12

## 2023-12-28 NOTE — PT/OT/SLP PROGRESS
Occupational Therapy   Treatment    Name: Dimitri Johnson  MRN: [id number]  Admit Date: 12/26/2023  Admitting Diagnosis:  Weakness    General Precautions: Standard, fall, vision impaired   Orthopedic Precautions: N/A   Braces: N/A    Recommendations:     Discharge Recommendations:  home health OT  Level of Assistance Recommended at Discharge: 24 hours physical assistance for all ADL's and home management tasks  Discharge Equipment Recommendations: wheelchair, 3-in-1 commode  Barriers to discharge:   increased A needed for mobility    Assessment:     Dimitri Johnson is a 89 y.o. male with a medical diagnosis of Weakness  He presents with performance deficits affecting function are weakness, impaired endurance, impaired self care skills, impaired functional mobility, gait instability, decreased lower extremity function, decreased safety awareness, impaired cognition, impaired coordination, decreased ROM, impaired skin.  Pt completed functional mobility and ADLs (while seated in his wheelchair) with significant assistance due to anxiety re: mobility and memory deficits. Pt benefits from verbal cues for sequencing both transfers and ADLs. Despite multiple prompts and education, pt politely declined to transfer to upright chair or engage in further transfers.     Rehab Potential is good    Activity tolerance:  Good    Plan:     Patient to be seen 5 x/week to address the above listed problems via self-care/home management, therapeutic activities, therapeutic exercises    Plan of Care Expires: 01/25/24  Plan of Care Reviewed with: patient    Subjective     Communicated with: RN prior to session .    Pain/Comfort:  Pain Rating 1: 0/10  Pain Addressed 1: Pre-medicate for activity  Pain Rating Post-Intervention 1: 0/10    Patient's cultural, spiritual, Congregational conflicts given the current situation:  no    Objective:     Patient found up in chair with  (no lines) upon OT entry to room.    Bed Mobility:    Patient completed  Rolling/Turning to Left with  stand by assistance  Patient completed Rolling/Turning to Right with stand by assistance  Patient completed Scooting/Bridging with contact guard assistance  Patient completed Supine to Sit with stand by assistance and multiple verbal/tactile cues for sequencing and to soothe anxiety      Functional Mobility/Transfers:  Patient completed Sit <> Stand Transfer with minimum assistance  with  hand-held assist   Patient completed Bed <> Chair Transfer using Step Transfer technique with minimum assistance with hand-held assist with multiple multisensory cues for sequencing due to anxiety  Functional Mobility: Pt able to propel self in wc from EOB to bathroom threshold    Activities of Daily Living:  Grooming: minimum assistance washing face and neck, using mouthwash, applying deodorant, combing hair with multiple cues throughout for sequencing  Upper Body Dressing: maximal assistance doffing soiled shirt and donning fresh shirt  Lower Body Dressing: maximal assistance threading pants onto legs while pt sitting EOB, pulling up pants at waist when pt in standing  Toileting: stand by assistance and multiple cues throughout session to soothe pt's anxiety re: urinary urge    AMPAC 6 Click ADL: 17      Treatment & Education:  Pt educated on role of OT, POC, and goals for therapy.    POC was dicussed with patient/caregiver, who was included in its development and is in agreement with the identified goals and treatment plan.   Patient and family aware of patient's deficits and therapy progression.   Time provided for therapeutic counseling and discussion of health disposition.   Educated on importance of EOB/OOB mobility, maintaining routine, sitting up in chair, and maximizing independence with ADLs during admission   Pt completed ADLs and functional mobility for treatment session as noted above   Pt/caregiver verbalized understanding and expressed no further concerns/questions.  Updated  communication board with level of assist required       Patient left up in chair with call button in reach    GOALS:   Multidisciplinary Problems       Occupational Therapy Goals          Problem: Occupational Therapy    Goal Priority Disciplines Outcome Interventions   Occupational Therapy Goal     OT, PT/OT Ongoing, Progressing    Description: Goals to be met by: 1/17/2024     Patient will increase functional independence with ADLs by performing:    UE Dressing with Modified [place].  LE Dressing with Stand-by Assistance.  Grooming while seated at sink with Modified [place].  Toileting from toilet with Stand-by Assistance for hygiene and clothing management.   Bathing from sitting/standing at sink with Stand-by Assistance.  Supine to sit with Modified [place].  Step transfer with Stand-by Assistance with RW.  Toilet transfer to toilet with Stand-by Assistance with RW.                         Time Tracking:     OT Date of Treatment: 12/28/23  OT Start Time: 0857    OT Stop Time: 0925  OT Total Time (min): 28 min    Billable Minutes:Self Care/Home Management 15  Therapeutic Activity 13    12/28/2023

## 2023-12-28 NOTE — PT/OT/SLP PROGRESS
Physical Therapy Treatment    Patient Name:  Dimitri Johnson   MRN:  [id number]  Admit Date: 12/26/2023  Admitting Diagnosis: Weakness  Recent Surgeries: N/A    General Precautions: Standard, fall, vision impaired  Orthopedic Precautions: N/A  Braces: N/A    Recommendations:     Discharge Recommendations: home health PT  Level of Assistance Recommended at Discharge: 24 hours significant assistance  Discharge Equipment Recommendations: wheelchair, 3-in-1 commode  Barriers to discharge: Other (Comment) (Increased assistance for mobility)    Assessment:     Dimitri Johnson is a 89 y.o. male admitted with a medical diagnosis of Weakness. Patient agreeable with heistancy to PT treatment this AM. Patient remains with apprehension regarding standing activity with fear of falling. Patient perseverating on urge to urinate, need to belch and feeling insecure throughout entire session resulting in interference with performance of mobility tasks. Increased time spent to ensure patient is comfortable with therapy treatment and that current needs are being addressed. Nurse aware. Patient ambulated an additional trial this session with repeated verbal cues for direction change and tolerated seated BLE exercises without adverse reaction. Patient will benefit from continued SNF rehabilitation services to address deficits as well as progress mobility towards maximal functional potential for improved quality of life and decreased caregiver burden.    Performance deficits affecting function: weakness, impaired endurance, impaired self care skills, impaired functional mobility, gait instability, impaired balance, decreased lower extremity function, visual deficits, decreased safety awareness, decreased coordination.    Rehab Potential is good    Activity Tolerance: Fair    Plan:     Patient to be seen 5 x/week to address the above listed problems via gait training, therapeutic activities, therapeutic exercises, neuromuscular re-education,  "wheelchair management/training    Plan of Care Expires: 01/26/24  Plan of Care Reviewed with: patient    Subjective     "Very insecure, very insecure, very insecure."     Pain/Comfort:  Pain Rating 1: 0/10  Pain Rating Post-Intervention 1: other (see comments) ("Aching" in shoulders/neck; nurse notified.)    Patient's cultural, spiritual, Mandaeism conflicts given the current situation:  no    Objective:     Communicated with nurse prior to session.  Patient found up in chair with  (no active lines) upon PT entry to room.     Therapeutic Activities and Exercises:   Seated BLE exercises 2 x 10 reps including ankle DF/PF, LAQs, hip flexion; rest break as needed throughout    Increased time spent engaging in therapeutic listening and coaching patient through current needs (urge to urinate, need to belch and feeling insecure in balance). Nurse aware. Patient requiring step by step instruction on how to complete all transfers and mobility.     Functional Mobility:  Transfers:     Sit to Stand:  minimum assistance and moderate assistance with rolling walker and initial posterior trunk lean; cues to shift weight anterior during transfer for stable and erect posturing.  Gait: Initial stand included weightshift to R/L using RW with [place] to ensure patient feels comfortable and safe with mobility prior to forward ambulation. Patient then ambulated 119 feet and 70 feet using RW with Tata and tech following with chair. Patient requiring cues for direction change related to blindness in R eye. Seated rest break between trials.      AM-PAC 6 CLICK MOBILITY  15    Patient left up in chair with call button in reach and nurse notified.    GOALS:   Multidisciplinary Problems       Physical Therapy Goals          Problem: Physical Therapy    Goal Priority Disciplines Outcome Goal Variances Interventions   Physical Therapy Goal     PT, PT/OT Ongoing, Progressing     Description: Goals to be met by: 1/26/24     Patient will increase " functional independence with mobility by performin. Supine to sit with Supervision  2. Sit to supine with Supervision  3. Rolling to Left and Right with Supervision  4. Sit to stand transfer with Stand-by Assistance  5. Bed to chair transfer with Stand-by Assistance using Rolling Walker  6. Gait  x 150 feet with Stand-by Assistance using Rolling Walker  7. Wheelchair propulsion x 150 feet with Modified [place] using bilateral upper extremities  *Continue to assess living environment for steps to enter home; set goals as appropriate                         Time Tracking:     PT Received On: 23  PT Start Time: 958  PT Stop Time:   PT Total Time (min): 48 min    Billable Minutes: Gait Training 16, Therapeutic Activity 20, and Therapeutic Exercise 12    Treatment Type: Treatment  PT/PTA: PT     Number of PTA visits since last PT visit: 0     2023

## 2023-12-28 NOTE — CONSULTS
[facility] - Skilled Nursing  Wound Care    Patient Name:  Dimitri Johnson   MRN:  [id number]  Date: 12/28/2023  Diagnosis: Weakness    History:     Past Medical History:   Diagnosis Date    Glaucoma     Hypertension        Social History     Socioeconomic History    Marital status:    Tobacco Use    Smoking status: Never   Substance and Sexual Activity    Alcohol use: Yes     Comment: once a month     Social Determinants of Health     Financial Resource Strain: Low Risk  (12/23/2023)    Overall Financial Resource Strain (CARDIA)     Difficulty of Paying Living Expenses: Not hard at all   Food Insecurity: No Food Insecurity (12/23/2023)    Hunger Vital Sign     Worried About Running Out of Food in the Last Year: Never true     Ran Out of Food in the Last Year: Never true   Transportation Needs: No Transportation Needs (12/23/2023)    PRAPARE - Transportation     Lack of Transportation (Medical): No     Lack of Transportation (Non-Medical): No   Physical Activity: Inactive (12/23/2023)    Exercise Vital Sign     Days of Exercise per Week: 0 days     Minutes of Exercise per Session: 0 min   Stress: No Stress Concern Present (12/23/2023)    [employer] - Occupational Stress Questionnaire     Feeling of Stress : Only a little   Social Connections: Socially Isolated (12/23/2023)    Social Connection and Isolation Panel [NHANES]     Frequency of Communication with Friends and Family: Three times a week     Frequency of Social Gatherings with Friends and Family: Three times a week     Attends Zoroastrianism Services: Never     Active Member of Clubs or Organizations: No     Attends Club or Organization Meetings: Never     Marital Status:    Housing Stability: Unknown (12/23/2023)    Housing Stability Vital Sign     Unable to Pay for Housing in the Last Year: No     Unstable Housing in the Last Year: No       Precautions:     Allergies as of 12/26/2023 - Reviewed 12/26/2023   Allergen  Reaction Noted    Pcn [penicillins]  06/03/2014       [facility] Assessment Details/Treatment   Patient seen for wound care consultation.   Reviewed chart for this encounter.   See Flow Sheet for findings.    Mahesh: 15  Albumin: 3.1 (12/25)  Nutrition score:- 3 - adequate  Moisture score: - 3 - occasionally moist    Pt laying in bed, agreed to assessment.   Pt has diaper on, pt was able to turn with minimal assistance. Noted copious amount of white cream, cleansed with soap, warm water and washcloth revealing two areas of granulation tissue to bilateral buttocks, measurement below is right buttock.   Left buttock measures 0.1 x 0.1 x 0.1.   Applied Triad to bilateral buttocks.   Waffle ordered.     RECOMMENDATIONS:  Nutrition consult   Place waffle overlay on mattress -   Ensure waffle mattress overlay is inflated to the proper air amount, perform hand to verify proper immersion.  Apply Triad correctly :    Always cleanse wound before applying Triad.  To remove Triad:   Use pH-balanced wound cleanser to soften Triad  Gently wipe without scrubbing  For complete removal, repeat as needed.     Gently spread Triad evenly over the area of application to the thickness of a dime.    Assure Q2H turn protocol continues to be implemented.    Discussed POC with patient and primary nurse.   See EMR for orders & patient education.    Discussed nutrition and the role of protein in wound healing with the patient. Instructed patient to optimize protein for wound healing.    Bedside nursing to continue care & monitoring.  Bedside nursing to maintain pressure injury prevention interventions.       12/28/23 0620   WOCN Assessment   WOCN Total Time (mins) 30   Visit Date 12/28/23   Visit Time 0620   Consult Type New   WOCN Speciality Wound   Wound moisture   Intervention assessed;changed;applied;chart review;team conference;orders   Teaching on-going;complication        Altered Skin Integrity 12/28/23 0620 Buttocks Moisture associated  dermatitis   Date First Assessed/Time First Assessed: 12/28/23 0620   Altered Skin Integrity Present on Admission - Did Patient arrive to the hospital with altered skin?: suspected hospital acquired  Location: Buttocks  Primary Wound Type: Moisture associated derm...   Wound Image    Drainage Amount Scant   Drainage Characteristics/Odor Serosanguineous;No odor   Appearance Pink;Moist   Tissue loss description Partial thickness   Red (%), Wound Tissue Color 100 %   Periwound Area Scar tissue;Pink   Wound Length (cm) 0.3 cm   Wound Width (cm) 0.2 cm   Wound Depth (cm) 0.1 cm   Wound Volume (cm^3) 0.006 cm^3   Wound Surface Area (cm^2) 0.06 cm^2   Care Cleansed with:;Soap and water;Antimicrobial agent   Dressing Applied;Other (comment)  (Triad)   Off Loading Other (see comments)  (Waffle ordered)   Dressing Change Due 12/28/23

## 2023-12-28 NOTE — NURSING
Pt sitting up in bed eating dinner, assisted with meals, all items place to the left side of pt due to vision deficit. Denies needs at this time, reoriented to call light and to call staff for assistance with urinal and getting OOB.  Pt verbalizes understanding of call bell usage. Pt remains injury free throughout shift.

## 2023-12-28 NOTE — PLAN OF CARE
Problem: Adult Inpatient Plan of Care  Goal: Plan of Care Review  Outcome: Ongoing, Progressing  Goal: Patient-Specific Goal (Individualized)  Outcome: Ongoing, Progressing  Goal: Absence of Hospital-Acquired Illness or Injury  Outcome: Ongoing, Progressing  Goal: Optimal Comfort and Wellbeing  Outcome: Ongoing, Progressing  Intervention: Provide Person-Centered Care  Flowsheets (Taken 12/27/2023 2143)  Trust Relationship/Rapport:   care explained   choices provided   questions encouraged   questions answered   emotional support provided   reassurance provided   thoughts/feelings acknowledged

## 2023-12-28 NOTE — CLINICAL REVIEW
Clinical Pharmacy Chart Review Note      Admit Date: 12/26/2023   LOS: 2 days       Dimitri Johnson is a 89 y.o. male admitted to SNF for PT/OT after hospitalization for weakness.    Active Hospital Problems    Diagnosis  POA    *Weakness [R53.1]  Yes    DDD (degenerative disc disease), thoracolumbar [M51.35]  Unknown    Urinary retention [R33.9]  Yes    Primary hypertension [I10]  Yes    Constipation [K59.00]  Yes    Blindness, one eye [H54.40]  Yes      Resolved Hospital Problems   No resolved problems to display.     Review of patient's allergies indicates:   Allergen Reactions    Pcn [penicillins]      Patient Active Problem List    Diagnosis Date Noted    DDD (degenerative disc disease), thoracolumbar 12/27/2023    Osteoporosis 12/27/2023    Weakness 12/23/2023    Urinary retention 12/23/2023    Constipation 12/22/2023    Primary hypertension 12/22/2023    Corneal edema, unspecified 06/11/2014    Macular hole 06/11/2014    Blindness, one eye 06/11/2014       Scheduled Meds:    amLODIPine  5 mg Oral BID    ascorbic acid (vitamin C)  500 mg Oral BID    aspirin  81 mg Oral Daily    enoxparin  30 mg Subcutaneous Q24H (prophylaxis, 1700)    finasteride  5 mg Oral Daily    melatonin  6 mg Oral Nightly    polyethylene glycol  17 g Oral Daily    senna-docusate 8.6-50 mg  1 tablet Oral BID    tamsulosin  0.4 mg Oral Daily    zinc sulfate  220 mg Oral Daily     Continuous Infusions:   PRN Meds: acetaminophen, acetaminophen, bisacodyL, calcium carbonate, ibuprofen, methocarbamoL    OBJECTIVE:     Vital Signs (Last 24H)  Temp:  [98.3 °F (36.8 °C)]   Pulse:  [81]   Resp:  [18]   BP: (114-127)/(57-61)   SpO2:  [97 %]     Laboratory:  CBC:   Recent Labs   Lab 12/25/23  0615 12/26/23  0354 12/28/23  0556   WBC 10.22 12.00 11.27   RBC 4.19* 4.34* 4.25*   HGB 13.1* 13.6* 13.9*   HCT 38.2* 39.9* 40.0    303 325   MCV 91 92 94   MCH 31.3* 31.3* 32.7*   MCHC 34.3 34.1 34.8     BMP:   Recent Labs   Lab 12/24/23  0513  12/25/23  0615 12/26/23  0354 12/28/23  0556   GLU 93 102 101 99    136 136 135*   K 3.9 4.1 4.3 4.5    106 107 105   CO2 21* 22* 21* 23   BUN 15 17 23 28*   CREATININE 1.2 1.2 1.0 1.1   CALCIUM 8.8 8.6* 9.0 8.4*   MG 2.1 2.0  --  2.0     CMP:   Recent Labs   Lab 12/23/23  0509 12/24/23  0526 12/25/23  0615 12/26/23  0354 12/28/23  0556   GLU 92 93 102 101 99   CALCIUM 9.3 8.8 8.6* 9.0 8.4*   ALBUMIN 3.6 3.2* 3.1*  --   --    PROT 6.5 5.9* 5.8*  --   --     137 136 136 135*   K 4.0 3.9 4.1 4.3 4.5   CO2 23 21* 22* 21* 23    106 106 107 105   BUN 11 15 17 23 28*   CREATININE 1.2 1.2 1.2 1.0 1.1   ALKPHOS 79 73 67  --   --    ALT 19 16 16  --   --    AST 29 24 22  --   --    BILITOT 0.9 0.7 0.6  --   --      LFTs:   Recent Labs   Lab 12/23/23  0509 12/24/23  0526 12/25/23  0615   ALT 19 16 16   AST 29 24 22   ALKPHOS 79 73 67   BILITOT 0.9 0.7 0.6   PROT 6.5 5.9* 5.8*   ALBUMIN 3.6 3.2* 3.1*     Others:   Recent Labs   Lab 12/23/23  1235   TSH 8.775*         ASSESSMENT/PLAN:     I have reviewed the medications in compliance with CMS Regulation F756 of the MAGDA. No irregularities were noted at this time.    Medications reviewed by PharmD, please re-consult if needed.      Lucille Pak, Pharm. D.  Clinical Pharmacist  [facility]

## 2023-12-28 NOTE — PLAN OF CARE
Pt tolerated session fairly this date, limited by anxiety.     Problem: Occupational Therapy  Goal: Occupational Therapy Goal  Description: Goals to be met by: 1/17/2024     Patient will increase functional independence with ADLs by performing:    UE Dressing with Modified [place].  LE Dressing with Stand-by Assistance.  Grooming while seated at sink with Modified [place]. - ONGOING  Toileting from toilet with Stand-by Assistance for hygiene and clothing management.   Bathing from sitting/standing at sink with Stand-by Assistance.  Supine to sit with Modified [place]. - ONGOING  Step transfer with Stand-by Assistance with RW.  Toilet transfer to toilet with Stand-by Assistance with RW.    Outcome: Ongoing, Progressing

## 2023-12-28 NOTE — PROGRESS NOTES
[facility]                                  Skilled Nursing Facility                   Progress Note     Patient Name: Dimitri Johnson  YOB: 1933  MRN: [id number]  Room: [place] [id number] A     Admit Date: 12/26/2023   PALOMO: 1/16/2024     Principal Problem:  Weakness    HPI obtained from patient interview and chart review     Chief Complaint: Re-evaluation of medical treatment and therapy status, lab review, insomnia    HPI:   Dimitri Johnson is a 89 y.o. male admitted to [facility] 12/22 with weakness and constipation. He had associated stomach discomfort that was relieved after having a large bowel movement. Daily miralax and prn dulcolax regimen started. Patient has decreased mobility due to his degenerative disc disease and deconditioning. He is hesitant to walk and stand up. He worked with PT and OT who both recommended moderate intensity therapy.     Patient will be treated at [facility] with PT and OT to improve functional status and ability to perform ADLs.     Interval history:  24 hr vital sign ranges listed below. Labs reviewed and listed below. Patient denies shortness of breath, abdominal discomfort, nausea, or vomiting.  Patient reports an adequate appetite.  Patient denies dysuria.  Patient reports having regular bowel movements.  Patient progessing with PT/OT-  Gait: 119 feet and 70 feet using RW with Tata and tech following with chair  Continuing to follow and treat all acute and chronic conditions.     Past Medical History: Patient has a past medical history of Glaucoma and Hypertension. 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    Social History: Patient reports that he has never smoked. He does not have any smokeless tobacco history on file. He  reports current alcohol use.    Family History:  non contributory    Allergies: Patient is allergic to pcn [penicillins].        ROS  Constitutional:  Negative for fever.   HENT:  Negative for sore throat.    Eyes:  Negative for redness.   Respiratory:  Negative for shortness of breath.    Cardiovascular:  Negative for chest pain.   Gastrointestinal:  Negative for nausea.   Genitourinary:  Negative for dysuria.   Musculoskeletal:  Negative for back pain.  Positive RLE pain  Skin:  Negative for rash.   Neurological:  Positive for weakness.   Hematological:  Does not bruise/bleed easily.   Psychiatric/Behavioral: Negative insomnia      24 hour Vital Sign Range   Temp:  [97.8 °F (36.6 °C)-98.3 °F (36.8 °C)]   Pulse:  [81-99]   Resp:  [14-18]   BP: (114-127)/(57-61)   SpO2:  [96 %-97 %]       Physical Exam  General: Alert, frail, oriented x4 forgetful at times  HENT: Atraumatic, normocephalic, Right eye keteroplasty/blind, left eye poor vision, hard of hearing  CV: RRR; no murmurs, Normal s1, s2, no peripheral edema  Resp: CTAB with normal work of breathing and chest excursion on room air.   Abd: Soft, NTND. Bowel sounds normoactive  MSK: +2 radial pulses b/l. No edema, cyanosis, or erythema noted on extremities. Generalized weakness  Neuro: Normal appearing coordination and sensory function.   Skin: No rashes or lesions noted, dry, warm, dressing to left ankle,  dermatitis sacrum  Psych: calm, cooperative         Labs:  Recent Labs   Lab 12/25/23  0615 12/26/23  0354 12/28/23  0556   WBC 10.22 12.00 11.27   HGB 13.1* 13.6* 13.9*   HCT 38.2* 39.9* 40.0    303 325       Recent Labs   Lab 12/22/23  1843 12/23/23  0509 12/24/23  0526 12/25/23  0615 12/26/23  0354 12/28/23  0556   *   < > 137 136 136 135*   K 4.6   < > 3.9 4.1 4.3 4.5   CL 95   < > 106 106 107 105   CO2 18*   < > 21* 22* 21* 23   BUN 12   < > 15 17 23 28*   CREATININE 1.3   < > 1.2 1.2 1.0 1.1      < > 93 102 101 99   CALCIUM 9.3   < >  "8.8 8.6* 9.0 8.4*   MG  --    < > 2.1 2.0  --  2.0   PHOS  --    < > 4.5 3.8  --  3.4   LIPASE 59  --   --   --   --   --     < > = values in this interval not displayed.       Recent Labs   Lab 12/23/23  0509 12/24/23  0526 12/25/23  0615   ALKPHOS 79 73 67   ALT 19 16 16   AST 29 24 22   ALBUMIN 3.6 3.2* 3.1*   PROT 6.5 5.9* 5.8*   BILITOT 0.9 0.7 0.6       No results for input(s): "POCTGLUCOSE" in the last 72 hours.    Meds Scheduled:   amLODIPine  5 mg Oral BID    ascorbic acid (vitamin C)  500 mg Oral BID    aspirin  81 mg Oral Daily    enoxparin  30 mg Subcutaneous Q24H (prophylaxis, 1700)    finasteride  5 mg Oral Daily    melatonin  6 mg Oral Nightly    polyethylene glycol  17 g Oral Daily    senna-docusate 8.6-50 mg  1 tablet Oral BID    tamsulosin  0.4 mg Oral Daily    zinc sulfate  220 mg Oral Daily       PRN:   acetaminophen, bisacodyL, calcium carbonate, ibuprofen, methocarbamoL      Assessment and Plan:    Constipation  CT- Limited examination. Moderately large liquid stool within the patulous rectum.  Question possible diarrheal illness and/or fecal impaction.  Left renal cyst.     Prostatomegaly.  Osseous changes particularly of the spine.  Miralax daily, Dulcolax daily  Encourge oral intake  Avoid narcotics  LBM 12/28    Degenerative disc disease  Osteoporosis  Muscle spasms   Initiate order for Robaxin QID PRN, ibuprofen p.r.n. Q 8 H x3 days    Hyponatremia  Hypocalcemia  Dehydration  Na 135, BUN 28  Encourage oral intake  Monitor on routine lab  Initiate calcium with vitamin-D    Right lower extremity Baker's cyst, new  Noted on venous ultrasound  Continue ibuprofen prn pain, ice  Ambulatory referral to orthopedics    Insomnia  Scheduled melatonin  Initiate Remeron 7.5 mg nightly    Poor appetite  BMI 18   Starting Remeron  Assist with meals due to vision disturbance     Urinary retention  Ramirez placed  in ED for retention, discontinued prior to discharge.   Bladder scan q.shift x 3days   Urine " culture with staph however previous culture negative, asymptomatic   Initiate vitamin C     Weakness  Debility  Patient uses walker at baseline. His son reported  prior to admit he was unable to get off of toilet and ambulate.    B12 WNL TSH 8.7 but T4 WNL   PT/OT consult   Fall precaution  Lovenox 30mg daily for VTE ppx    Moisture associated dermatitis sacrum   Triad BID   Consult wound care   Vitamin-C and zinc     Primary hypertension  Home Norvasc 5 mg b.i.d.  Decrease Norvasc to 2.5 mg daily    Anticipate disposition:    Home with home health    IP [facility] RISK OF UNPLANNED READMISSION Model: LOW      Follow-up needed during SNF admission:   None    Follow-up needed after discharge from SNF:   - PCP within 1-2 weeks  - See appt scheduled below     No future appointments.      I certify that SNF services are required to be given on an inpatient basis because Dimitri Johnson needs for skilled nursing care and/or skilled rehabilitation are required on a daily basis and such services can only practically be provided in a skilled nursing facility setting and are for an ongoing condition for which she received inpatient care in the hospital.       Extended Visit:   Total time spent: 46 minutes  Description of Time: counseling provided on clinical condition, therapies provided, plan of care, emotional support, coordinating patient care with other care team members, reviewing and interpreting labs and imaging, collaboration with physician, initiating new orders, chart review, and documentation. See interval hx.       SADIE GÓMEZ  Department of Hospital Medicine   [facility]     DOS: 12/28/2023       Patient note was created using MModal Dictation.  Any errors in syntax or even information may not have been identified and edited on initial review prior to signing this note.

## 2023-12-29 PROBLEM — E43 SEVERE MALNUTRITION: Status: ACTIVE | Noted: 2023-12-29

## 2023-12-29 LAB
BILIRUB UR QL STRIP: NEGATIVE
CLARITY UR REFRACT.AUTO: CLEAR
COLOR UR AUTO: ABNORMAL
GLUCOSE UR QL STRIP: NEGATIVE
HGB UR QL STRIP: NEGATIVE
KETONES UR QL STRIP: NEGATIVE
LEUKOCYTE ESTERASE UR QL STRIP: ABNORMAL
MICROSCOPIC COMMENT: ABNORMAL
NITRITE UR QL STRIP: NEGATIVE
PH UR STRIP: 5 [PH] (ref 5–8)
PROT UR QL STRIP: NEGATIVE
RBC #/AREA URNS AUTO: 3 /HPF (ref 0–4)
SP GR UR STRIP: 1.01 (ref 1–1.03)
URN SPEC COLLECT METH UR: ABNORMAL
WBC #/AREA URNS AUTO: 15 /HPF (ref 0–5)

## 2023-12-29 PROCEDURE — 63600175 PHARM REV CODE 636 W HCPCS: Performed by: FAMILY MEDICINE

## 2023-12-29 PROCEDURE — 97530 THERAPEUTIC ACTIVITIES: CPT | Mod: CQ

## 2023-12-29 PROCEDURE — 87086 URINE CULTURE/COLONY COUNT: CPT | Performed by: FAMILY MEDICINE

## 2023-12-29 PROCEDURE — 25000003 PHARM REV CODE 250: Performed by: HOSPITALIST

## 2023-12-29 PROCEDURE — 81001 URINALYSIS AUTO W/SCOPE: CPT | Performed by: FAMILY MEDICINE

## 2023-12-29 PROCEDURE — 25000003 PHARM REV CODE 250: Performed by: FAMILY MEDICINE

## 2023-12-29 PROCEDURE — 11000004 HC SNF PRIVATE

## 2023-12-29 PROCEDURE — 87088 URINE BACTERIA CULTURE: CPT | Performed by: FAMILY MEDICINE

## 2023-12-29 PROCEDURE — 94761 N-INVAS EAR/PLS OXIMETRY MLT: CPT

## 2023-12-29 PROCEDURE — 97110 THERAPEUTIC EXERCISES: CPT | Mod: CQ

## 2023-12-29 PROCEDURE — 97116 GAIT TRAINING THERAPY: CPT | Mod: CQ

## 2023-12-29 PROCEDURE — 97535 SELF CARE MNGMENT TRAINING: CPT | Mod: CO

## 2023-12-29 RX ORDER — DOXYCYCLINE HYCLATE 100 MG
100 TABLET ORAL EVERY 12 HOURS
Status: DISCONTINUED | OUTPATIENT
Start: 2023-12-29 | End: 2023-12-30

## 2023-12-29 RX ORDER — HYDROCODONE BITARTRATE AND ACETAMINOPHEN 5; 325 MG/1; MG/1
1 TABLET ORAL EVERY 8 HOURS PRN
Status: DISCONTINUED | OUTPATIENT
Start: 2023-12-29 | End: 2024-01-23 | Stop reason: HOSPADM

## 2023-12-29 RX ADMIN — FINASTERIDE 5 MG: 5 TABLET, FILM COATED ORAL at 09:12

## 2023-12-29 RX ADMIN — ASPIRIN 81 MG CHEWABLE TABLET 81 MG: 81 TABLET CHEWABLE at 09:12

## 2023-12-29 RX ADMIN — METHOCARBAMOL 500 MG: 500 TABLET ORAL at 07:12

## 2023-12-29 RX ADMIN — AMLODIPINE BESYLATE 2.5 MG: 2.5 TABLET ORAL at 09:12

## 2023-12-29 RX ADMIN — TAMSULOSIN HYDROCHLORIDE 0.4 MG: 0.4 CAPSULE ORAL at 09:12

## 2023-12-29 RX ADMIN — DOXYCYCLINE HYCLATE 100 MG: 100 TABLET, COATED ORAL at 06:12

## 2023-12-29 RX ADMIN — DOXYCYCLINE HYCLATE 100 MG: 100 TABLET, COATED ORAL at 10:12

## 2023-12-29 RX ADMIN — MIRTAZAPINE 7.5 MG: 7.5 TABLET, FILM COATED ORAL at 09:12

## 2023-12-29 RX ADMIN — POLYETHYLENE GLYCOL 3350 17 G: 17 POWDER, FOR SOLUTION ORAL at 09:12

## 2023-12-29 RX ADMIN — Medication 2 TABLET: at 09:12

## 2023-12-29 RX ADMIN — ZINC SULFATE 220 MG (50 MG) CAPSULE 220 MG: CAPSULE at 09:12

## 2023-12-29 RX ADMIN — HYDROCODONE BITARTRATE AND ACETAMINOPHEN 1 TABLET: 5; 325 TABLET ORAL at 07:12

## 2023-12-29 RX ADMIN — Medication 500 MG: at 09:12

## 2023-12-29 RX ADMIN — Medication 6 MG: at 09:12

## 2023-12-29 RX ADMIN — ACETAMINOPHEN 650 MG: 325 TABLET ORAL at 07:12

## 2023-12-29 RX ADMIN — SENNOSIDES AND DOCUSATE SODIUM 1 TABLET: 8.6; 5 TABLET ORAL at 09:12

## 2023-12-29 RX ADMIN — ENOXAPARIN SODIUM 30 MG: 30 INJECTION SUBCUTANEOUS at 05:12

## 2023-12-29 NOTE — CARE UPDATE
Interdisciplinary team, Nicole Colin, RN Unit Director, and Chula DahlOT, Rehab, spoke to patient for care plan conference, weekly status update, and therapy progress update. Tentative discharge date set for 1/16/23.

## 2023-12-29 NOTE — PROGRESS NOTES
[facility]                                  Skilled Nursing Facility                   Progress Note     Patient Name: Dimitri Johnson  YOB: 1933  MRN: [id number]  Room: [place] [id number] A     Admit Date: 12/26/2023   PALOMO: 1/16/2024     Principal Problem:  Weakness    HPI obtained from patient interview and chart review     Chief Complaint: Re-evaluation of medical treatment and therapy status, urinary retention, malnutrition    HPI:   Dimitri Johnson is a 89 y.o. male admitted to [facility] 12/22 with weakness and constipation. He had associated stomach discomfort that was relieved after having a large bowel movement. Daily miralax and prn dulcolax regimen started. Patient has decreased mobility due to his degenerative disc disease and deconditioning. He is hesitant to walk and stand up. He worked with PT and OT who both recommended moderate intensity therapy.     Patient will be treated at [facility] with PT and OT to improve functional status and ability to perform ADLs.     Interval history:  Observed yelling early AM he can't urinate   Patient does not recall this episode although is oriented x3  Residual scan with 200ml urine  Order for straight cath d/t discomfort and repeat UA C&S, [place] 5 mg Q 8 H p.r.n., doxycycline x5 days, continue finasteride  Replaced Ramirez d/t retention >300ml with discomfort, will need urology f/u outpatient  KUB no constipation  Severe malnutrition d/w RD, continue Remeron, supplements, assist with meals due to blindness  Patient progessing with PT/OT-   pt amb 80 ft + 120 ft CGA/Min A with RW Continuing to follow and treat all acute and chronic conditions.     Past Medical History: Patient has a past medical history of Glaucoma and Hypertension. 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    Social History: Patient reports that he has never smoked. He does not have any smokeless tobacco history on file. He reports current alcohol use.    Family History:  non contributory    Allergies: Patient is allergic to pcn [penicillins].        ROS  Constitutional:  Negative for fever.  Positive forgetfulness  HENT:  Negative for sore throat.    Eyes:  Negative for redness.   Respiratory:  Negative for shortness of breath.    Cardiovascular:  Negative for chest pain.   Gastrointestinal:  Negative for nausea.   Genitourinary:  Negative for dysuria.   Musculoskeletal:  Negative for back pain.    Skin:  Negative for rash.   Neurological:  Positive for weakness.   Hematological:  Does not bruise/bleed easily.   Psychiatric/Behavioral: Negative insomnia      24 hour Vital Sign Range   Temp:  [97.7 °F (36.5 °C)-97.8 °F (36.6 °C)]   Pulse:  [81-84]   Resp:  [16-18]   BP: (131-134)/(70-79)   SpO2:  [98 %-99 %]       Physical Exam  General: Alert, frail, thin, oriented x4 forgetful at times  HENT: Atraumatic, normocephalic, Right eye keteroplasty/blind, left eye poor vision, hard of hearing  CV: RRR; no murmurs, Normal s1, s2, no peripheral edema  Resp: CTAB with normal work of breathing and chest excursion on room air.   Abd: Soft, NTND. Bowel sounds normoactive  MSK: Generalized weakness  : Mixed incontinence  Neuro: Normal appearing coordination and sensory function.   Skin: No rashes or lesions noted, dry, warm, dressing to left ankle,  dermatitis sacrum  Psych: calm, cooperative, yells out at time         Labs:  Recent Labs   Lab 12/25/23  0615 12/26/23  0354 12/28/23  0556   WBC 10.22 12.00 11.27   HGB 13.1* 13.6* 13.9*   HCT 38.2* 39.9* 40.0    303 325       Recent Labs   Lab 12/22/23  1843 12/23/23  0509 12/24/23  0526 12/25/23  0615 12/26/23  0354 12/28/23  0556   *   < > 137 136 136 135*   K 4.6   < > 3.9 4.1 4.3 4.5   CL 95   < > 106 106  "107 105   CO2 18*   < > 21* 22* 21* 23   BUN 12   < > 15 17 23 28*   CREATININE 1.3   < > 1.2 1.2 1.0 1.1      < > 93 102 101 99   CALCIUM 9.3   < > 8.8 8.6* 9.0 8.4*   MG  --    < > 2.1 2.0  --  2.0   PHOS  --    < > 4.5 3.8  --  3.4   LIPASE 59  --   --   --   --   --     < > = values in this interval not displayed.       Recent Labs   Lab 12/23/23  0509 12/24/23  0526 12/25/23  0615   ALKPHOS 79 73 67   ALT 19 16 16   AST 29 24 22   ALBUMIN 3.6 3.2* 3.1*   PROT 6.5 5.9* 5.8*   BILITOT 0.9 0.7 0.6       No results for input(s): "POCTGLUCOSE" in the last 72 hours.    Meds Scheduled:   amLODIPine  2.5 mg Oral Daily    ascorbic acid (vitamin C)  500 mg Oral BID    aspirin  81 mg Oral Daily    calcium-vitamin D3  2 tablet Oral Daily    doxycycline  100 mg Oral Q12H    enoxparin  30 mg Subcutaneous Q24H (prophylaxis, 1700)    finasteride  5 mg Oral Daily    melatonin  6 mg Oral Nightly    mirtazapine  7.5 mg Oral QHS    polyethylene glycol  17 g Oral Daily    senna-docusate 8.6-50 mg  1 tablet Oral BID    tamsulosin  0.4 mg Oral Daily    zinc sulfate  220 mg Oral Daily       PRN:   acetaminophen, bisacodyL, calcium carbonate, HYDROcodone-acetaminophen, ibuprofen, methocarbamoL      Assessment and Plan:    Constipation  CT- Limited examination. Moderately large liquid stool within the patulous rectum.  Question possible diarrheal illness and/or fecal impaction.  Left renal cyst.     Prostatomegaly.  Osseous changes particularly of the spine.  Miralax daily, Dulcolax daily  Encourge oral intake  Avoid narcotics  LBM 12/28  -12/29 KUB no constipation    Degenerative disc disease  Osteoporosis  Muscle spasms   Continue Robaxin QID PRN, ibuprofen p.r.n. Q 8 H x3 days  -12/29:  Initiate [place] 5 mg q8h PRN    Hyponatremia  Hypocalcemia  Dehydration  Encourage oral intake  Monitor on routine lab  Calcium with vitamin-D    Severe malnutrition  Poor appetite  previous weight unknown  RD following  Remeron started  BMI 18 "   Assist with meals due to vision disturbance    Right lower extremity Baker's cyst  Noted on venous ultrasound  Continue ibuprofen prn pain, ice  Ambulatory referral to orthopedics    Insomnia  Scheduled melatonin  Remeron 7.5 mg nightly     Urinary retention  Ramirez placed  in ED for retention, discontinued prior to discharge.   Bladder scan q.shift x 3days   Urine culture with staph however previous culture negative, asymptomatic   vitamin C, finasteride  -12/29 Failed void trial, Ramirez replaced. Urology f/u outpatient     Weakness  Debility  Patient uses walker at baseline. His son reported  prior to admit he was unable to get off of toilet and ambulate.    B12 WNL TSH 8.7 but T4 WNL   PT/OT consult   Fall precaution  Lovenox 30mg daily for VTE ppx    Moisture associated dermatitis sacrum   Triad BID   Consult wound care   Vitamin-C and zinc     Primary hypertension  Home Norvasc 5 mg b.i.d.  Decrease Norvasc to 2.5 mg daily    Anticipate disposition:    Home with home health    IP [facility] RISK OF UNPLANNED READMISSION Model: LOW      Follow-up needed during SNF admission:   None    Follow-up needed after discharge from SNF:   - PCP within 1-2 weeks  - See appt scheduled below     No future appointments.      I certify that SNF services are required to be given on an inpatient basis because Dimitri Johnson needs for skilled nursing care and/or skilled rehabilitation are required on a daily basis and such services can only practically be provided in a skilled nursing facility setting and are for an ongoing condition for which she received inpatient care in the hospital.       Extended Visit:   Total time spent: 46 minutes  Description of Time: counseling provided on clinical condition, therapies provided, plan of care, emotional support, coordinating patient care with other care team members, reviewing and interpreting labs and imaging, collaboration with physician, initiating new orders, chart review, and documentation.  See interval hx.       SADIE GÓMEZ  Department of Hospital Medicine   [facility]     DOS: 12/29/2023       Patient note was created using MModal Dictation.  Any errors in syntax or even information may not have been identified and edited on initial review prior to signing this note.

## 2023-12-29 NOTE — PROGRESS NOTES
[facility] - Skilled Nursing  Adult Nutrition  Progress Note    SUMMARY   Recommendations  Continue with regular diet, boost plus BID vanilla, assist with set up,RD following  Goals: PO to meet 75% of EEN/EPN with ONS by next RD follow up  Nutrition Goal Status: progressing towards goal  Communication of RD Recs: other (comment) (POC)    Assessment and Plan    Endocrine  Severe malnutrition  Malnutrition Type:  Context:  social/environmental  Level:  severe    Related to (etiology):   Inadequate oral  intake,     Signs and Symptoms (as evidenced by):   BMI 18     Malnutrition Characteristic Summary:   5% weight loss in one month  Severe fat loss  Moderate to severe muscle loss      Interventions/Recommendations (treatment strategy):  Continue with regular diet, boost plus BID vanilla, assist with set up,RD following    Nutrition Diagnosis Status:   New         Malnutrition Assessment 12/29     Skin (Micronutrient): none, bruised, dry, pallor  Neck/Chest (Micronutrient): muscle wasting, bony prominence, subcutaneous fat loss  Musculoskeletal/Lower Extremities: subcutaneous fat loss, muscle control poor, muscle wasting           Orbital Region (Subcutaneous Fat Loss): severe depletion  Upper Arm Region (Subcutaneous Fat Loss): severe depletion  Thoracic and Lumbar Region: severe depletion   Denominational Region (Muscle Loss): moderate depletion  Clavicle Bone Region (Muscle Loss): moderate depletion  Clavicle and Acromion Bone Region (Muscle Loss): severe depletion  Scapular Bone Region (Muscle Loss): severe depletion  Dorsal Hand (Muscle Loss): severe depletion  Patellar Region (Muscle Loss): moderate depletion  Anterior Thigh Region (Muscle Loss): moderate depletion  Posterior Calf Region (Muscle Loss): moderate depletion                 Reason for Assessment    Reason For Assessment: RD follow-up  Diagnosis:  (wekaness, constipation)  Relevant Medical History: HTN, deconditioning  Interdisciplinary Rounds: did not  "attend  General Information Comments: Cannot remember what he ate for lunch or if he had boost supplement to drink, NFPE performed showing moderate to severe fat and muscle wasting. Patient has his own teeth, his swallow was slow today, he lives with son and daughter. He states his usual weight was 167-168 pounds. RD does not find a weight history, it appears he has lost 5% body weight this past month.  no chewing problems,  Nutrition Discharge Planning: DC on regular diet, ONS of choice for weight gain    Nutrition/Diet History    Patient Reported Diet/Restrictions/Preferences: general  Spiritual, Cultural Beliefs, Zoroastrian Practices, Values that Affect Care: no  Food Allergies: NKFA  Factors Affecting Nutritional Intake: decreased appetite, constipation    Anthropometrics    Temp: 97.8 °F (36.6 °C)  Height Method: Stated  Height: 5' 11" (180.3 cm)  Height (inches): 71 in  Weight Method: Bed Scale  Weight: 58.8 kg (129 lb 10.1 oz)  Weight (lb): 129.63 lb  Ideal Body Weight (IBW), Male: 172 lb  % Ideal Body Weight, Male (lb): 75.37 %  BMI (Calculated): 18.1  BMI Grade: 18.5-24.9 - normal  Usual Body Weight (UBW), k.6 kg  % Usual Body Weight: 95.65  % Weight Change From Usual Weight: -4.55 %     Patient is at 78% of UBW per his report  Lab/Procedures/Meds    Pertinent Labs Reviewed: reviewed  Pertinent Labs Comments: Na 135, Hg 13.9, BUN 28, Ca 8.4  Pertinent Medications Reviewed: reviewed  Pertinent Medications Comments: Vit C, ASA, zinc, senna-docusate, polyetylene glycol, Lovenox,    Estimated/Assessed Needs    Weight Used For Calorie Calculations: 61.6 kg (135 lb 12.9 oz) (UBW)  Energy Calorie Requirements (kcal): 0062-2535  Energy Need Method: Kcal/kg (25-30 kcal/kg for weight gain)  Protein Requirements: 80g  Weight Used For Protein Calculations: 61.6 kg (135 lb 12.9 oz) (UBW x 1.3)  Fluid Requirements (mL): 1540 or per MD  Estimated Fluid Requirement Method: RDA Method  RDA Method (mL): 1540  CHO " Requirement: -      Nutrition Prescription Ordered    Current Diet Order: Regular  Nutrition Order Comments: PO 75%, 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  Oral Nutrition Supplement: boost plus vanilla BID    Evaluation of Received Nutrient/Fluid Intake    I/O: no data  Energy Calories Required: not meeting needs  Protein Required: not meeting needs  Fluid Required: meeting needs  Comments: LBM 12/28  Tolerance: tolerating  % Intake of Estimated Energy Needs: 50 - 75 %  % Meal Intake: 50 - 75 %    Nutrition Risk    Level of Risk/Frequency of Follow-up: low (one time per week)     Monitor and Evaluation    Food and Nutrient Intake: food and beverage intake  Food and Nutrient Adminstration: diet order  Physical Activity and Function: nutrition-related ADLs and IADLs  Anthropometric Measurements: weight change  Biochemical Data, Medical Tests and Procedures: electrolyte and renal panel, gastrointestinal profile  Nutrition-Focused Physical Findings: overall appearance     Nutrition Follow-Up    RD Follow-up?: Yes

## 2023-12-29 NOTE — PT/OT/SLP PROGRESS
"Occupational Therapy   Treatment    Name: Dimitri Johnson  MRN: [id number]  Admit Date: 12/26/2023  Admitting Diagnosis:  Weakness    General Precautions: Standard, fall, vision impaired   Orthopedic Precautions: N/A   Braces: N/A    Recommendations:     Discharge Recommendations:  home health OT  Level of Assistance Recommended at Discharge: 24 hours physical assistance for all ADL's and home management tasks  Discharge Equipment Recommendations: wheelchair, 3-in-1 commode  Barriers to discharge:   (increased A needed for mobility)    Assessment:     Dimitri Johnson is a 89 y.o. male with a medical diagnosis of Weakness.  He presents with limitations in performance of self-care, functional mobility, and ADLs. Performance deficits affecting function are weakness, impaired endurance, impaired self care skills, impaired functional mobility, gait instability, decreased lower extremity function, decreased safety awareness, impaired cognition, impaired coordination, decreased ROM, impaired skin. Pt tolerated Tx without incident, however required encouragement throughout Tx. Pt is making progress but continues to require assist to perform self care tasks, functional mobility and functional transfers. Pt would continue to benefit from OT intervention to further functional (I)ce and safety.     Rehab Potential is fair    Activity tolerance:  Fair    Plan:     Patient to be seen 5 x/week to address the above listed problems via self-care/home management, therapeutic activities, therapeutic exercises    Plan of Care Expires: 01/25/24  Plan of Care Reviewed with: patient    Subjective     When asked to perform self care task pt stated, "This seems insignificant in my recovery."    Communicated with: Nursing prior to session.     Pain/Comfort:  Pain Rating 1: 0/10  Pain Rating Post-Intervention 1: 0/10    Patient's cultural, spiritual, Hoahaoism conflicts given the current situation:  no    Objective:     Patient found HOB elevated with "  (no lines) upon OT entry to room.    Bed Mobility:    Patient completed Rolling/Turning to Left with  stand by assistance and with side rail  Patient completed Rolling/Turning to Right with stand by assistance and with side rail  Patient completed Scooting/Bridging with contact guard assistance and with side rail  Patient completed Supine to Sit with minimum assistance and with side rail with v/c for encouragement    Functional Mobility/Transfers:  Patient completed Sit <> Stand Transfer with minimum assistance  with  rolling walker with v/c for encouragement and technique/hand placement  Patient completed Bed <> Chair Transfer using Stand Pivot technique with minimum assistance with rolling walker with v/c for sequencing/ hand placement.    Activities of Daily Living:  Personal Hygiene with stand by assistance while seated at sink level with v/c for sequencing  Upper Body Dressing with supervision to doff/naseem pull over shirt  Lower Body Dressing with maximal assistance to thread BLE performed EOB with (A) to thread RLE and (A) to orient foot when threading LLE with (A) to steady seated EOB and (A) to steady in standing with RW when managing pants over hips with v/c sequencing/encouragement   Footwear with maximal assistance to don socks without adaptive equipment   Toileting with maximal assistance from bed level with pt performing front mauricio care with (A) to doff/naseem pull tab brief with (A) for rear mauricio care     [facility] 6 Click ADL: 17    Treatment & Education:  Pt educated on:  - role of OT  - level of assistance  - energy conservation and task modification to maximized independence with ADL's and mobility   -  safety while performing functional transfers and self care tasks  - progress towards OT goals    Patient left up in chair with call button in reach    GOALS:   Multidisciplinary Problems       Occupational Therapy Goals          Problem: Occupational Therapy    Goal Priority Disciplines Outcome  Interventions   Occupational Therapy Goal     OT, PT/OT Ongoing, Progressing    Description: Goals to be met by: 1/17/2024     Patient will increase functional independence with ADLs by performing:    UE Dressing with Modified [place].  LE Dressing with Stand-by Assistance.  Grooming while seated at sink with Modified [place].  Toileting from toilet with Stand-by Assistance for hygiene and clothing management.   Bathing from sitting/standing at sink with Stand-by Assistance.  Supine to sit with Modified [place].  Step transfer with Stand-by Assistance with RW.  Toilet transfer to toilet with Stand-by Assistance with RW.                         Time Tracking:     OT Date of Treatment: 12/29/23  OT Start Time: 0930    OT Stop Time: 0958  OT Total Time (min): 28 min    Billable Minutes:Self Care/Home Management 28    12/29/2023  A client care conference was performed between the JORDYNR and FLOR, prior to treatment by FLOR, to discuss the patient's status, treatment plan and established goals.

## 2023-12-29 NOTE — NURSING
Pt reports moderate relief and pain 3/10, he is no longer yelling out, and is sitting up in bed listening to his radio.

## 2023-12-29 NOTE — NURSING
Pt remains injury free throughout shift, Safety precautions maintained, pt in bed watching television. He reports that he feels very relieved that he had a bowel movement on today. Call bell placed in hand due to visual deficit, bell in lowest position, pt reoriented to room and reminded to press the red button when staff is needed, Care will continue.

## 2023-12-29 NOTE — ASSESSMENT & PLAN NOTE
Malnutrition Type:  Context:  social/environmental  Level:  severe    Related to (etiology):   Inadequate oral  intake,     Signs and Symptoms (as evidenced by):   BMI 18     Malnutrition Characteristic Summary:   5% weight loss in one month  Severe fat loss  Moderate to severe muscle loss      Interventions/Recommendations (treatment strategy):  Continue with regular diet, boost plus BID vanilla, assist with set up,RD following    Nutrition Diagnosis Status:   New

## 2023-12-29 NOTE — PLAN OF CARE
Problem: Adult Inpatient Plan of Care  Goal: Plan of Care Review  Outcome: Ongoing, Progressing  Goal: Patient-Specific Goal (Individualized)  Outcome: Ongoing, Progressing  Goal: Absence of Hospital-Acquired Illness or Injury  Outcome: Ongoing, Progressing  Intervention: Identify and Manage Fall Risk  Flowsheets (Taken 12/28/2023 2103)  Safety Promotion/Fall Prevention:   assistive device/personal item within reach   Fall Risk reviewed with patient/family   lighting adjusted   side rails raised x 2   instructed to call staff for mobility   medications reviewed   nonskid shoes/socks when out of bed  Intervention: Prevent Skin Injury  Flowsheets (Taken 12/28/2023 2103)  Body Position: turned  Skin Protection:   incontinence pads utilized   skin sealant/moisture barrier applied  Goal: Optimal Comfort and Wellbeing  Outcome: Ongoing, Progressing

## 2023-12-29 NOTE — PT/OT/SLP PROGRESS
"Physical Therapy Treatment    Patient Name:  Dimitri Johnson   MRN:  [id number]  Admit Date: 12/26/2023  Admitting Diagnosis: Weakness  Recent Surgeries: N/A    General Precautions: Standard, fall, vision impaired  Orthopedic Precautions: N/A  Braces: N/A    Recommendations:     Discharge Recommendations: home health PT  Level of Assistance Recommended at Discharge: 24 hours significant assistance  Discharge Equipment Recommendations: wheelchair, 3-in-1 commode  Barriers to discharge: Other (Comment) (Increased assistance for mobility)    Assessment:     Dimitri Johnson is a 89 y.o. male admitted with a medical diagnosis of Weakness . Pt tolerated well, pt required vc's, tc's and demos throughout session for efficient sequencing in all aspects of therapy. Pt able to increase amb distance x 2 trials. Pt educated on posture and TE to correct chronically flexed cervical/trunk. Pt transfers with Mod A and vc's for STS. Pt would continue to benefit from skilled PT services to improve overall functional mobility, strength and endurance.      Performance deficits affecting function: weakness, impaired endurance, impaired self care skills, impaired functional mobility, gait instability, impaired balance, decreased lower extremity function, visual deficits, decreased safety awareness, decreased coordination.    Rehab Potential is good    Activity Tolerance: Good    Plan:     Patient to be seen 5 x/week to address the above listed problems via gait training, therapeutic activities, therapeutic exercises, neuromuscular re-education, wheelchair management/training    Plan of Care Expires: 01/26/24  Plan of Care Reviewed with: patient    Subjective     "Im very nervous about standing, my balance is bad".     "It hurts here" - pt pointing to neck/ trapezius area    Pain/Comfort:  Pain Rating 1: 0/10  Location - Side 1: Bilateral  Location - Orientation 1: generalized  Location 1: neck  Pain Addressed 1: Reposition, Distraction, Cessation " of Activity  Pain Rating Post-Intervention 1:  (not rated  pt pointed to neck)    Patient's cultural, spiritual, Evangelical conflicts given the current situation:  no    Objective:      Patient found up in chair with  (no lines) upon PT entry to room.     Therapeutic Activities and Exercises: seated TE (BLE): hip flex, LAQ X 20 reps for BEL strengthening.             Seated TE (BUE): overhead rows, chin tucks, shoulder press (w/ scapula retraction) x 20 reps for BUE strengthening/ ROM    Patient educated on role of therapy, goals of session, and benefits of out of bed mobility.   Instructed on use of call button and importance of calling nursing staff for assistance with mobility   Questions/concerns addressed within PTA scope of practice  Pt verbalized understanding    Extended time explaining posture/TE with patient.    Functional Mobility:  Transfers:     Sit to Stand:  moderate assistance with rolling walker, vc, tc and demo for efficient sequencing   Gait: pt amb 80 ft + 120 ft CGA/Min A with RW. Vc and tc for posture and gaze direction, step through gait. Pt very anxious about balance/ falling    AM-PAC 6 CLICK MOBILITY  15    Patient left up in chair with call button in reach.    GOALS:   Multidisciplinary Problems       Physical Therapy Goals          Problem: Physical Therapy    Goal Priority Disciplines Outcome Goal Variances Interventions   Physical Therapy Goal     PT, PT/OT Ongoing, Progressing     Description: Goals to be met by: 24     Patient will increase functional independence with mobility by performin. Supine to sit with Supervision  2. Sit to supine with Supervision  3. Rolling to Left and Right with Supervision  4. Sit to stand transfer with Stand-by Assistance  5. Bed to chair transfer with Stand-by Assistance using Rolling Walker  6. Gait  x 150 feet with Stand-by Assistance using Rolling Walker  7. Wheelchair propulsion x 150 feet with Modified [place] using bilateral upper  extremities  *Continue to assess living environment for steps to enter home; set goals as appropriate                         Time Tracking:     PT Received On: 12/29/23  PT Start Time: 1002  PT Stop Time: 1040  PT Total Time (min): 38 min    Billable Minutes: Gait Training 13, Therapeutic Activity 10, and Therapeutic Exercise 15    Treatment Type: Treatment  PT/PTA: PTA     Number of PTA visits since last PT visit: 1     12/29/2023

## 2023-12-29 NOTE — PLAN OF CARE
Problem: Occupational Therapy  Goal: Occupational Therapy Goal  Description: Goals to be met by: 1/17/2024     Patient will increase functional independence with ADLs by performing:    UE Dressing with Modified [place].  LE Dressing with Stand-by Assistance.  Grooming while seated at sink with Modified [place].  Toileting from toilet with Stand-by Assistance for hygiene and clothing management.   Bathing from sitting/standing at sink with Stand-by Assistance.  Supine to sit with Modified [place].  Step transfer with Stand-by Assistance with RW.  Toilet transfer to toilet with Stand-by Assistance with RW.    Outcome: Ongoing, Progressing

## 2023-12-29 NOTE — NURSING
"Upon entrance to room,pt is yelling out" I have to pee and I can't" repetitively, and that he cannot go on like this knock me out! His abdomen is non distended, Bladder Scan shows 200 ml. Called NP Camilla Gonzalez gave verbal order to perform straight cath and collect UA, she will also place order for pain med. Pt given PRN Tylenol 650 mg and Methocarbamol 500 mg.  "

## 2023-12-30 LAB — BACTERIA UR CULT: ABNORMAL

## 2023-12-30 PROCEDURE — 97110 THERAPEUTIC EXERCISES: CPT | Mod: CQ

## 2023-12-30 PROCEDURE — 97530 THERAPEUTIC ACTIVITIES: CPT

## 2023-12-30 PROCEDURE — 97110 THERAPEUTIC EXERCISES: CPT

## 2023-12-30 PROCEDURE — 97535 SELF CARE MNGMENT TRAINING: CPT

## 2023-12-30 PROCEDURE — 25000003 PHARM REV CODE 250: Performed by: FAMILY MEDICINE

## 2023-12-30 PROCEDURE — 63600175 PHARM REV CODE 636 W HCPCS: Performed by: FAMILY MEDICINE

## 2023-12-30 PROCEDURE — 97116 GAIT TRAINING THERAPY: CPT | Mod: CQ

## 2023-12-30 PROCEDURE — 25000003 PHARM REV CODE 250: Performed by: HOSPITALIST

## 2023-12-30 PROCEDURE — 11000004 HC SNF PRIVATE

## 2023-12-30 RX ORDER — DOXYCYCLINE HYCLATE 100 MG
100 TABLET ORAL EVERY 12 HOURS
Status: COMPLETED | OUTPATIENT
Start: 2023-12-30 | End: 2024-01-06

## 2023-12-30 RX ADMIN — FINASTERIDE 5 MG: 5 TABLET, FILM COATED ORAL at 10:12

## 2023-12-30 RX ADMIN — Medication 500 MG: at 10:12

## 2023-12-30 RX ADMIN — POLYETHYLENE GLYCOL 3350 17 G: 17 POWDER, FOR SOLUTION ORAL at 10:12

## 2023-12-30 RX ADMIN — DOXYCYCLINE HYCLATE 100 MG: 100 TABLET, COATED ORAL at 06:12

## 2023-12-30 RX ADMIN — SENNOSIDES AND DOCUSATE SODIUM 1 TABLET: 8.6; 5 TABLET ORAL at 10:12

## 2023-12-30 RX ADMIN — MIRTAZAPINE 7.5 MG: 7.5 TABLET, FILM COATED ORAL at 10:12

## 2023-12-30 RX ADMIN — Medication 2 TABLET: at 10:12

## 2023-12-30 RX ADMIN — ENOXAPARIN SODIUM 30 MG: 30 INJECTION SUBCUTANEOUS at 04:12

## 2023-12-30 RX ADMIN — Medication 6 MG: at 10:12

## 2023-12-30 RX ADMIN — ASPIRIN 81 MG CHEWABLE TABLET 81 MG: 81 TABLET CHEWABLE at 10:12

## 2023-12-30 RX ADMIN — METHOCARBAMOL 500 MG: 500 TABLET ORAL at 10:12

## 2023-12-30 RX ADMIN — TAMSULOSIN HYDROCHLORIDE 0.4 MG: 0.4 CAPSULE ORAL at 10:12

## 2023-12-30 RX ADMIN — AMLODIPINE BESYLATE 2.5 MG: 2.5 TABLET ORAL at 10:12

## 2023-12-30 RX ADMIN — ZINC SULFATE 220 MG (50 MG) CAPSULE 220 MG: CAPSULE at 10:12

## 2023-12-30 NOTE — PT/OT/SLP PROGRESS
"Physical Therapy Treatment    Patient Name:  Dimitri Johnson   MRN:  [id number]  Admit Date: 12/26/2023  Admitting Diagnosis: Weakness  Recent Surgeries:     General Precautions: Standard, fall, vision impaired  Orthopedic Precautions: N/A  Braces: N/A    Recommendations:     Discharge Recommendations: home health PT  Level of Assistance Recommended at Discharge: 24 hours significant assistance  Discharge Equipment Recommendations: 3-in-1 commode, wheelchair  Barriers to discharge: Decreased caregiver support    Assessment:     Dimitri Johnson is a 89 y.o. male admitted with a medical diagnosis of Weakness . Patient appears anxious and fearfull as He transfers from sit to stand, but once he starts mobilizing, his confidence improves.      Performance deficits affecting function: weakness, impaired endurance, impaired self care skills, impaired functional mobility, gait instability, impaired balance, decreased coordination, decreased safety awareness.    Rehab Potential is good    Activity Tolerance: Good    Plan:     Patient to be seen 5 x/week to address the above listed problems via gait training, therapeutic activities, therapeutic exercises, neuromuscular re-education, wheelchair management/training    Plan of Care Expires: 01/26/24  Plan of Care Reviewed with: patient    Subjective     Patient states " My buttocks feels a little sore from not moving around  too much".     Pain/Comfort:  Pain Rating 1: 2/10  Location - Side 1: Bilateral  Location - Orientation 1: generalized  Location 1: gluteal  Pain Addressed 1: Reposition, Distraction  Pain Rating Post-Intervention 1: 1/10    Patient's cultural, spiritual, Voodoo conflicts given the current situation:  no    Objective:     Communicated with NSG prior to session.  Patient found up in chair with gray catheter upon PT entry to room.     Therapeutic Activities and Exercises: Le Ergometer x 15 minutes with rest breaks and min assistance to initiate movement. "     Functional Mobility:  Transfers:     Sit to Stand:  moderate assistance with rolling walker  Gait: 120 ft x 2 trials with RW and min to CGA, with w/c in tow by P.T. tech.  Wheelchair Propulsion:  Pt propelled Standard wheelchair x 80 feet on Level tile with  Right upper extremity and Left upper extremity with Stand-by Assistance.     AM-PAC 6 CLICK MOBILITY  15    Patient left up in chair with all lines intact and call button in reach.    GOALS:   Multidisciplinary Problems       Physical Therapy Goals          Problem: Physical Therapy    Goal Priority Disciplines Outcome Goal Variances Interventions   Physical Therapy Goal     PT, PT/OT Ongoing, Progressing     Description: Goals to be met by: 24     Patient will increase functional independence with mobility by performin. Supine to sit with Supervision  2. Sit to supine with Supervision  3. Rolling to Left and Right with Supervision  4. Sit to stand transfer with Stand-by Assistance  5. Bed to chair transfer with Stand-by Assistance using Rolling Walker  6. Gait  x 150 feet with Stand-by Assistance using Rolling Walker  7. Wheelchair propulsion x 150 feet with Modified [place] using bilateral upper extremities  *Continue to assess living environment for steps to enter home; set goals as appropriate                         Time Tracking:     PT Received On: 23  PT Start Time: 1315  PT Stop Time: 1355  PT Total Time (min): 40 min    Billable Minutes: Gait Training 15 and Therapeutic Exercise 25    Treatment Type: Treatment  PT/PTA: PTA     Number of PTA visits since last PT visit: 2     2023

## 2023-12-30 NOTE — PT/OT/SLP PROGRESS
"Occupational Therapy   Treatment    Name: Dimitri Johnson  MRN: [id number]  Admit Date: 12/26/2023  Admitting Diagnosis:  Weakness    General Precautions: Standard, fall, vision impaired   Orthopedic Precautions: N/A   Braces: N/A    Recommendations:     Discharge Recommendations:  home health OT  Level of Assistance Recommended at Discharge: 24 hours physical assistance for all ADL's and home management tasks  Discharge Equipment Recommendations: wheelchair, 3-in-1 commode  Barriers to discharge:   (increased A needed for mobility)    Assessment:     Dimitri Johnson is a 89 y.o. male with a medical diagnosis of Weakness .  He presents with the following performance deficits affecting function are weakness, impaired endurance, impaired self care skills, impaired functional mobility, gait instability, decreased lower extremity function, decreased safety awareness, impaired cognition, impaired coordination, decreased ROM, impaired skin.  Pt was agreeable and participated in OT session.  Pt worked on ADLS, UE therex (to improve UE strength/endurance for improved func mobility), and func mobility.  Pt was noted to be confused on how to carry out tasks and required increased verbal cues and instructions prior to participating in activities.  Pt was noted to make progress towards his goals in therapy and required min A with LB dressing and min A with func mobility.  Pt's goals remain appropriate at this time.  He will continue to benefit from skilled OT services in order to assist him with increasing his safety and level of independence with self care and mobility tasks.       Rehab Potential is good    Activity tolerance:  Good    Plan:     Patient to be seen 5 x/week to address the above listed problems via self-care/home management, therapeutic activities, therapeutic exercises    Plan of Care Expires: 01/25/24  Plan of Care Reviewed with: patient    Subjective     Communicated with: nurse prior to session.   "What am I working " "on when I use this?"  Pt talking about UBE .    Pain/Comfort:  Pain Rating 1: 0/10  Pain Rating Post-Intervention 1: 0/10    Patient's cultural, spiritual, Rastafari conflicts given the current situation:  no    Objective:     Patient found HOB elevated with gray catheter upon OT entry to room.    Bed Mobility:    Patient completed Scooting/Bridging with supervision and stand by assistance  Patient completed Supine to Sit with stand by assistance     Functional Mobility/Transfers:  Patient completed Sit <> Stand Transfer with minimum assistance  with  rolling walker   Patient completed Bed <> Chair Transfer using Step Transfer technique with minimum assistance with rolling walker  Functional Mobility:   Pt required verbal cues on each step of transfer (scoot to EOB, push up to stand, hold onto RW, etc.)  At end of session, pt used B UEs to propel w/c from gym to room - needed physical assist to make turns and navigate due to visual impairments, but able to identify large obstacles in hallway    Activities of Daily Living:  Grooming: set up A to wash face with cloth  Lower Body Dressing: minimum assistance to naseem pants - needed A to insert cathether into pant leg but able to insert B LEs into pants with cues - min A to pull pants up to waist as pt maintained R hand hold on RW when standing to pull up to waist.     [facility] 6 Click ADL: 16    OT Exercises: UE Ergometer 15 min with light resistance - resistance increased to moderate for last 5 minutes per pt request - only brief stops to scratch head and nose - pt completed this task to improve UE strength/endurance for improved func mobility skills    Treatment & Education:  Pt completed ADLs, UE therex, and func mobility activities for tx session as noted above  Whiteboard updated  Pt educated on role of OT and POC      Patient left up in chair with all lines intact and call button in reach    GOALS:   Multidisciplinary Problems       Occupational Therapy Goals       "    Problem: Occupational Therapy    Goal Priority Disciplines Outcome Interventions   Occupational Therapy Goal     OT, PT/OT Ongoing, Progressing    Description: Goals to be met by: 1/17/2024     Patient will increase functional independence with ADLs by performing:    UE Dressing with Modified [place].  LE Dressing with Stand-by Assistance.  Grooming while seated at sink with Modified [place].  Toileting from toilet with Stand-by Assistance for hygiene and clothing management.   Bathing from sitting/standing at sink with Stand-by Assistance.  Supine to sit with Modified [place].  Step transfer with Stand-by Assistance with RW.  Toilet transfer to toilet with Stand-by Assistance with RW.                         Time Tracking:     OT Date of Treatment: 12/30/23  OT Start Time: 1111    OT Stop Time: 1158  OT Total Time (min): 47 min    Billable Minutes:Self Care/Home Management 15  Therapeutic Activity 15  Therapeutic Exercise 17    12/30/2023

## 2023-12-30 NOTE — PLAN OF CARE
Problem: Adult Inpatient Plan of Care  Goal: Plan of Care Review  Outcome: Ongoing, Progressing  Flowsheets (Taken 12/30/2023 0207)  Plan of Care Reviewed With: patient  Goal: Patient-Specific Goal (Individualized)  Outcome: Ongoing, Progressing  Goal: Absence of Hospital-Acquired Illness or Injury  Outcome: Ongoing, Progressing  Goal: Optimal Comfort and Wellbeing  Outcome: Ongoing, Progressing  Goal: Readiness for Transition of Care  Outcome: Ongoing, Progressing     Problem: Fall Injury Risk  Goal: Absence of Fall and Fall-Related Injury  Outcome: Ongoing, Progressing     Problem: Skin Injury Risk Increased  Goal: Skin Health and Integrity  Intervention: Optimize Skin Protection  Flowsheets (Taken 12/30/2023 0207)  Pressure Reduction Techniques: frequent weight shift encouraged  Pressure Reduction Devices: positioning supports utilized  Skin Protection: incontinence pads utilized  Head of Bed (HOB) Positioning: HOB at 30-45 degrees     Problem: Impaired Wound Healing  Goal: Optimal Wound Healing  Outcome: Ongoing, Progressing

## 2023-12-30 NOTE — NURSING
Ramirez catheter placed per verbal order, pt tolerated well. Safety precautions maintained, pt remains fall/ injury free throughout shift.

## 2023-12-31 PROCEDURE — 11000004 HC SNF PRIVATE

## 2023-12-31 PROCEDURE — 25000003 PHARM REV CODE 250: Performed by: HOSPITALIST

## 2023-12-31 PROCEDURE — 25000003 PHARM REV CODE 250: Performed by: FAMILY MEDICINE

## 2023-12-31 PROCEDURE — 63600175 PHARM REV CODE 636 W HCPCS: Performed by: FAMILY MEDICINE

## 2023-12-31 RX ADMIN — Medication 500 MG: at 08:12

## 2023-12-31 RX ADMIN — TAMSULOSIN HYDROCHLORIDE 0.4 MG: 0.4 CAPSULE ORAL at 08:12

## 2023-12-31 RX ADMIN — MIRTAZAPINE 7.5 MG: 7.5 TABLET, FILM COATED ORAL at 09:12

## 2023-12-31 RX ADMIN — Medication 500 MG: at 09:12

## 2023-12-31 RX ADMIN — ZINC SULFATE 220 MG (50 MG) CAPSULE 220 MG: CAPSULE at 08:12

## 2023-12-31 RX ADMIN — Medication 2 TABLET: at 08:12

## 2023-12-31 RX ADMIN — DOXYCYCLINE HYCLATE 100 MG: 100 TABLET, COATED ORAL at 06:12

## 2023-12-31 RX ADMIN — Medication 6 MG: at 09:12

## 2023-12-31 RX ADMIN — ENOXAPARIN SODIUM 30 MG: 30 INJECTION SUBCUTANEOUS at 06:12

## 2023-12-31 RX ADMIN — CALCIUM CARBONATE (ANTACID) CHEW TAB 500 MG 500 MG: 500 CHEW TAB at 08:12

## 2023-12-31 RX ADMIN — SENNOSIDES AND DOCUSATE SODIUM 1 TABLET: 8.6; 5 TABLET ORAL at 09:12

## 2023-12-31 RX ADMIN — POLYETHYLENE GLYCOL 3350 17 G: 17 POWDER, FOR SOLUTION ORAL at 08:12

## 2023-12-31 RX ADMIN — FINASTERIDE 5 MG: 5 TABLET, FILM COATED ORAL at 08:12

## 2023-12-31 RX ADMIN — HYDROCODONE BITARTRATE AND ACETAMINOPHEN 1 TABLET: 5; 325 TABLET ORAL at 08:12

## 2023-12-31 RX ADMIN — BISACODYL 10 MG: 10 SUPPOSITORY RECTAL at 09:12

## 2023-12-31 RX ADMIN — SENNOSIDES AND DOCUSATE SODIUM 1 TABLET: 8.6; 5 TABLET ORAL at 08:12

## 2023-12-31 RX ADMIN — AMLODIPINE BESYLATE 2.5 MG: 2.5 TABLET ORAL at 08:12

## 2023-12-31 RX ADMIN — ASPIRIN 81 MG CHEWABLE TABLET 81 MG: 81 TABLET CHEWABLE at 08:12

## 2023-12-31 NOTE — PLAN OF CARE
Problem: Adult Inpatient Plan of Care  Goal: Plan of Care Review  Outcome: Ongoing, Progressing  Flowsheets (Taken 12/31/2023 3643)  Plan of Care Reviewed With: patient  Goal: Patient-Specific Goal (Individualized)  Outcome: Ongoing, Progressing  Goal: Absence of Hospital-Acquired Illness or Injury  Outcome: Ongoing, Progressing  Goal: Optimal Comfort and Wellbeing  Outcome: Ongoing, Progressing  Goal: Readiness for Transition of Care  Outcome: Ongoing, Progressing     Problem: Fall Injury Risk  Goal: Absence of Fall and Fall-Related Injury  Outcome: Ongoing, Progressing     Problem: Skin Injury Risk Increased  Goal: Skin Health and Integrity  Outcome: Ongoing, Progressing     Problem: Impaired Wound Healing  Goal: Optimal Wound Healing  Outcome: Ongoing, Progressing     Problem: Infection  Goal: Absence of Infection Signs and Symptoms  Outcome: Ongoing, Progressing

## 2023-12-31 NOTE — H&P
"[facility] Medicine  Skilled Nursing Facility   History and Physical Exam      Date of Service: 12/31/2023      Patient Name: Dimitri Johnson  MRN: [id number]  Admission Date: 12/26/2023  Attending Physician: Kang Green MD  Primary Care Provider: Everette Neville MD  Code Status: Full Code      Principal problem:  Weakness      Chief Complaint:   Chief Complaint   Patient presents with    Weakness     Admitted to OS for rehabilitation following hospital stay for generalized weakness.           HPI:   "Dimitri Johnson is a 89 y.o. male admitted to [facility] 12/22 with weakness and constipation. He had associated stomach discomfort that was relieved after having a large bowel movement. Daily miralax and prn dulcolax regimen started. Patient has decreased mobility due to his degenerative disc disease and deconditioning. He is hesitant to walk and stand up. He worked with PT and OT who both recommended moderate intensity therapy.      Patient will be treated at [facility] with PT and OT to improve functional status and ability to perform ADLs.      He is sitting up in bed with complaint of RLE tightness different from usual muscle spasm.  No edema or redness noted.  Robaxin 4 times daily ordered for muscle spasms, BLE venous Doppler to rule out DVT.  He states he is chair bound at baseline and has a lift chair for his two-story house where he lives with his children.  Hospital records indicate that he does ambulate with walker at home. He is blind in the right eye and poor vision in the left, hard of hearing." Per Camilla Gonzalez, NP on 12/27/23.     He is doing okay today. He is eating his lunch and eating well. Denies any nausea or abdominal pain. He denies any difficulty with the gray. He is not sure on his last BM, but there is one noted yesterday. He is working well with therapy and walked 120 feet x 2 yesterday with PT.     Patient admitted with skilled services with PT and OT to improve functional status and " ability to perform ADLs.       Past Medical History:   Past Medical History:   Diagnosis Date    Glaucoma     Hypertension        Past Surgical History:   Past Surgical History:   Procedure Laterality Date    CORNEAL TRANSPLANT Right     CORNEAL TRANSPLANT Left 7/17/14    DSEK       Social History:   Tobacco Use    Smoking status: Never    Smokeless tobacco: Not on file   Substance and Sexual Activity    Alcohol use: Yes     Comment: once a month    Drug use: Not on file    Sexual activity: Not on file       Family History:   Family History    None         No current facility-administered medications on file prior to encounter.     Current Outpatient Medications on File Prior to Encounter   Medication Sig    amlodipine (NORVASC) 5 MG tablet Take 5 mg by mouth 2 (two) times daily.    aspirin 81 MG Chew Take 81 mg by mouth once daily.    bisacodyL (DULCOLAX) 10 mg Supp Place 1 suppository (10 mg total) rectally daily as needed (constipation).    finasteride (PROSCAR) 5 mg tablet Take 5 mg by mouth once daily.    polyethylene glycol (GLYCOLAX) 17 gram PwPk Take 17 g by mouth once daily.    tamsulosin (FLOMAX) 0.4 mg Cap Take 1 capsule (0.4 mg total) by mouth once daily.       Allergies:   Review of patient's allergies indicates:   Allergen Reactions    Pcn [penicillins]        ROS:  Review of Systems   Constitutional:  Negative for appetite change and fever.   Respiratory:  Negative for cough and shortness of breath.    Cardiovascular:  Negative for chest pain and palpitations.   Gastrointestinal:  Negative for abdominal pain, nausea and vomiting.   Genitourinary:  Positive for difficulty urinating. Negative for dysuria.   Musculoskeletal:  Negative for arthralgias and back pain.   Neurological:  Positive for weakness. Negative for dizziness and light-headedness.   Psychiatric/Behavioral:  Negative for sleep disturbance. The patient is not nervous/anxious.      Objective:  Temp:  [98.2 °F (36.8 °C)]   Pulse:  [87]    Resp:  [16-18]   BP: (118-172)/(58-76)   SpO2:  [97 %]     Body mass index is 18.08 kg/m².      Physical Exam  Vitals and nursing note reviewed.   Constitutional:       General: He is not in acute distress.     Appearance: He is well-developed. He is not diaphoretic.   Cardiovascular:      Rate and Rhythm: Normal rate and regular rhythm.      Heart sounds: S1 normal and S2 normal. No murmur heard.  Pulmonary:      Effort: Pulmonary effort is normal. No respiratory distress.      Breath sounds: Normal breath sounds. No wheezing or rales.   Abdominal:      General: Bowel sounds are normal. There is no distension.      Palpations: Abdomen is soft.      Tenderness: There is no abdominal tenderness.   Genitourinary:     Comments: Ramirez in place  Musculoskeletal:      Right lower leg: No edema.      Left lower leg: No edema.   Skin:     General: Skin is warm and dry.   Neurological:      Mental Status: He is alert and oriented to person, place, and time.   Psychiatric:         Mood and Affect: Mood normal.         Behavior: Behavior normal. Behavior is cooperative.         Thought Content: Thought content normal.       Significant Labs:   TSH:   Recent Labs   Lab 12/23/23  1235   TSH 8.775*     BMP  Lab Results   Component Value Date     (L) 12/28/2023    K 4.5 12/28/2023     12/28/2023    CO2 23 12/28/2023    BUN 28 (H) 12/28/2023    CREATININE 1.1 12/28/2023    CALCIUM 8.4 (L) 12/28/2023    ANIONGAP 7 (L) 12/28/2023    EGFRNORACEVR >60.0 12/28/2023       LFTS:  Lab Results   Component Value Date    ALT 16 12/25/2023    AST 22 12/25/2023    ALKPHOS 67 12/25/2023    BILITOT 0.6 12/25/2023       CBC:  Lab Results   Component Value Date    WBC 11.27 12/28/2023    HGB 13.9 (L) 12/28/2023    HCT 40.0 12/28/2023    MCV 94 12/28/2023     12/28/2023     Significant Imaging: I have reviewed all pertinent imaging results/findings completed during prior hospitalization.      Assessment and Plan:  Active  Diagnoses:    Diagnosis Date Noted POA    PRINCIPAL PROBLEM:  Weakness [R53.1] 12/23/2023 Yes    Severe malnutrition [E43] 12/29/2023 Yes    DDD (degenerative disc disease), thoracolumbar [M51.35] 12/27/2023 Yes    Urinary retention [R33.9] 12/23/2023 Yes    Primary hypertension [I10] 12/22/2023 Yes    Constipation [K59.00] 12/22/2023 Yes    Blindness, one eye [H54.40] 06/11/2014 Yes      Problems Resolved During this Admission:       Weakness  Debility  Patient uses walker at baseline. His son reported  prior to admit he was unable to get off of toilet and ambulate.   B12 WNL TSH 8.7 but T4 WNL  PT/OT  Fall precaution  Lovenox 30mg daily for VTE ppx    Constipation  CT A/P (12/22/23): Limited examination. Moderately large liquid stool within the patulous rectum.  Question possible diarrheal illness and/or fecal impaction. Left renal cyst. Prostatomegaly. Osseous changes particularly of the spine.  Continue miralax daily and dulcolax suppository daily  Encourge oral intake  Avoid narcotics  LBM 12/30  KUB (12/29/23): No constipation.     Degenerative disc disease  Osteoporosis  Muscle spasms  Continue Robaxin QID PRN, ibuprofen p.r.n. Q 8 hrs, and Norco 5 mg q8h PRN     Hyponatremia  Hypocalcemia  Dehydration  Encourage oral intake  Monitor on routine lab  Continue calcium with vitamin-D     Severe malnutrition  Poor appetite  previous weight unknown  RD following  Remeron started  BMI 18   Assist with meals due to vision disturbance  Eating well today.      Right lower extremity Baker's cyst  Noted on venous ultrasound  Continue ibuprofen prn pain, ice  Ambulatory referral to Orthopedics     Insomnia  Continue melatonin and Remeron 7.5 mg nightly     Urinary retention  Acute cystitis with CONS  Gray placed  in ED for retention, discontinued prior to discharge.  Urine culture with CON Staph.  Continue vitamin C, finasteride  Failed void trial and gray replaced. Urology f/u outpatient  Continue doxycycline 100 mg  BID for 7 days (End 1/6/24).      Moisture associated dermatitis sacrum  Continue Triad BID  Wound care nurse to follow.   Continue Vitamin-C and zinc     Primary hypertension  Home regimen is amlodipine 5 mg BID.  Continue decreased amlodipine at 2.5 mg daily      IP [facility] RISK OF UNPLANNED READMISSION Model: Low      Anticipated Disposition:  Home with home health      No future appointments.    I certify that SNF services are required to be given on an inpatient basis because Dimitri Johnson needs for skilled nursing care and/or skilled rehabilitation are required on a daily basis and such services can only practically be provided in a skilled nursing facility setting and are for an ongoing condition for which she received inpatient care in the hospital.       Kang Green MD  Department of Hospital Medicine   [facility] - Skilled Nursing

## 2024-01-01 PROCEDURE — 63600175 PHARM REV CODE 636 W HCPCS: Performed by: FAMILY MEDICINE

## 2024-01-01 PROCEDURE — 25000003 PHARM REV CODE 250: Performed by: FAMILY MEDICINE

## 2024-01-01 PROCEDURE — 25000003 PHARM REV CODE 250: Performed by: HOSPITALIST

## 2024-01-01 PROCEDURE — 11000004 HC SNF PRIVATE

## 2024-01-01 RX ORDER — LACTULOSE 10 G/15ML
30 SOLUTION ORAL ONCE
Status: COMPLETED | OUTPATIENT
Start: 2024-01-01 | End: 2024-01-01

## 2024-01-01 RX ADMIN — METHOCARBAMOL 500 MG: 500 TABLET ORAL at 05:01

## 2024-01-01 RX ADMIN — HYDROCODONE BITARTRATE AND ACETAMINOPHEN 1 TABLET: 5; 325 TABLET ORAL at 11:01

## 2024-01-01 RX ADMIN — ZINC SULFATE 220 MG (50 MG) CAPSULE 220 MG: CAPSULE at 10:01

## 2024-01-01 RX ADMIN — SENNOSIDES AND DOCUSATE SODIUM 1 TABLET: 8.6; 5 TABLET ORAL at 08:01

## 2024-01-01 RX ADMIN — METHOCARBAMOL 500 MG: 500 TABLET ORAL at 08:01

## 2024-01-01 RX ADMIN — METHOCARBAMOL 500 MG: 500 TABLET ORAL at 11:01

## 2024-01-01 RX ADMIN — AMLODIPINE BESYLATE 2.5 MG: 2.5 TABLET ORAL at 10:01

## 2024-01-01 RX ADMIN — TAMSULOSIN HYDROCHLORIDE 0.4 MG: 0.4 CAPSULE ORAL at 10:01

## 2024-01-01 RX ADMIN — FINASTERIDE 5 MG: 5 TABLET, FILM COATED ORAL at 10:01

## 2024-01-01 RX ADMIN — LACTULOSE 30 G: 20 SOLUTION ORAL at 06:01

## 2024-01-01 RX ADMIN — Medication 500 MG: at 08:01

## 2024-01-01 RX ADMIN — ENOXAPARIN SODIUM 30 MG: 30 INJECTION SUBCUTANEOUS at 06:01

## 2024-01-01 RX ADMIN — Medication 2 TABLET: at 10:01

## 2024-01-01 RX ADMIN — SENNOSIDES AND DOCUSATE SODIUM 1 TABLET: 8.6; 5 TABLET ORAL at 10:01

## 2024-01-01 RX ADMIN — ASPIRIN 81 MG CHEWABLE TABLET 81 MG: 81 TABLET CHEWABLE at 10:01

## 2024-01-01 RX ADMIN — MIRTAZAPINE 7.5 MG: 7.5 TABLET, FILM COATED ORAL at 08:01

## 2024-01-01 RX ADMIN — POLYETHYLENE GLYCOL 3350 17 G: 17 POWDER, FOR SOLUTION ORAL at 09:01

## 2024-01-01 RX ADMIN — Medication 6 MG: at 08:01

## 2024-01-01 RX ADMIN — DOXYCYCLINE HYCLATE 100 MG: 100 TABLET, COATED ORAL at 06:01

## 2024-01-01 NOTE — PROGRESS NOTES
[facility]                                  Skilled Nursing Facility                   Progress Note     Patient Name: Dimitri Johnson  YOB: 1933  MRN: [id number]  Room: [place] [id number] A     Admit Date: 12/26/2023   PALOMO: 1/16/2024     Principal Problem:  Weakness    HPI obtained from patient interview and chart review     Chief Complaint: Re-evaluation of medical treatment and therapy status, urinary retention, malnutrition    HPI:   Dimitri Johnson is a 89 y.o. male admitted to [facility] 12/22 with weakness and constipation. He had associated stomach discomfort that was relieved after having a large bowel movement. Daily miralax and prn dulcolax regimen started. Patient has decreased mobility due to his degenerative disc disease and deconditioning. He is hesitant to walk and stand up. He worked with PT and OT who both recommended moderate intensity therapy.     Patient will be treated at [facility] with PT and OT to improve functional status and ability to perform ADLs.     Interval history:  24 hour chart review completed. Pertinent lab, micro, and/or radiology results addressed below. NAEON. NAD.   Vitals: Afebrile, hemodynamically stable.  I/O: Patient reports fair appetite. Continue Remeron, supplements, assist with meals due to blindness. LBM 12/28. Abdomen soft, ND, NT. Lactulose 30 g x 1. Ramirez output CYU. Will need urology f/u outpatient for retention and failed void trial. UA without nitrites or bacteria. U cx without susceptibility. Norco 5 mg Q 8 H p.r.n., doxycycline x5 days, continue finasteride.  Skilled Therapy: Patient progressing with therapy as tolerated and would continue to benefit from skilled PT and OT services to improve overall functional mobility and independence, strength, endurance, and safety.        Past Medical History: Patient has a past medical history of Glaucoma and Hypertension. Osteoporosis,  macular hole, urinary retention, degenerative  disc disease    Past Surgical History: Patient has a past surgical history that includes Corneal transplant (Right) and Corneal transplant (Left, 7/17/14).    Social History: Patient reports that he has never smoked. He does not have any smokeless tobacco history on file. He reports current alcohol use.    Family History:  non contributory    Allergies: Patient is allergic to pcn [penicillins].        ROS  Constitutional:  Negative for fever.  Positive forgetfulness  HENT:  Negative for sore throat.    Eyes:  Negative for redness.   Respiratory:  Negative for shortness of breath.    Cardiovascular:  Negative for chest pain.   Gastrointestinal:  Negative for nausea.   Genitourinary:  Negative for dysuria.   Musculoskeletal:  Negative for back pain.    Skin:  Negative for rash.   Neurological:  Positive for weakness.   Hematological:  Does not bruise/bleed easily.   Psychiatric/Behavioral: Negative insomnia      24 hour Vital Sign Range   Temp:  [98.2 °F (36.8 °C)-98.7 °F (37.1 °C)]   Pulse:  [79-84]   Resp:  [14-18]   BP: (117-136)/(57-64)   SpO2:  [96 %]       Physical Exam  General: Alert, frail, thin, oriented x4 forgetful at times  HENT: Atraumatic, normocephalic, Right eye keteroplasty/blind, left eye poor vision, hard of hearing  CV: RRR; no murmurs, Normal s1, s2, no peripheral edema  Resp: CTAB with normal work of breathing and chest excursion on room air.   Abd: Soft, NTND. Bowel sounds normoactive  MSK: Generalized weakness  : Mixed incontinence  Neuro: Normal appearing coordination and sensory function.   Skin: No rashes or lesions noted, dry, warm, dressing to left ankle,  dermatitis sacrum  Psych: calm, cooperative, yells out at time         Labs:  Recent Labs   Lab 12/26/23  0354 12/28/23  0556   WBC 12.00 11.27   HGB 13.6* 13.9*   HCT 39.9* 40.0    325       Recent Labs   Lab 12/26/23  0354 12/28/23  0556    135*   K 4.3 4.5    105  "  CO2 21* 23   BUN 23 28*   CREATININE 1.0 1.1    99   CALCIUM 9.0 8.4*   MG  --  2.0   PHOS  --  3.4       No results for input(s): "ALKPHOS", "ALT", "AST", "ALBUMIN", "PROT", "BILITOT", "INR" in the last 168 hours.    No results for input(s): "POCTGLUCOSE" in the last 72 hours.    Meds Scheduled:   amLODIPine  2.5 mg Oral Daily    ascorbic acid (vitamin C)  500 mg Oral BID    aspirin  81 mg Oral Daily    calcium-vitamin D3  2 tablet Oral Daily    doxycycline  100 mg Oral Q12H    enoxparin  30 mg Subcutaneous Q24H (prophylaxis, 1700)    finasteride  5 mg Oral Daily    melatonin  6 mg Oral Nightly    mirtazapine  7.5 mg Oral QHS    polyethylene glycol  17 g Oral Daily    senna-docusate 8.6-50 mg  1 tablet Oral BID    tamsulosin  0.4 mg Oral Daily    zinc sulfate  220 mg Oral Daily       PRN:   acetaminophen, bisacodyL, calcium carbonate, HYDROcodone-acetaminophen, methocarbamoL      Assessment and Plan:    Constipation  CT- Limited examination. Moderately large liquid stool within the patulous rectum.  Question possible diarrheal illness and/or fecal impaction.  Left renal cyst.     Prostatomegaly.  Osseous changes particularly of the spine.  Miralax daily, Dulcolax daily  Encourge oral intake  Avoid narcotics  LBM 12/28  -12/29 KUB no constipation  -1/1 still no BM, pt prefers laxative, give Lactulose 30 g x 1     Degenerative disc disease  Osteoporosis  Muscle spasms   Continue Robaxin QID PRN, ibuprofen p.r.n. Q 8 H x3 days  -12/29:  Initiate Norco 5 mg q8h PRN    Hyponatremia  Hypocalcemia  Dehydration  Encourage oral intake  Monitor on routine lab  Calcium with vitamin-D    Severe malnutrition  Poor appetite  previous weight unknown  RD following  Remeron started  BMI 18   Assist with meals due to vision disturbance    Right lower extremity Baker's cyst  Noted on venous ultrasound  Continue ibuprofen prn pain, ice  Ambulatory referral to orthopedics    Insomnia  Scheduled melatonin  Remeron 7.5 mg " nightly     Urinary retention  Ramirez placed  in ED for retention, discontinued prior to discharge.   Bladder scan q.shift x 3days   Urine culture with staph however previous culture negative, asymptomatic   vitamin C, finasteride  -12/29 Failed void trial, Ramirez replaced. Urology f/u outpatient     Weakness  Debility  Patient uses walker at baseline. His son reported  prior to admit he was unable to get off of toilet and ambulate.    B12 WNL TSH 8.7 but T4 WNL   PT/OT consult   Fall precaution  Lovenox 30mg daily for VTE ppx    Moisture associated dermatitis sacrum   Triad BID   Consult wound care   Vitamin-C and zinc     Primary hypertension  Home Norvasc 5 mg b.i.d.  Decrease Norvasc to 2.5 mg daily    Anticipate disposition:    Home with home health    IP [facility] RISK OF UNPLANNED READMISSION Model: LOW      Follow-up needed during SNF admission:   None    Follow-up needed after discharge from SNF:   - PCP within 1-2 weeks  - See appt scheduled below     No future appointments.      I certify that SNF services are required to be given on an inpatient basis because Dimitri Johnson needs for skilled nursing care and/or skilled rehabilitation are required on a daily basis and such services can only practically be provided in a skilled nursing facility setting and are for an ongoing condition for which she received inpatient care in the hospital.       Extended Visit:   Total time spent: 32 minutes  Description of Time: counseling provided on clinical condition, therapies provided, plan of care, emotional support, coordinating patient care with other care team members, reviewing and interpreting labs and imaging, collaboration with physician, initiating new orders, chart review, and documentation. See interval hx.       Nunu Suárez NP  Department of Hospital Medicine   [facility]- Skilled Nursing Facility     DOS: 1/1/2024       Patient note was created using MModal Dictation.  Any errors in syntax or even  information may not have been identified and edited on initial review prior to signing this note.

## 2024-01-01 NOTE — PLAN OF CARE
Problem: Adult Inpatient Plan of Care  Goal: Plan of Care Review  Outcome: Ongoing, Progressing  Flowsheets (Taken 1/1/2024 0157)  Plan of Care Reviewed With: patient  Goal: Patient-Specific Goal (Individualized)  Outcome: Ongoing, Progressing  Goal: Absence of Hospital-Acquired Illness or Injury  Outcome: Ongoing, Progressing  Goal: Optimal Comfort and Wellbeing  Outcome: Ongoing, Progressing  Goal: Readiness for Transition of Care  Outcome: Ongoing, Progressing     Problem: Fall Injury Risk  Goal: Absence of Fall and Fall-Related Injury  Outcome: Ongoing, Progressing  Intervention: Identify and Manage Contributors  Flowsheets (Taken 1/1/2024 0157)  Self-Care Promotion:   independence encouraged   BADL personal objects within reach   safe use of adaptive equipment encouraged  Medication Review/Management: medications reviewed     Problem: Skin Injury Risk Increased  Goal: Skin Health and Integrity  Outcome: Ongoing, Progressing     Problem: Impaired Wound Healing  Goal: Optimal Wound Healing  Outcome: Ongoing, Progressing     Problem: Infection  Goal: Absence of Infection Signs and Symptoms  Outcome: Ongoing, Progressing

## 2024-01-02 LAB
ANION GAP SERPL CALC-SCNC: 9 MMOL/L (ref 8–16)
BASOPHILS # BLD AUTO: 0.09 K/UL (ref 0–0.2)
BASOPHILS NFR BLD: 1 % (ref 0–1.9)
BUN SERPL-MCNC: 33 MG/DL (ref 8–23)
CALCIUM SERPL-MCNC: 9.2 MG/DL (ref 8.7–10.5)
CHLORIDE SERPL-SCNC: 105 MMOL/L (ref 95–110)
CO2 SERPL-SCNC: 25 MMOL/L (ref 23–29)
CREAT SERPL-MCNC: 1.2 MG/DL (ref 0.5–1.4)
DIFFERENTIAL METHOD BLD: ABNORMAL
EOSINOPHIL # BLD AUTO: 0.4 K/UL (ref 0–0.5)
EOSINOPHIL NFR BLD: 3.8 % (ref 0–8)
ERYTHROCYTE [DISTWIDTH] IN BLOOD BY AUTOMATED COUNT: 13.4 % (ref 11.5–14.5)
EST. GFR  (NO RACE VARIABLE): 57.4 ML/MIN/1.73 M^2
GLUCOSE SERPL-MCNC: 76 MG/DL (ref 70–110)
HCT VFR BLD AUTO: 42.7 % (ref 40–54)
HGB BLD-MCNC: 14 G/DL (ref 14–18)
IMM GRANULOCYTES # BLD AUTO: 0.03 K/UL (ref 0–0.04)
IMM GRANULOCYTES NFR BLD AUTO: 0.3 % (ref 0–0.5)
LYMPHOCYTES # BLD AUTO: 2.1 K/UL (ref 1–4.8)
LYMPHOCYTES NFR BLD: 22.1 % (ref 18–48)
MAGNESIUM SERPL-MCNC: 2 MG/DL (ref 1.6–2.6)
MCH RBC QN AUTO: 31.4 PG (ref 27–31)
MCHC RBC AUTO-ENTMCNC: 32.8 G/DL (ref 32–36)
MCV RBC AUTO: 96 FL (ref 82–98)
MONOCYTES # BLD AUTO: 1 K/UL (ref 0.3–1)
MONOCYTES NFR BLD: 10.9 % (ref 4–15)
NEUTROPHILS # BLD AUTO: 5.9 K/UL (ref 1.8–7.7)
NEUTROPHILS NFR BLD: 61.9 % (ref 38–73)
NRBC BLD-RTO: 0 /100 WBC
PHOSPHATE SERPL-MCNC: 3.6 MG/DL (ref 2.7–4.5)
PLATELET # BLD AUTO: 340 K/UL (ref 150–450)
PMV BLD AUTO: 11.1 FL (ref 9.2–12.9)
POTASSIUM SERPL-SCNC: 4.7 MMOL/L (ref 3.5–5.1)
RBC # BLD AUTO: 4.46 M/UL (ref 4.6–6.2)
SODIUM SERPL-SCNC: 139 MMOL/L (ref 136–145)
WBC # BLD AUTO: 9.46 K/UL (ref 3.9–12.7)

## 2024-01-02 PROCEDURE — 36415 COLL VENOUS BLD VENIPUNCTURE: CPT | Performed by: HOSPITALIST

## 2024-01-02 PROCEDURE — 11000004 HC SNF PRIVATE

## 2024-01-02 PROCEDURE — 25000003 PHARM REV CODE 250: Performed by: FAMILY MEDICINE

## 2024-01-02 PROCEDURE — 85025 COMPLETE CBC W/AUTO DIFF WBC: CPT | Performed by: HOSPITALIST

## 2024-01-02 PROCEDURE — 97535 SELF CARE MNGMENT TRAINING: CPT | Mod: CO

## 2024-01-02 PROCEDURE — 97110 THERAPEUTIC EXERCISES: CPT | Mod: CO

## 2024-01-02 PROCEDURE — 94761 N-INVAS EAR/PLS OXIMETRY MLT: CPT

## 2024-01-02 PROCEDURE — 63600175 PHARM REV CODE 636 W HCPCS: Performed by: FAMILY MEDICINE

## 2024-01-02 PROCEDURE — 25000003 PHARM REV CODE 250: Performed by: HOSPITALIST

## 2024-01-02 PROCEDURE — 97116 GAIT TRAINING THERAPY: CPT | Mod: CQ

## 2024-01-02 PROCEDURE — 92610 EVALUATE SWALLOWING FUNCTION: CPT

## 2024-01-02 PROCEDURE — 80048 BASIC METABOLIC PNL TOTAL CA: CPT | Performed by: HOSPITALIST

## 2024-01-02 PROCEDURE — 83735 ASSAY OF MAGNESIUM: CPT | Performed by: HOSPITALIST

## 2024-01-02 PROCEDURE — 84100 ASSAY OF PHOSPHORUS: CPT | Performed by: HOSPITALIST

## 2024-01-02 PROCEDURE — 97110 THERAPEUTIC EXERCISES: CPT | Mod: CQ

## 2024-01-02 RX ADMIN — Medication 2 TABLET: at 09:01

## 2024-01-02 RX ADMIN — ZINC SULFATE 220 MG (50 MG) CAPSULE 220 MG: CAPSULE at 08:01

## 2024-01-02 RX ADMIN — Medication 500 MG: at 08:01

## 2024-01-02 RX ADMIN — POLYETHYLENE GLYCOL 3350 17 G: 17 POWDER, FOR SOLUTION ORAL at 08:01

## 2024-01-02 RX ADMIN — ASPIRIN 81 MG CHEWABLE TABLET 81 MG: 81 TABLET CHEWABLE at 08:01

## 2024-01-02 RX ADMIN — TAMSULOSIN HYDROCHLORIDE 0.4 MG: 0.4 CAPSULE ORAL at 08:01

## 2024-01-02 RX ADMIN — DOXYCYCLINE HYCLATE 100 MG: 100 TABLET, COATED ORAL at 08:01

## 2024-01-02 RX ADMIN — AMLODIPINE BESYLATE 2.5 MG: 2.5 TABLET ORAL at 08:01

## 2024-01-02 RX ADMIN — FINASTERIDE 5 MG: 5 TABLET, FILM COATED ORAL at 08:01

## 2024-01-02 RX ADMIN — BISACODYL 10 MG: 10 SUPPOSITORY RECTAL at 05:01

## 2024-01-02 RX ADMIN — SENNOSIDES AND DOCUSATE SODIUM 1 TABLET: 8.6; 5 TABLET ORAL at 08:01

## 2024-01-02 RX ADMIN — Medication 6 MG: at 08:01

## 2024-01-02 RX ADMIN — ENOXAPARIN SODIUM 30 MG: 30 INJECTION SUBCUTANEOUS at 06:01

## 2024-01-02 RX ADMIN — MIRTAZAPINE 7.5 MG: 7.5 TABLET, FILM COATED ORAL at 08:01

## 2024-01-02 RX ADMIN — HYDROCODONE BITARTRATE AND ACETAMINOPHEN 1 TABLET: 5; 325 TABLET ORAL at 05:01

## 2024-01-02 RX ADMIN — DOXYCYCLINE HYCLATE 100 MG: 100 TABLET, COATED ORAL at 06:01

## 2024-01-02 NOTE — PT/OT/SLP PROGRESS
"Physical Therapy Treatment    Patient Name:  Dimitri Johnson   MRN:  [id number]  Admit Date: 12/26/2023  Admitting Diagnosis: Weakness  Recent Surgeries:     General Precautions: Standard, aspiration, fall, vision impaired  Orthopedic Precautions: N/A  Braces: N/A    Recommendations:     Discharge Recommendations: home health PT  Level of Assistance Recommended at Discharge: 24 hours significant assistance  Discharge Equipment Recommendations: 3-in-1 commode, wheelchair  Barriers to discharge: Decreased caregiver support    Assessment:     Dimitri Johnson is a 89 y.o. male admitted with a medical diagnosis of Weakness . Patient showed improved endurance and less fear when ambulating, which facilitated Patient's ability to ambulate.      Performance deficits affecting function: weakness, impaired endurance, impaired self care skills, impaired functional mobility, gait instability, impaired balance, decreased coordination, decreased safety awareness, decreased ROM.    Rehab Potential is good    Activity Tolerance: Good    Plan:     Patient to be seen 5 x/week to address the above listed problems via gait training, therapeutic activities, therapeutic exercises, neuromuscular re-education, wheelchair management/training    Plan of Care Expires: 01/26/24  Plan of Care Reviewed with: patient    Subjective     Patient states " I can pretend that I'm not scared when I walk, but  am really scared".     Pain/Comfort:  Pain Rating 1: 0/10  Pain Rating Post-Intervention 1: 0/10    Patient's cultural, spiritual, Sikh conflicts given the current situation:  no    Objective:     Communicated with NSG prior to session.  Patient found  with gray catheter upon PT entry to room.   up in chair  Therapeutic Activities and Exercises: LE Ergometer x 15 minutes with rest breaks. Educated on transfers from sit<>stand.    Functional Mobility:  Transfers:     Sit to Stand:  moderate assistance with rolling walker  Gait: 240 ft x 2 trials with RW " and initially with min assistance, but progressing to CGA, with W/C in tow.  Wheelchair Propulsion:  Pt propelled Standard wheelchair x 54 feet on Level tile with  Right upper extremity and Left upper extremity with Stand-by Assistance.     AM-PAC 6 CLICK MOBILITY  15    Patient left up in chair with all lines intact and call button in reach.    GOALS:   Multidisciplinary Problems       Physical Therapy Goals          Problem: Physical Therapy    Goal Priority Disciplines Outcome Goal Variances Interventions   Physical Therapy Goal     PT, PT/OT Ongoing, Progressing     Description: Goals to be met by: 24     Patient will increase functional independence with mobility by performin. Supine to sit with Supervision  2. Sit to supine with Supervision  3. Rolling to Left and Right with Supervision  4. Sit to stand transfer with Stand-by Assistance  5. Bed to chair transfer with Stand-by Assistance using Rolling Walker  6. Gait  x 150 feet with Stand-by Assistance using Rolling Walker  7. Wheelchair propulsion x 150 feet with Modified [place] using bilateral upper extremities  *Continue to assess living environment for steps to enter home; set goals as appropriate                         Time Tracking:     PT Received On: 24  PT Start Time: 1122  PT Stop Time: 1202  PT Total Time (min): 40 min    Billable Minutes: Gait Training 15 and Therapeutic Exercise 25    Treatment Type: Treatment  PT/PTA: PTA     Number of PTA visits since last PT visit: 3     2024

## 2024-01-02 NOTE — PT/OT/SLP EVAL
"Speech Language Pathology  Evaluation/Discharge    Dimitri Johnson   MRN: [id number]   Admitting Diagnosis: Weakness    Diet recommendations: Solid Diet Level: Regular Diet - IDDSI Level 7  Liquid Diet Level: Thin liquids - IDDSI Level 0 1 bite/sip at a time, Alternating bites/sips, Avoid talking while eating, HOB to 90 degrees, Meds whole 1 at a time - capsules may be opened and mixed in puree, Monitor for s/s of aspiration, Small bites/sips, and Standard aspiration precautions    SLP Treatment Date: 01/02/24  Speech Start Time: 0810     Speech Stop Time: 0822     Speech Total (min): 12 min       TREATMENT BILLABLE MINUTES:  Eval Swallow and Oral Function 12    Diagnosis: Weakness      Past Medical History:   Diagnosis Date    Glaucoma     Hypertension      Past Surgical History:   Procedure Laterality Date    CORNEAL TRANSPLANT Right     CORNEAL TRANSPLANT Left 7/17/14    DSEK       Has the patient been evaluated by SLP for swallowing? : Yes  Keep patient NPO?: No General Precautions: Standard, aspiration, fall, vision impaired          HPI:   "Dimitri Johnson is a 89 y.o. male [facility] with weakness and constipation. He had associated stomach discomfort that was relieved after having a large bowel movement. Daily miralax and prn dulcolax regimen started. Patient has decreased mobility due to his degenerative disc disease and deconditioning. He is hesitant to walk and stand up. He worked with PT and OT who both recommended moderate intensity therapy.      Patient will be treated at [facility] with PT and OT to improve functional status and ability to perform ADLs.      He is sitting up in bed with complaint of RLE tightness different from usual muscle spasm.  No edema or redness noted.  Robaxin 4 times daily ordered for muscle spasms, BLE venous Doppler to rule out DVT.  He states he is chair bound at baseline and has a lift chair for his two-story house where he lives with his children.  Hospital " "records indicate that he does ambulate with walker at home. He is blind in the right eye and poor vision in the left, hard of hearing." Per Camilla Gonzalez NP on 12/27/23.      He is doing okay today. He is eating his lunch and eating well. Denies any nausea or abdominal pain. He denies any difficulty with the gray. He is not sure on his last BM, but there is one noted yesterday. He is working well with therapy and walked 120 feet x 2 yesterday with PT.      Patient admitted with skilled services with PT and OT to improve functional status and ability to perform ADLs.          Prior diet: regular solids/thin liquids    Subjective:  "It might have just been a temporary thing." Pt stated when asked if he was having any swallowing difficulty prompting SLP consult.          Objective:        Oral Musculature Evaluation  Oral Musculature: WNL  Dentition: present and adequate  Secretion Management: adequate  Mucosal Quality: adequate  Mandibular Strength and Mobility: WNL  Oral Labial Strength and Mobility: WNL  Lingual Strength and Mobility: WNL  Velar Elevation: WNL  Buccal Strength and Mobility: WNL  Volitional Cough: strong  Volitional Swallow: elicited  Voice Prior to PO Intake: dry, clear, good volume and intensity     Bedside Swallow Eval:   Consistencies Assessed: Thin liquids 4oz orange juice via straw  Solids 1/4 cracker x 2 (multiple small bites)  Oral Phase: WFL  Pharyngeal Phase: no overt clinical signs/symptoms of aspiration  no overt clinical signs/symptoms of pharyngeal dysphagia    Additional Treatment:  Oral and pharyngeal phases of the swallow appear to be intact.  No overt s/s of aspiration observed.  SLP recommends pt remain on current diet with standard aspiration precautions. Nurse reports some difficulty swallowing capsules, but pt able to swallowing when contents of capsule are mixed in puree and administered. Pt able able to swallow smaller pills 1-2 at a time with liquid without difficulty.  No " further skilled SLP services warranted at this time.     Assessment:  Dimitri Johnson is a 89 y.o. male with a medical diagnosis of Weakness and presents with functional swallowing abilities.  No further skilled SLP services warranted at this time.         Discharge recommendations: Discharge Facility/Level of Care Needs:  (no further SLP needs)     Goals:   Multidisciplinary Problems       SLP Goals       Not on file                     Plan:   Patient to be seen    Planned Interventions:    Plan of Care expires:    Plan of Care reviewed with: patient  SLP Follow-up?: No                01/02/2024

## 2024-01-02 NOTE — PT/OT/SLP PROGRESS
"Occupational Therapy   Treatment    Name: Dimitri Johnson  MRN: [id number]  Admit Date: 12/26/2023  Admitting Diagnosis:  Weakness    General Precautions: Standard, fall, vision impaired   Orthopedic Precautions: N/A   Braces: N/A    Recommendations:     Discharge Recommendations:  home health OT  Level of Assistance Recommended at Discharge: 24 hours physical assistance for all ADL's and home management tasks  Discharge Equipment Recommendations: wheelchair, 3-in-1 commode  Barriers to discharge:   (increased A needed for mobility)    Assessment:     Dimitri Johnson is a 89 y.o. male with a medical diagnosis of Weakness.  He presents with limitations in performance of self-care, functional mobility, and ADLs. Performance deficits affecting function are weakness, impaired endurance, impaired self care skills, impaired functional mobility, gait instability, decreased lower extremity function, decreased safety awareness, impaired cognition, impaired coordination, decreased ROM, impaired skin. Pt tolerated Tx without incident, however required encouragement throughout Tx. Pt is making progress but continues to require assist to perform self care tasks, functional mobility and functional transfers. Pt would continue to benefit from OT intervention to further functional (I)ce and safety.     Rehab Potential is fair    Activity tolerance:  Fair    Plan:     Patient to be seen 5 x/week to address the above listed problems via self-care/home management, therapeutic activities, therapeutic exercises    Plan of Care Expires: 01/25/24  Plan of Care Reviewed with: patient    Subjective     "I just got relaxed; I was hoping to do it later." When standing with RW pt repeatedly yelled, "I'm falling." Therapist was contact guard assist and pt was steady in standing. Pt reassured he was not falling.    Communicated with: Nursing prior to session.     Pain/Comfort:  Pain Rating 1: 0/10  Pain Rating Post-Intervention 1: 0/10    Patient's " cultural, spiritual, Oriental orthodox conflicts given the current situation:  no    Objective:     Patient found HOB elevated with gray catheter upon OT entry to room.    Bed Mobility:    Patient completed Rolling/Turning to Left with  stand by assistance and with side rail  Patient completed Rolling/Turning to Right with stand by assistance and with side rail  Patient completed Scooting/Bridging with contact guard assistance and with side rail  Patient completed Supine to Sit with contact guard assistance and with side rail with v/c for encouragement     Functional Mobility/Transfers:  Patient completed Sit <> Stand Transfer with moderate assistance  with  rolling walker   Patient completed Bed <> Chair Transfer using Stand Pivot technique with contact guard assistance with rolling walker    Activities of Daily Living:  Upper Body Dressing with supervision to doff/naseem pull over shirt  Lower Body Dressing with maximal assistance to thread BLE performed EOB with (A) to thread RLE and (A) to orient foot when threading LLE with (A) to steady seated EOB with use of RW when managing pants over hips with v/c sequencing/encouragement   Footwear with maximal assistance to don socks without adaptive equipment     [facility] 6 Click ADL: 16    OT Exercises: Pt performed UBE exercise for 10 minutes with Min resistance.  UE exercises performed to increase functional endurance and strength in order increase independence when performing self care tasks, functional ambulation, W/C propulsion, and functional standing activities .      Treatment & Education:  Pt educated on:  - role of OT  - level of assistance  - energy conservation and task modification to maximized independence with ADL's and mobility   -  safety while performing functional transfers and self care tasks  - progress towards OT goals      Patient left up in chair with  PTA present    GOALS:   Multidisciplinary Problems       Occupational Therapy Goals          Problem:  Occupational Therapy    Goal Priority Disciplines Outcome Interventions   Occupational Therapy Goal     OT, PT/OT Ongoing, Progressing    Description: Goals to be met by: 1/17/2024     Patient will increase functional independence with ADLs by performing:    UE Dressing with Modified [place].  LE Dressing with Stand-by Assistance.  Grooming while seated at sink with Modified [place].  Toileting from toilet with Stand-by Assistance for hygiene and clothing management.   Bathing from sitting/standing at sink with Stand-by Assistance.  Supine to sit with Modified [place].  Step transfer with Stand-by Assistance with RW.  Toilet transfer to toilet with Stand-by Assistance with RW.                         Time Tracking:     OT Date of Treatment: 01/02/24  OT Start Time: 1050    OT Stop Time: 1119  OT Total Time (min): 29 min    Billable Minutes:Self Care/Home Management 17  Therapeutic Exercise 12    1/2/2024

## 2024-01-02 NOTE — NURSING
POC reviewed with pt, pt verbalized understanding. Safety maintained throughout shift, bed locked and in lowest position, call light in reach. Pt remained free of fall/trauma. VSS, afebrile this shift.

## 2024-01-02 NOTE — PLAN OF CARE
Problem: Adult Inpatient Plan of Care  Goal: Plan of Care Review  Outcome: Ongoing, Progressing  Goal: Patient-Specific Goal (Individualized)  Outcome: Ongoing, Progressing  Goal: Absence of Hospital-Acquired Illness or Injury  Outcome: Ongoing, Progressing  Goal: Optimal Comfort and Wellbeing  Outcome: Ongoing, Progressing  Goal: Readiness for Transition of Care  Outcome: Ongoing, Progressing     Problem: Fall Injury Risk  Goal: Absence of Fall and Fall-Related Injury  Outcome: Ongoing, Progressing     Problem: Skin Injury Risk Increased  Goal: Skin Health and Integrity  Outcome: Ongoing, Progressing  Intervention: Optimize Skin Protection  Flowsheets (Taken 1/2/2024 0403)  Skin Protection: adhesive use limited  Head of Bed (HOB) Positioning: HOB at 20 degrees  Intervention: Promote and Optimize Oral Intake  Flowsheets (Taken 1/2/2024 0403)  Oral Nutrition Promotion: calorie-dense foods provided     Problem: Impaired Wound Healing  Goal: Optimal Wound Healing  Outcome: Ongoing, Progressing     Problem: Infection  Goal: Absence of Infection Signs and Symptoms  Outcome: Ongoing, Progressing  Intervention: Prevent or Manage Infection  Flowsheets (Taken 1/2/2024 0403)  Infection Management: aseptic technique maintained  Isolation Precautions: precautions initiated

## 2024-01-02 NOTE — PROGRESS NOTES
[facility]                                  Skilled Nursing Facility                   Progress Note     Patient Name: Dimitri Johnson  YOB: 1933  MRN: [id number]  Room: [place] [id number] A     Admit Date: 12/26/2023   PALOMO: 1/16/2024     Principal Problem:  Weakness    HPI obtained from patient interview and chart review     Chief Complaint: Re-evaluation of medical treatment and therapy status, malnutrition, constipation follow-up, lab review    HPI:   Dimitri Johnson is a 89 y.o. male admitted to [facility] 12/22 with weakness and constipation. He had associated stomach discomfort that was relieved after having a large bowel movement. Daily miralax and prn dulcolax regimen started. Patient has decreased mobility due to his degenerative disc disease and deconditioning. He is hesitant to walk and stand up. He worked with PT and OT who both recommended moderate intensity therapy.     Patient will be treated at [facility] with PT and OT to improve functional status and ability to perform ADLs.     Interval history:  24 hour chart review completed. Pertinent lab, micro, and/or radiology results addressed below. NAEON. NAD.   Vitals: Afebrile, hemodynamically stable.  I/O: Patient reports fair appetite. BUN 33 today, fluids encouraged. Continue Remeron, supplements, assist with meals due to blindness. LBM 01/02 s/p lactolose. Ramirez output CYU. Will need urology f/u outpatient for retention and failed void trial. UA without nitrites or bacteria. U cx without susceptibility. Norco 5 mg Q 8 H p.r.n., doxycycline x5 days, continue finasteride.  Skilled Therapy: Patient progressing with therapy as tolerated and would continue to benefit from skilled PT and OT services to improve overall functional mobility and independence, strength, endurance, and safety.        Past Medical History: Patient has a past medical history of Glaucoma and  Hypertension. 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    Social History: Patient reports that he has never smoked. He does not have any smokeless tobacco history on file. He reports current alcohol use.    Family History:  non contributory    Allergies: Patient is allergic to pcn [penicillins].        ROS  Constitutional:  Negative for fever.  Positive forgetfulness  HENT:  Negative for sore throat.    Eyes:  Negative for redness. + limited vision  Respiratory:  Negative for shortness of breath.    Cardiovascular:  Negative for chest pain.   Gastrointestinal:  Negative for nausea.   Genitourinary:  Negative for dysuria.   Musculoskeletal:  Negative for back pain.    Skin:  Negative for rash.   Neurological:  Positive for weakness.   Hematological:  Does not bruise/bleed easily.   Psychiatric/Behavioral: Negative insomnia      24 hour Vital Sign Range   Temp:  [97.6 °F (36.4 °C)-98.2 °F (36.8 °C)]   Pulse:  [70]   Resp:  [16-18]   BP: (124-126)/(56-59)   SpO2:  [97 %-98 %]       Physical Exam  General: Alert, frail, thin, oriented x4 forgetful at times  HENT: Atraumatic, normocephalic, Right eye keteroplasty/blind, left eye poor vision, hard of hearing  CV: RRR; no murmurs, Normal s1, s2, no peripheral edema  Resp: CTAB with normal work of breathing and chest excursion on room air.   Abd: Soft, NTND. Bowel sounds normoactive  MSK: Generalized weakness  : Mixed incontinence  Neuro: Normal appearing coordination and sensory function.   Skin: No rashes or lesions noted, dry, warm, dressing to left ankle,  dermatitis sacrum  Psych: calm, cooperative, yells out at time         Labs:  Recent Labs   Lab 12/28/23  0556 01/02/24  0440   WBC 11.27 9.46   HGB 13.9* 14.0   HCT 40.0 42.7    340       Recent Labs   Lab 12/28/23  0556 01/02/24  0440  "  * 139   K 4.5 4.7    105   CO2 23 25   BUN 28* 33*   CREATININE 1.1 1.2   GLU 99 76   CALCIUM 8.4* 9.2   MG 2.0 2.0   PHOS 3.4 3.6       No results for input(s): "ALKPHOS", "ALT", "AST", "ALBUMIN", "PROT", "BILITOT", "INR" in the last 168 hours.    No results for input(s): "POCTGLUCOSE" in the last 72 hours.    Meds Scheduled:   amLODIPine  2.5 mg Oral Daily    ascorbic acid (vitamin C)  500 mg Oral BID    aspirin  81 mg Oral Daily    calcium-vitamin D3  2 tablet Oral Daily    doxycycline  100 mg Oral Q12H    enoxparin  30 mg Subcutaneous Q24H (prophylaxis, 1700)    finasteride  5 mg Oral Daily    melatonin  6 mg Oral Nightly    mirtazapine  7.5 mg Oral QHS    polyethylene glycol  17 g Oral Daily    senna-docusate 8.6-50 mg  1 tablet Oral BID    tamsulosin  0.4 mg Oral Daily    zinc sulfate  220 mg Oral Daily       PRN:   acetaminophen, bisacodyL, calcium carbonate, HYDROcodone-acetaminophen, methocarbamoL      Assessment and Plan:    Constipation  CT- Limited examination. Moderately large liquid stool within the patulous rectum.  Question possible diarrheal illness and/or fecal impaction.  Left renal cyst.     Prostatomegaly.  Osseous changes particularly of the spine.  Miralax daily, senokot bid  Encourge oral intake  Avoid narcotics  LBM 12/28  -12/29 KUB no constipation  -1/1 still no BM, pt prefers laxative, give Lactulose 30 g x 1   -1/2 + BM    Degenerative disc disease  Osteoporosis  Muscle spasms   Continue Robaxin QID PRN, ibuprofen p.r.n. Q 8 H x3 days  -12/29:  Initiate Norco 5 mg q8h PRN    Hyponatremia  Hypocalcemia  Dehydration  Encourage oral intake  Monitor on routine lab  Calcium with vitamin-D    Severe malnutrition  Poor appetite  previous weight unknown  RD following  Remeron started  BMI 18   Assist with meals due to vision disturbance    Right lower extremity Baker's cyst  Noted on venous ultrasound  Continue ibuprofen prn pain, ice  Ambulatory referral to " orthopedics    Insomnia  Scheduled melatonin  Remeron 7.5 mg nightly     Urinary retention  Ramirez placed  in ED for retention, discontinued prior to discharge.   Bladder scan q.shift x 3days   Urine culture with staph however previous culture negative, asymptomatic   vitamin C, finasteride  -12/29 Failed void trial, Ramirez replaced. Urology f/u outpatient     Weakness  Debility  Patient uses walker at baseline. His son reported  prior to admit he was unable to get off of toilet and ambulate.    B12 WNL TSH 8.7 but T4 WNL   PT/OT consult   Fall precaution  Lovenox 30mg daily for VTE ppx    Moisture associated dermatitis sacrum   [place] BID   Consult wound care   Vitamin-C and zinc     Primary hypertension  Home Norvasc 5 mg b.i.d.  Decrease Norvasc to 2.5 mg daily    Anticipate disposition:    Home with home health    IP [facility] RISK OF UNPLANNED READMISSION Model: LOW      Follow-up needed during SNF admission:   None    Follow-up needed after discharge from SNF:   - PCP within 1-2 weeks  - See appt scheduled below     No future appointments.      I certify that SNF services are required to be given on an inpatient basis because Dimitri Johnson needs for skilled nursing care and/or skilled rehabilitation are required on a daily basis and such services can only practically be provided in a skilled nursing facility setting and are for an ongoing condition for which she received inpatient care in the hospital.       Extended Visit:   Total time spent: 32 minutes  Description of Time: counseling provided on clinical condition, therapies provided, plan of care, emotional support, coordinating patient care with other care team members, reviewing and interpreting labs and imaging, collaboration with physician, initiating new orders, chart review, and documentation. See interval hx.       Nunu Suárez NP  Department of Hospital Medicine   [facility]     DOS: 1/2/2024       Patient note was  created using MModal Dictation.  Any errors in syntax or even information may not have been identified and edited on initial review prior to signing this note.

## 2024-01-03 PROCEDURE — 97535 SELF CARE MNGMENT TRAINING: CPT | Mod: CO

## 2024-01-03 PROCEDURE — 97116 GAIT TRAINING THERAPY: CPT | Mod: CQ

## 2024-01-03 PROCEDURE — 63600175 PHARM REV CODE 636 W HCPCS: Performed by: FAMILY MEDICINE

## 2024-01-03 PROCEDURE — 97110 THERAPEUTIC EXERCISES: CPT | Mod: CO

## 2024-01-03 PROCEDURE — 97530 THERAPEUTIC ACTIVITIES: CPT | Mod: CO

## 2024-01-03 PROCEDURE — 25000003 PHARM REV CODE 250: Performed by: FAMILY MEDICINE

## 2024-01-03 PROCEDURE — 11000004 HC SNF PRIVATE

## 2024-01-03 PROCEDURE — 25000003 PHARM REV CODE 250: Performed by: HOSPITALIST

## 2024-01-03 PROCEDURE — 97110 THERAPEUTIC EXERCISES: CPT | Mod: CQ

## 2024-01-03 RX ADMIN — DOXYCYCLINE HYCLATE 100 MG: 100 TABLET, COATED ORAL at 08:01

## 2024-01-03 RX ADMIN — MIRTAZAPINE 7.5 MG: 7.5 TABLET, FILM COATED ORAL at 09:01

## 2024-01-03 RX ADMIN — ZINC SULFATE 220 MG (50 MG) CAPSULE 220 MG: CAPSULE at 08:01

## 2024-01-03 RX ADMIN — TAMSULOSIN HYDROCHLORIDE 0.4 MG: 0.4 CAPSULE ORAL at 08:01

## 2024-01-03 RX ADMIN — Medication 500 MG: at 09:01

## 2024-01-03 RX ADMIN — Medication 6 MG: at 09:01

## 2024-01-03 RX ADMIN — DOXYCYCLINE HYCLATE 100 MG: 100 TABLET, COATED ORAL at 06:01

## 2024-01-03 RX ADMIN — Medication 500 MG: at 08:01

## 2024-01-03 RX ADMIN — ASPIRIN 81 MG CHEWABLE TABLET 81 MG: 81 TABLET CHEWABLE at 08:01

## 2024-01-03 RX ADMIN — POLYETHYLENE GLYCOL 3350 17 G: 17 POWDER, FOR SOLUTION ORAL at 08:01

## 2024-01-03 RX ADMIN — ENOXAPARIN SODIUM 30 MG: 30 INJECTION SUBCUTANEOUS at 05:01

## 2024-01-03 RX ADMIN — AMLODIPINE BESYLATE 2.5 MG: 2.5 TABLET ORAL at 08:01

## 2024-01-03 RX ADMIN — Medication 2 TABLET: at 08:01

## 2024-01-03 RX ADMIN — SENNOSIDES AND DOCUSATE SODIUM 1 TABLET: 8.6; 5 TABLET ORAL at 08:01

## 2024-01-03 RX ADMIN — SENNOSIDES AND DOCUSATE SODIUM 1 TABLET: 8.6; 5 TABLET ORAL at 09:01

## 2024-01-03 RX ADMIN — FINASTERIDE 5 MG: 5 TABLET, FILM COATED ORAL at 08:01

## 2024-01-03 NOTE — PT/OT/SLP PROGRESS
"Physical Therapy Treatment    Patient Name:  Dimitri Johnson   MRN:  [id number]  Admit Date: 12/26/2023  Admitting Diagnosis: Weakness  Recent Surgeries: N/A    General Precautions: Standard, aspiration, fall, vision impaired  Orthopedic Precautions: N/A  Braces: N/A    Recommendations:     Discharge Recommendations: home health PT  Level of Assistance Recommended at Discharge: 24 hours significant assistance  Discharge Equipment Recommendations: 3-in-1 commode, wheelchair  Barriers to discharge: Decreased caregiver support    Assessment:     Dimitri Johnson is a 89 y.o. male admitted with a medical diagnosis of Weakness . Pt tolerated well, pt anxious about ambulation d/t balance deficits. Pt amb with CGA x 2 trials. Pt completed seated TE and LBE to tolerance. Pt reported Bilateral neck pain d/t chronic kyphotic posture. Pt would continue to benefit from skilled PT services to improve overall functional mobility, strength and endurance.      Performance deficits affecting function: weakness, impaired endurance, impaired self care skills, impaired functional mobility, gait instability, impaired balance, decreased coordination, decreased safety awareness, decreased ROM.    Rehab Potential is good    Activity Tolerance: Good    Plan:     Patient to be seen 5 x/week to address the above listed problems via gait training, therapeutic activities, therapeutic exercises, neuromuscular re-education, wheelchair management/training    Plan of Care Expires: 01/26/24  Plan of Care Reviewed with: patient    Subjective     Pt agreeable for PT "Between my neck and shoulders its starting to hurt".     Pain/Comfort:  Pain Rating 1: 0/10  Location - Side 1: Bilateral  Location - Orientation 1: generalized  Location 1: neck  Pain Addressed 1: Reposition, Distraction, Cessation of Activity  Pain Rating Post-Intervention 1:  ("its starting to hurt" - reported during ambulation)    Patient's cultural, spiritual, Yazidi conflicts given the " current situation:  no    Objective:       Patient found up in chair with gray catheter upon PT entry to room.     Therapeutic Activities and Exercises: seated TE: hip flex, hip abd/add, LAQ, AP X 20 reps for BLE strengthening vc,tc and demo for proper completion    LBE X 10 min For BLE strengthening, endurance and ROM. Mod resistance    Patient educated on role of therapy, goals of session, and benefits of out of bed mobility.   Instructed on use of call button and importance of calling nursing staff for assistance with mobility   Questions/concerns addressed within PTA scope of practice  Pt verbalized understanding    Functional Mobility:  Transfers:     Sit to Stand:  minimum assistance and moderate assistance with rolling walker. Mod A for first trial and Min A for second trial. Posterior lean noted. Vc to scoot to edge of chair and for hand placement/ sequencing   Gait: pt amb 227 ft + 173 ft CGA with RW. Extended seated rest break in between trials. Vc for upright posture/ gaze direction. Step through gait, narrow LAITH. Pt very anxious d/t instability/ previous falls.    AM-PAC 6 CLICK MOBILITY  15    Patient left up in chair with  PT tech .    GOALS:   Multidisciplinary Problems       Physical Therapy Goals          Problem: Physical Therapy    Goal Priority Disciplines Outcome Goal Variances Interventions   Physical Therapy Goal     PT, PT/OT Ongoing, Progressing     Description: Goals to be met by: 24     Patient will increase functional independence with mobility by performin. Supine to sit with Supervision  2. Sit to supine with Supervision  3. Rolling to Left and Right with Supervision  4. Sit to stand transfer with Stand-by Assistance  5. Bed to chair transfer with Stand-by Assistance using Rolling Walker  6. Gait  x 150 feet with Stand-by Assistance using Rolling Walker  7. Wheelchair propulsion x 150 feet with Modified [place] using bilateral upper extremities  *Continue to assess  living environment for steps to enter home; set goals as appropriate                         Time Tracking:     PT Received On: 01/03/24  PT Start Time: 1131  PT Stop Time: 1214  PT Total Time (min): 43 min    Billable Minutes: Gait Training 20 and Therapeutic Exercise 23    Treatment Type: Treatment  PT/PTA: PTA     Number of PTA visits since last PT visit: 4 01/03/2024

## 2024-01-03 NOTE — PT/OT/SLP PROGRESS
Occupational Therapy   Treatment    Name: Dimitri Johnson  MRN: [id number]  Admit Date: 12/26/2023  Admitting Diagnosis:  Weakness    General Precautions: Standard, fall, vision impaired   Orthopedic Precautions: N/A   Braces: N/A    Recommendations:     Discharge Recommendations:  home health OT  Level of Assistance Recommended at Discharge: 24 hours physical assistance for all ADL's and home management tasks  Discharge Equipment Recommendations: wheelchair, 3-in-1 commode  Barriers to discharge:   (increased A needed for mobility)    Assessment:     Dimitri Johnson is a 89 y.o. male with a medical diagnosis of Weakness.  He presents with limitations in performance of self-care, functional mobility, and ADLs. Performance deficits affecting function are weakness, impaired endurance, impaired self care skills, impaired functional mobility, gait instability, decreased lower extremity function, decreased safety awareness, impaired cognition, impaired coordination, decreased ROM, impaired skin. Pt given step by step v/c throughout Tx which appeared to ease pt's anxiousness. Pt tolerated Tx without incident and is making progress but continues to require assist to perform self care tasks, functional mobility and functional transfers. Pt would continue to benefit from OT intervention to further functional (I)ce and safety.     Rehab Potential is good    Activity tolerance:  Good    Plan:     Patient to be seen 5 x/week to address the above listed problems via self-care/home management, therapeutic activities, therapeutic exercises    Plan of Care Expires: 01/25/24  Plan of Care Reviewed with: patient    Subjective     Communicated with: Nursing prior to session.     Pain/Comfort:  Pain Rating 1: 0/10  Pain Rating Post-Intervention 1: 0/10    Patient's cultural, spiritual, Oriental orthodox conflicts given the current situation:  no    Objective:     Patient found HOB elevated with gray catheter upon OT entry to room.    Bed Mobility:     Patient completed Rolling/Turning to Left with  stand by assistance and with side rail  Patient completed Rolling/Turning to Right with stand by assistance and with side rail  Patient completed Scooting/Bridging with contact guard assistance and with side rail  Patient completed Supine to Sit with contact guard assistance and with side rail     Functional Mobility/Transfers:  Patient completed Sit <> Stand Transfer with moderate assistance  with  rolling walker with v/c for technique/hand placement  Patient completed Bed <> Chair Transfer using Stand Pivot technique with contact guard assistance with rolling walker    Activities of Daily Living:  Upper Body Dressing: supervision to doff/naseem pull over shirt with v/c for sequencing  Lower Body Dressing: minimum assistance to naseem pants seated EOB with (A) to orient RLE when threading and (A) to steady in standing with RW when managing pants over hips  Toileting: maximal assistance to doff/naseem pull tab brief at bed level with PCT performing catheter/mauricio care     [facility] 6 Click ADL: 16    OT Exercises: Pt performed UBE exercise for 10 minutes with Min resistance.  UE exercises performed to increase functional endurance and strength in order increase independence when performing self care tasks, functional ambulation, W/C propulsion, and functional standing activities .      Treatment & Education:  Pt participated in standing activity with CGA/SBA and RW. Pt at raised counter to perform fine motor and visual scanning activity with focus on standing tolerance, functional reaching, dynamic standing bal, crossing midline, and to promote independence with homemaking and self care tasks. Pt tolerated standing for 10 min and 38 sec    Pt educated on:  - role of OT  - level of assistance  - energy conservation and task modification to maximized independence with ADL's and mobility   -  safety while performing functional transfers and self care tasks  - progress towards OT  goals       Patient left up in chair with  rehab technician present    GOALS:   Multidisciplinary Problems       Occupational Therapy Goals          Problem: Occupational Therapy    Goal Priority Disciplines Outcome Interventions   Occupational Therapy Goal     OT, PT/OT Ongoing, Progressing    Description: Goals to be met by: 1/17/2024     Patient will increase functional independence with ADLs by performing:    UE Dressing with Modified [place].  LE Dressing with Stand-by Assistance.  Grooming while seated at sink with Modified [place].  Toileting from toilet with Stand-by Assistance for hygiene and clothing management.   Bathing from sitting/standing at sink with Stand-by Assistance.  Supine to sit with Modified [place].  Step transfer with Stand-by Assistance with RW.  Toilet transfer to toilet with Stand-by Assistance with RW.                         Time Tracking:     OT Date of Treatment: 01/03/24  OT Start Time: 0910    OT Stop Time: 0956  OT Total Time (min): 46 min    Billable Minutes:Self Care/Home Management 20  Therapeutic Activity 13  Therapeutic Exercise 13    1/3/2024

## 2024-01-03 NOTE — PROGRESS NOTES
[facility]                                  Skilled Nursing Facility                   Progress Note     Patient Name: Dimitri Johnson  YOB: 1933  MRN: [id number]  Room: [id number]/QKTJ411 A     Admit Date: 12/26/2023   PALOMO: 1/16/2024     Principal Problem:  Weakness    HPI obtained from patient interview and chart review     Chief Complaint: Re-evaluation of medical treatment and therapy status, malnutrition, urology follow-up    HPI:   Dimitri Johnson is a 89 y.o. male admitted to [facility] 12/22 with weakness and constipation. He had associated stomach discomfort that was relieved after having a large bowel movement. Daily miralax and prn dulcolax regimen started. Patient has decreased mobility due to his degenerative disc disease and deconditioning. He is hesitant to walk and stand up. He worked with PT and OT who both recommended moderate intensity therapy.     Patient will be treated at [facility] with PT and OT to improve functional status and ability to perform ADLs.     Interval history:  24 hour chart review completed. Pertinent lab, micro, and/or radiology results addressed below. NAEON. NAD.   Vitals: Afebrile, hemodynamically stable.  I/O: Patient reports fair appetite. Order is in place for assist with feedings due to blindness. Continue to encourage fluids. Continue Remeron, supplements. LBM 01/02 s/p lactolose. Ramirez output CYU. Will need urology f/u outpatient for retention and failed void trial. UA without nitrites or bacteria. U cx without susceptibility. Norco 5 mg Q 8 H p.r.n., doxycycline x7 days, continue finasteride. Urology clinic appointment requested of unit  via secure chat 1/3.  Skilled Therapy: Patient progressing with therapy as tolerated and would continue to benefit from skilled PT and OT services to improve overall functional mobility and independence, strength, endurance, and safety.         Past Medical History: Patient has a past medical history of Glaucoma and Hypertension. 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    Social History: Patient reports that he has never smoked. He does not have any smokeless tobacco history on file. He reports current alcohol use.    Family History:  non contributory    Allergies: Patient is allergic to pcn [penicillins].        ROS  Constitutional:  Negative for fever.  Positive forgetfulness  HENT:  Negative for sore throat.    Eyes:  Negative for redness. + limited vision  Respiratory:  Negative for shortness of breath.    Cardiovascular:  Negative for chest pain.   Gastrointestinal:  Negative for nausea.   Genitourinary:  Negative for dysuria.   Musculoskeletal:  Negative for back pain.    Skin:  Negative for rash.   Neurological:  Positive for weakness.   Hematological:  Does not bruise/bleed easily.   Psychiatric/Behavioral: Negative insomnia      24 hour Vital Sign Range   Temp:  [98.6 °F (37 °C)]   Pulse:  [73]   Resp:  [18]   BP: (125)/(57)   SpO2:  [96 %]       Physical Exam  General: Alert, frail, thin, oriented x4 forgetful at times  HENT: Atraumatic, normocephalic, Right eye keteroplasty/blind, left eye poor vision, hard of hearing  CV: RRR; no murmurs, Normal s1, s2, no peripheral edema  Resp: CTAB with normal work of breathing and chest excursion on room air.   Abd: Soft, NTND. Bowel sounds normoactive  MSK: Generalized weakness  : Mixed incontinence  Neuro: Normal appearing coordination and sensory function.   Skin: No rashes or lesions noted, dry, warm, dressing to left ankle,  dermatitis sacrum  Psych: calm, cooperative, yells out at time         Labs:  Recent Labs   Lab 12/28/23  0556 01/02/24  0440   WBC 11.27 9.46   HGB 13.9* 14.0   HCT 40.0 42.7    340  "      Recent Labs   Lab 12/28/23  0556 01/02/24  0440   * 139   K 4.5 4.7    105   CO2 23 25   BUN 28* 33*   CREATININE 1.1 1.2   GLU 99 76   CALCIUM 8.4* 9.2   MG 2.0 2.0   PHOS 3.4 3.6       No results for input(s): "ALKPHOS", "ALT", "AST", "ALBUMIN", "PROT", "BILITOT", "INR" in the last 168 hours.    No results for input(s): "POCTGLUCOSE" in the last 72 hours.    Meds Scheduled:   amLODIPine  2.5 mg Oral Daily    ascorbic acid (vitamin C)  500 mg Oral BID    aspirin  81 mg Oral Daily    calcium-vitamin D3  2 tablet Oral Daily    doxycycline  100 mg Oral Q12H    enoxparin  30 mg Subcutaneous Q24H (prophylaxis, 1700)    finasteride  5 mg Oral Daily    melatonin  6 mg Oral Nightly    mirtazapine  7.5 mg Oral QHS    polyethylene glycol  17 g Oral Daily    senna-docusate 8.6-50 mg  1 tablet Oral BID    tamsulosin  0.4 mg Oral Daily    zinc sulfate  220 mg Oral Daily       PRN:   acetaminophen, bisacodyL, calcium carbonate, HYDROcodone-acetaminophen, methocarbamoL      Assessment and Plan:    Severe malnutrition  Poor appetite  previous weight unknown  RD following  Remeron started  BMI 18   Assist with meals due to vision disturbance    Urinary retention  Ramirez placed  in ED for retention, discontinued prior to discharge.   Bladder scan q.shift x 3days   Urine culture with staph however previous culture negative, asymptomatic   vitamin C, finasteride  -12/29 Failed void trial, Ramirez replaced. Curious as to if recurrent constipation contributing to retention  - Avoid constipation  - Urology clinic appointment requested of unit  via secure chat 1/3.    Constipation  CT- Limited examination. Moderately large liquid stool within the patulous rectum.  Question possible diarrheal illness and/or fecal impaction.  Left renal cyst.     Prostatomegaly.  Osseous changes particularly of the spine.  Miralax daily, senokot bid  Encourge oral intake  Avoid narcotics  -12/29 KUB no constipation  -1/1 Lactulose 30 " g x 1   -1/2 + BM  1/3/2024 stable    Degenerative disc disease  Osteoporosis  Muscle spasms   Continue Robaxin QID PRN, ibuprofen p.r.n. Q 8 H x3 days  -12/29:  Initiate Norco 5 mg q8h PRN    Hyponatremia  Hypocalcemia  Dehydration  Encourage oral intake  Monitor on routine lab  Calcium with vitamin-D    Right lower extremity Baker's cyst  Noted on venous ultrasound  Continue ibuprofen prn pain, ice  Ambulatory referral to orthopedics    Insomnia  Scheduled melatonin  Remeron 7.5 mg nightly     Weakness  Debility  Patient uses walker at baseline. His son reported  prior to admit he was unable to get off of toilet and ambulate.    B12 WNL TSH 8.7 but T4 WNL   PT/OT consult   Fall precaution  Lovenox 30mg daily for VTE ppx    Moisture associated dermatitis sacrum   Triad BID   Consult wound care   Vitamin-C and zinc     Primary hypertension  Home Norvasc 5 mg b.i.d.  Decrease Norvasc to 2.5 mg daily    Anticipate disposition:    Home with home health    IP [facility] RISK OF UNPLANNED READMISSION Model: LOW      Follow-up needed during SNF admission:   Urology clinic appointment requested of unit  via secure chat 1/3.    Follow-up needed after discharge from SNF:   - PCP within 1-2 weeks  - See appt scheduled below     No future appointments.      I certify that SNF services are required to be given on an inpatient basis because Dimitri Johnson needs for skilled nursing care and/or skilled rehabilitation are required on a daily basis and such services can only practically be provided in a skilled nursing facility setting and are for an ongoing condition for which she received inpatient care in the hospital.       Extended Visit:   Total time spent: 31 minutes  Description of Time: counseling provided on clinical condition, therapies provided, plan of care, emotional support, coordinating patient care with other care team members, reviewing and interpreting labs and imaging, collaboration with physician, initiating new  orders, chart review, and documentation. See interval hx.       Nunu Suárez NP  Department of Hospital Medicine   [facility]     DOS: 1/3/2024       Patient note was created using MModal Dictation.  Any errors in syntax or even information may not have been identified and edited on initial review prior to signing this note.

## 2024-01-03 NOTE — PLAN OF CARE
Problem: Adult Inpatient Plan of Care  Goal: Plan of Care Review  Outcome: Ongoing, Progressing  Goal: Patient-Specific Goal (Individualized)  Outcome: Ongoing, Progressing  Goal: Absence of Hospital-Acquired Illness or Injury  Outcome: Ongoing, Progressing  Goal: Optimal Comfort and Wellbeing  Outcome: Ongoing, Progressing  Goal: Readiness for Transition of Care  Outcome: Ongoing, Progressing     Problem: Fall Injury Risk  Goal: Absence of Fall and Fall-Related Injury  Outcome: Ongoing, Progressing  Intervention: Identify and Manage Contributors  Flowsheets (Taken 1/3/2024 6110)  Self-Care Promotion:   BADL personal objects within reach   BADL personal routines maintained  Medication Review/Management:   medications reviewed   high-risk medications identified     Problem: Skin Injury Risk Increased  Goal: Skin Health and Integrity  Outcome: Ongoing, Progressing     Problem: Impaired Wound Healing  Goal: Optimal Wound Healing  Outcome: Ongoing, Progressing     Problem: Infection  Goal: Absence of Infection Signs and Symptoms  Outcome: Ongoing, Progressing  Intervention: Prevent or Manage Infection  Flowsheets (Taken 1/3/2024 4027)  Infection Management: aseptic technique maintained  Isolation Precautions: precautions maintained

## 2024-01-04 LAB
ANION GAP SERPL CALC-SCNC: 10 MMOL/L (ref 8–16)
BASOPHILS # BLD AUTO: 0.06 K/UL (ref 0–0.2)
BASOPHILS NFR BLD: 0.7 % (ref 0–1.9)
BUN SERPL-MCNC: 46 MG/DL (ref 8–23)
CALCIUM SERPL-MCNC: 8.9 MG/DL (ref 8.7–10.5)
CHLORIDE SERPL-SCNC: 104 MMOL/L (ref 95–110)
CO2 SERPL-SCNC: 25 MMOL/L (ref 23–29)
CREAT SERPL-MCNC: 1.2 MG/DL (ref 0.5–1.4)
DIFFERENTIAL METHOD BLD: ABNORMAL
EOSINOPHIL # BLD AUTO: 0.3 K/UL (ref 0–0.5)
EOSINOPHIL NFR BLD: 3.2 % (ref 0–8)
ERYTHROCYTE [DISTWIDTH] IN BLOOD BY AUTOMATED COUNT: 13.3 % (ref 11.5–14.5)
EST. GFR  (NO RACE VARIABLE): 57.4 ML/MIN/1.73 M^2
GLUCOSE SERPL-MCNC: 81 MG/DL (ref 70–110)
HCT VFR BLD AUTO: 39.6 % (ref 40–54)
HGB BLD-MCNC: 13.2 G/DL (ref 14–18)
IMM GRANULOCYTES # BLD AUTO: 0.03 K/UL (ref 0–0.04)
IMM GRANULOCYTES NFR BLD AUTO: 0.4 % (ref 0–0.5)
LYMPHOCYTES # BLD AUTO: 2.2 K/UL (ref 1–4.8)
LYMPHOCYTES NFR BLD: 26.2 % (ref 18–48)
MAGNESIUM SERPL-MCNC: 2 MG/DL (ref 1.6–2.6)
MCH RBC QN AUTO: 31.8 PG (ref 27–31)
MCHC RBC AUTO-ENTMCNC: 33.3 G/DL (ref 32–36)
MCV RBC AUTO: 95 FL (ref 82–98)
MONOCYTES # BLD AUTO: 1 K/UL (ref 0.3–1)
MONOCYTES NFR BLD: 11.5 % (ref 4–15)
NEUTROPHILS # BLD AUTO: 4.8 K/UL (ref 1.8–7.7)
NEUTROPHILS NFR BLD: 58 % (ref 38–73)
NRBC BLD-RTO: 0 /100 WBC
PHOSPHATE SERPL-MCNC: 3.8 MG/DL (ref 2.7–4.5)
PLATELET # BLD AUTO: 318 K/UL (ref 150–450)
PMV BLD AUTO: 10.8 FL (ref 9.2–12.9)
POTASSIUM SERPL-SCNC: 4.6 MMOL/L (ref 3.5–5.1)
RBC # BLD AUTO: 4.15 M/UL (ref 4.6–6.2)
SODIUM SERPL-SCNC: 139 MMOL/L (ref 136–145)
WBC # BLD AUTO: 8.35 K/UL (ref 3.9–12.7)

## 2024-01-04 PROCEDURE — 97110 THERAPEUTIC EXERCISES: CPT | Mod: CO

## 2024-01-04 PROCEDURE — 25000003 PHARM REV CODE 250: Performed by: FAMILY MEDICINE

## 2024-01-04 PROCEDURE — 84100 ASSAY OF PHOSPHORUS: CPT | Performed by: FAMILY MEDICINE

## 2024-01-04 PROCEDURE — 97530 THERAPEUTIC ACTIVITIES: CPT

## 2024-01-04 PROCEDURE — 80048 BASIC METABOLIC PNL TOTAL CA: CPT | Performed by: FAMILY MEDICINE

## 2024-01-04 PROCEDURE — 36415 COLL VENOUS BLD VENIPUNCTURE: CPT | Performed by: FAMILY MEDICINE

## 2024-01-04 PROCEDURE — 63600175 PHARM REV CODE 636 W HCPCS: Performed by: FAMILY MEDICINE

## 2024-01-04 PROCEDURE — 94761 N-INVAS EAR/PLS OXIMETRY MLT: CPT

## 2024-01-04 PROCEDURE — 97116 GAIT TRAINING THERAPY: CPT

## 2024-01-04 PROCEDURE — 85025 COMPLETE CBC W/AUTO DIFF WBC: CPT | Performed by: FAMILY MEDICINE

## 2024-01-04 PROCEDURE — 97530 THERAPEUTIC ACTIVITIES: CPT | Mod: CO

## 2024-01-04 PROCEDURE — 25000003 PHARM REV CODE 250: Performed by: HOSPITALIST

## 2024-01-04 PROCEDURE — 83735 ASSAY OF MAGNESIUM: CPT | Performed by: FAMILY MEDICINE

## 2024-01-04 PROCEDURE — 11000004 HC SNF PRIVATE

## 2024-01-04 RX ADMIN — FINASTERIDE 5 MG: 5 TABLET, FILM COATED ORAL at 09:01

## 2024-01-04 RX ADMIN — Medication 500 MG: at 09:01

## 2024-01-04 RX ADMIN — POLYETHYLENE GLYCOL 3350 17 G: 17 POWDER, FOR SOLUTION ORAL at 09:01

## 2024-01-04 RX ADMIN — TAMSULOSIN HYDROCHLORIDE 0.4 MG: 0.4 CAPSULE ORAL at 09:01

## 2024-01-04 RX ADMIN — DOXYCYCLINE HYCLATE 100 MG: 100 TABLET, COATED ORAL at 06:01

## 2024-01-04 RX ADMIN — SENNOSIDES AND DOCUSATE SODIUM 1 TABLET: 8.6; 5 TABLET ORAL at 09:01

## 2024-01-04 RX ADMIN — ENOXAPARIN SODIUM 30 MG: 30 INJECTION SUBCUTANEOUS at 05:01

## 2024-01-04 RX ADMIN — ZINC SULFATE 220 MG (50 MG) CAPSULE 220 MG: CAPSULE at 09:01

## 2024-01-04 RX ADMIN — Medication 6 MG: at 09:01

## 2024-01-04 RX ADMIN — Medication 2 TABLET: at 09:01

## 2024-01-04 RX ADMIN — MIRTAZAPINE 7.5 MG: 7.5 TABLET, FILM COATED ORAL at 09:01

## 2024-01-04 RX ADMIN — AMLODIPINE BESYLATE 2.5 MG: 2.5 TABLET ORAL at 09:01

## 2024-01-04 RX ADMIN — HYDROCODONE BITARTRATE AND ACETAMINOPHEN 1 TABLET: 5; 325 TABLET ORAL at 09:01

## 2024-01-04 RX ADMIN — ASPIRIN 81 MG CHEWABLE TABLET 81 MG: 81 TABLET CHEWABLE at 09:01

## 2024-01-04 NOTE — PROGRESS NOTES
[facility] - Skilled Nursing  Adult Nutrition  Progress Note    SUMMARY   Recommendations  Continue with regular diet, boost plus BID vanilla, ice cream with lunch and dinner, assist with set up,RD following  Goals: PO to meet 75% of EEN/EPN with ONS by next RD follow up  Nutrition Goal Status: progressing towards goal  Communication of RD Recs: other (comment) (POC)    Assessment and Plan   Severe malnutrition  Malnutrition Type:  Context:  social/environmental  Level:  severe     Related to (etiology):   Inadequate oral  intake,      Signs and Symptoms (as evidenced by):   BMI 18      Malnutrition Characteristic Summary:   5% weight loss in one month  Severe fat loss  Moderate to severe muscle loss        Interventions/Recommendations (treatment strategy):  Commercial beverage( calories) boost plus BID with ice cream  Collaboration with other providers  General diet  Vitamin supplement therapy- Vit D, Vit C  Mineral supplement therapy- zinc, Calcium  Prescription medication- mirtazapine     Nutrition Diagnosis Status:   New    Malnutrition Assessment   12/29     Skin (Micronutrient): none, bruised, dry, pallor  Neck/Chest (Micronutrient): muscle wasting, bony prominence, subcutaneous fat loss  Musculoskeletal/Lower Extremities: subcutaneous fat loss, muscle control poor, muscle wasting           Orbital Region (Subcutaneous Fat Loss): severe depletion  Upper Arm Region (Subcutaneous Fat Loss): severe depletion  Thoracic and Lumbar Region: severe depletion   [place] Region (Muscle Loss): moderate depletion  Clavicle Bone Region (Muscle Loss): moderate depletion  Clavicle and Acromion Bone Region (Muscle Loss): severe depletion  Scapular Bone Region (Muscle Loss): severe depletion  Dorsal Hand (Muscle Loss): severe depletion  Patellar Region (Muscle Loss): moderate depletion  Anterior Thigh Region (Muscle Loss): moderate depletion  Posterior Calf Region (Muscle Loss): moderate depletion                 Reason for  "Assessment  Reason For Assessment: RD follow-up  Diagnosis:  (wekaness, constipation)  Relevant Medical History: HTN, deconditioning  Interdisciplinary Rounds: attended    General Information Comments: pateint states he would drink a milkshake but not boost plus, will add ice cream to lunch and dinner meals. Patient reports his daughter cooks evening meal and her fixes his own breakfast, so only eats two meals per day.    Nutrition Discharge Planning: DC on regular diet, ONS of choice for weight gain    Nutrition/Diet History    Patient Reported Diet/Restrictions/Preferences: general  Spiritual, Cultural Beliefs, [employer] Practices, Values that Affect Care: no  Food Allergies: NKFA  Factors Affecting Nutritional Intake: decreased appetite, constipation    Anthropometrics    Temp: 97.7 °F (36.5 °C)  Height Method: Stated  Height: 5' 11" (180.3 cm)  Height (inches): 71 in  Weight Method: Bed Scale  Weight: 58.8 kg (129 lb 10.1 oz)  Weight (lb): 129.63 lb  Ideal Body Weight (IBW), Male: 172 lb  % Ideal Body Weight, Male (lb): 75.37 %  BMI (Calculated): 18.1  BMI Grade: 18.5-24.9 - normal  Usual Body Weight (UBW), k.6 kg  % Usual Body Weight: 95.65  % Weight Change From Usual Weight: -4.55 %       Lab/Procedures/Meds    Pertinent Labs Reviewed: reviewed  Pertinent Labs Comments: Hg 13.2, Hct 39.6, GFR 57.4, BUN 46,  Pertinent Medications Reviewed: reviewed  Pertinent Medications Comments: Abx, mirtazapine    Estimated/Assessed Needs    Weight Used For Calorie Calculations: 61.6 kg (135 lb 12.9 oz) (UBW)  Energy Calorie Requirements (kcal): 1405-7092  Energy Need Method: Kcal/kg (25-30 kcal/kg for weight gain)  Protein Requirements: 80g  Weight Used For Protein Calculations: 61.6 kg (135 lb 12.9 oz) (UBW x 1.3)  Fluid Requirements (mL): 1540 or per MD  Estimated Fluid Requirement Method: RDA Method  RDA Method (mL): 1540  CHO Requirement: -    Nutrition Prescription Ordered  Current Diet Order: Regular  Nutrition " Order Comments: PO 50-75%  Oral Nutrition Supplement: boost plus vanilla BID    Evaluation of Received Nutrient/Fluid Intake  I/O: no data  Energy Calories Required: not meeting needs  Protein Required: not meeting needs  Fluid Required: meeting needs  Comments: LBM 1/2, patient states that he may indeed need lactulose again to have a BM in two days  Tolerance: tolerating  % Intake of Estimated Energy Needs: 50 - 75 %  % Meal Intake: 50 - 75 %    Nutrition Risk    Level of Risk/Frequency of Follow-up: low (one time per week)     Monitor and Evaluation    Food and Nutrient Intake: food and beverage intake  Food and Nutrient Adminstration: diet order  Physical Activity and Function: nutrition-related ADLs and IADLs  Anthropometric Measurements: weight change  Biochemical Data, Medical Tests and Procedures: electrolyte and renal panel, gastrointestinal profile  Nutrition-Focused Physical Findings: overall appearance     Nutrition Follow-Up    RD Follow-up?: Yes

## 2024-01-04 NOTE — PROGRESS NOTES
Attempted to see pt for WC fu assessment. Pt in room sitting up in wheelchair. Pt requested that WC come assess tomorrow as he is comfortable in wheelchair and does not want to get up and in bed at this time.     FU 1/5

## 2024-01-04 NOTE — PLAN OF CARE
Continue with regular diet, boost plus BID vanilla, ice cream with lunch and dinner, assist with set up,RD following  Goals: PO to meet 75% of EEN/EPN with ONS by next RD follow up  Nutrition Goal Status: progressing towards goal  Communication of RD Recs: other (comment) (POC)     Assessment and Plan   Severe malnutrition  Malnutrition Type:  Context:  social/environmental  Level:  severe     Related to (etiology):   Inadequate oral  intake,      Signs and Symptoms (as evidenced by):   BMI 18      Malnutrition Characteristic Summary:   5% weight loss in one month  Severe fat loss  Moderate to severe muscle loss        Interventions/Recommendations (treatment strategy):  Commercial beverage( calories) boost plus BID with ice cream  Collaboration with other providers  General diet  Vitamin supplement therapy- Vit D, Vit C  Mineral supplement therapy- zinc, Calcium  Prescription medication- mirtazapine     Nutrition Diagnosis Status:   New

## 2024-01-04 NOTE — PT/OT/SLP PROGRESS
"Occupational Therapy   Treatment    Name: Dimitri Johnson  MRN: [id number]  Admit Date: 12/26/2023  Admitting Diagnosis:  Weakness    General Precautions: Standard, fall, vision impaired   Orthopedic Precautions: N/A   Braces: N/A    Recommendations:     Discharge Recommendations:  home health OT  Level of Assistance Recommended at Discharge: 24 hours physical assistance for all ADL's and home management tasks  Discharge Equipment Recommendations: wheelchair, 3-in-1 commode  Barriers to discharge:   (increased A needed for mobility)    Assessment:     Dimitri Johnson is a 89 y.o. male with a medical diagnosis of Weakness.  He presents with limitations in performance of self-care, functional mobility, and ADLs. Performance deficits affecting function are weakness, impaired endurance, impaired self care skills, impaired functional mobility, gait instability, decreased lower extremity function, decreased safety awareness, impaired cognition, impaired coordination, decreased ROM, impaired skin. Pt tolerated Tx without incident and is making progress but continues to require assist to perform self care tasks, functional mobility and functional transfers. Pt would continue to benefit from OT intervention to further functional (I)ce and safety.     Rehab Potential is good    Activity tolerance:  Good    Plan:     Patient to be seen 5 x/week to address the above listed problems via self-care/home management, therapeutic activities, therapeutic exercises    Plan of Care Expires: 01/25/24  Plan of Care Reviewed with: patient    Subjective     Communicated with: Nursing prior to session.     Pain/Comfort:  Pain Rating 1: 0/10  Location - Orientation 1: generalized  Location 1: gluteal  Pain Addressed 1: Reposition, Distraction, Pre-medicate for activity  Pain Rating Post-Intervention 1:  ("sore")    Patient's cultural, spiritual, Episcopal conflicts given the current situation:  no    Objective:     Patient found up in chair with gray " catheter upon OT entry to room.    Bed Mobility:    Pt seated in chair at onset of treatment session.     Functional Mobility/Transfers:  Patient completed Sit <> Stand Transfer with moderate assistance  with  rolling walker  with v/c for technique/sequencing    AMPAC 6 Click ADL: 16    OT Exercises: Pt performed UBE exercise for 10 minutes with Mod resistance.  UE exercises performed to increase functional endurance and strength in order increase independence when performing self care tasks, functional ambulation, W/C propulsion, and functional standing activities .      Treatment & Education:  Pt participated in gross motor balloon volleyball standing activity with CGA and RW. Activity with focus on standing tolerance, functional reaching, dynamic standing bal, crossing midline, and to promote independence with homemaking and self care tasks.     Pt educated on:  - role of OT  - level of assistance  - energy conservation and task modification to maximized independence with ADL's and mobility   -  safety while performing functional transfers and self care tasks  - progress towards OT goals    Patient left up in chair with call button in reach    GOALS:   Multidisciplinary Problems       Occupational Therapy Goals          Problem: Occupational Therapy    Goal Priority Disciplines Outcome Interventions   Occupational Therapy Goal     OT, PT/OT Ongoing, Progressing    Description: Goals to be met by: 1/17/2024     Patient will increase functional independence with ADLs by performing:    UE Dressing with Modified [place].  LE Dressing with Stand-by Assistance.  Grooming while seated at sink with Modified [place].  Toileting from toilet with Stand-by Assistance for hygiene and clothing management.   Bathing from sitting/standing at sink with Stand-by Assistance.  Supine to sit with Modified [place].  Step transfer with Stand-by Assistance with RW.  Toilet transfer to toilet with Stand-by Assistance with  RW.                         Time Tracking:     OT Date of Treatment: 01/04/24  OT Start Time: 0953    OT Stop Time: 1031  OT Total Time (min): 38 min    Billable Minutes:Therapeutic Activity 24  Therapeutic Exercise 14    1/4/2024

## 2024-01-04 NOTE — PROGRESS NOTES
[facility]                                  Skilled Nursing Facility                   Progress Note     Patient Name: Dimitri Johnson  YOB: 1933  MRN: [id number]  Room: [place] [id number] A     Admit Date: 12/26/2023   PALOMO: 1/16/2024     Principal Problem:  Weakness    HPI obtained from patient interview and chart review     Chief Complaint: Re-evaluation of medical treatment and therapy status, malnutrition, urology follow-up, lab review    HPI:   Dimitri Johnson is a 89 y.o. male admitted to [facility] 12/22 with weakness and constipation. He had associated stomach discomfort that was relieved after having a large bowel movement. Daily miralax and prn dulcolax regimen started. Patient has decreased mobility due to his degenerative disc disease and deconditioning. He is hesitant to walk and stand up. He worked with PT and OT who both recommended moderate intensity therapy.     Patient will be treated at [facility] with PT and OT to improve functional status and ability to perform ADLs.     Interval history:  24 hour chart review completed. Pertinent lab, micro, and/or radiology results addressed below. NAEON. NAD.   Vitals: Afebrile, hemodynamically stable. HTN this morning, resolved.  I/O: Patient reports fair appetite. Order is in place for assist with feedings due to blindness. BUN 46. Continue to encourage fluids. Continue Remeron, supplements. LBM 01/03. Ramirez output CYU. Will need urology f/u outpatient for retention and failed void trial. UA without nitrites or bacteria. U cx with coagulase negative staph, without susceptibility. Norco 5 mg Q 8 H p.r.n., doxycycline x7 days (EOC 1/6), continue finasteride. Urology clinic appointment requested of unit  via secure chat 1/3.  Skilled Therapy: Patient progressing with therapy as tolerated and would continue to benefit from skilled PT and OT services to improve overall  functional mobility and independence, strength, endurance, and safety.      Past Medical History: Patient has a past medical history of Glaucoma and Hypertension. 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    Social History: Patient reports that he has never smoked. He does not have any smokeless tobacco history on file. He reports current alcohol use.    Family History:  non contributory    Allergies: Patient is allergic to pcn [penicillins].        ROS  Constitutional:  Negative for fever.  Positive forgetfulness  HENT:  Negative for sore throat.    Eyes:  Negative for redness. + limited vision  Respiratory:  Negative for shortness of breath.    Cardiovascular:  Negative for chest pain.   Gastrointestinal:  Negative for nausea.   Genitourinary:  Negative for dysuria.   Musculoskeletal:  Negative for back pain.    Skin:  Negative for rash.   Neurological:  Positive for weakness.   Hematological:  Does not bruise/bleed easily.   Psychiatric/Behavioral: Negative insomnia      24 hour Vital Sign Range   Temp:  [97.7 °F (36.5 °C)-98.6 °F (37 °C)]   Pulse:  [65-80]   Resp:  [16-18]   BP: (121-160)/(58-69)   SpO2:  [96 %-97 %]       Physical Exam  General: Alert, frail, thin, oriented x4 forgetful at times  HENT: Atraumatic, normocephalic, Right eye keteroplasty/blind, left eye poor vision, hard of hearing  CV: RRR; no murmurs, Normal s1, s2, no peripheral edema  Resp: CTAB with normal work of breathing and chest excursion on room air.   Abd: Soft, NTND. Bowel sounds normoactive  MSK: Generalized weakness  : Mixed incontinence  Neuro: Normal appearing coordination and sensory function.   Skin: No rashes or lesions noted, dry, warm, dressing to left ankle,  dermatitis sacrum  Psych: calm, cooperative, yells out at time         Labs:  Recent Labs  "  Lab 01/02/24  0440 01/04/24  0553   WBC 9.46 8.35   HGB 14.0 13.2*   HCT 42.7 39.6*    318       Recent Labs   Lab 01/02/24  0440 01/04/24  0553    139   K 4.7 4.6    104   CO2 25 25   BUN 33* 46*   CREATININE 1.2 1.2   GLU 76 81   CALCIUM 9.2 8.9   MG 2.0 2.0   PHOS 3.6 3.8       No results for input(s): "ALKPHOS", "ALT", "AST", "ALBUMIN", "PROT", "BILITOT", "INR" in the last 168 hours.    No results for input(s): "POCTGLUCOSE" in the last 72 hours.    Meds Scheduled:   amLODIPine  2.5 mg Oral Daily    ascorbic acid (vitamin C)  500 mg Oral BID    aspirin  81 mg Oral Daily    calcium-vitamin D3  2 tablet Oral Daily    doxycycline  100 mg Oral Q12H    enoxparin  30 mg Subcutaneous Q24H (prophylaxis, 1700)    finasteride  5 mg Oral Daily    melatonin  6 mg Oral Nightly    mirtazapine  7.5 mg Oral QHS    polyethylene glycol  17 g Oral Daily    senna-docusate 8.6-50 mg  1 tablet Oral BID    tamsulosin  0.4 mg Oral Daily    zinc sulfate  220 mg Oral Daily       PRN:   acetaminophen, bisacodyL, calcium carbonate, HYDROcodone-acetaminophen, methocarbamoL      Assessment and Plan:    Severe malnutrition  Poor appetite  Body mass index is 18.08 kg/m²., previous weight unknown  RD following  Continue Remeron  Patient reports fair appetite.   Order in place for assist with feedings due to blindness.     Urinary retention  Ramirez placed  in ED for retention, discontinued prior to discharge.   Bladder scan q.shift x 3days   Urine culture with staph however previous culture negative, asymptomatic   vitamin C, finasteride  -12/29 Failed void trial, Ramirez replaced. Curious as to if recurrent constipation contributing to retention  - Avoid constipation  - Urology clinic appointment requested of unit  via secure chat 1/3.    Constipation  CT- Limited examination. Moderately large liquid stool within the patulous rectum.  Question possible diarrheal illness and/or fecal impaction.  Left renal cyst.   "   Prostatomegaly.  Osseous changes particularly of the spine.  Miralax daily, senokot bid  Encourge oral intake  Avoid narcotics  -12/29 KUB no constipation  -1/1 Lactulose 30 g x 1   -1/2 + BM  1/4/2024: stable    Degenerative disc disease  Osteoporosis  Muscle spasms   Continue Robaxin QID PRN, ibuprofen p.r.n. Q 8 H x3 days  -12/29:  Initiate Norco 5 mg q8h PRN    Hyponatremia  Hypocalcemia  Dehydration  Encourage oral intake  Monitor on routine lab  Calcium with vitamin-D    Right lower extremity Baker's cyst  Noted on venous ultrasound  Continue ibuprofen prn pain, ice  Ambulatory referral to orthopedics    Insomnia  Scheduled melatonin  Remeron 7.5 mg nightly     Weakness  Debility  Patient uses walker at baseline. His son reported  prior to admit he was unable to get off of toilet and ambulate.    B12 WNL TSH 8.7 but T4 WNL   PT/OT consult   Fall precaution  Lovenox 30mg daily for VTE ppx    Moisture associated dermatitis sacrum   Triad BID   Consult wound care   Vitamin-C and zinc     Primary hypertension  Home Norvasc 5 mg b.i.d.  Decrease Norvasc to 2.5 mg daily    Anticipate disposition:    Home with home health    IP [facility] RISK OF UNPLANNED READMISSION Model: LOW      Follow-up needed during SNF admission:   Urology clinic appointment requested of unit  via secure chat 1/3.    Follow-up needed after discharge from SNF:   - PCP within 1-2 weeks  - See appt scheduled below     No future appointments.      I certify that SNF services are required to be given on an inpatient basis because Dimitri Johnson needs for skilled nursing care and/or skilled rehabilitation are required on a daily basis and such services can only practically be provided in a skilled nursing facility setting and are for an ongoing condition for which she received inpatient care in the hospital.       Extended Visit:   Total time spent: 31 minutes  Description of Time: counseling provided on clinical condition, therapies provided,  plan of care, emotional support, coordinating patient care with other care team members, reviewing and interpreting labs and imaging, collaboration with physician, initiating new orders, chart review, and documentation. See interval hx.       Nunu Suárez NP  Department of Hospital Medicine   [facility]     DOS: 1/4/2024       Patient note was created using MModal Dictation.  Any errors in syntax or even information may not have been identified and edited on initial review prior to signing this note.

## 2024-01-04 NOTE — PT/OT/SLP PROGRESS
"Physical Therapy Treatment    Patient Name:  Dimitri Johnson   MRN:  [id number]  Admit Date: 12/26/2023  Admitting Diagnosis: Weakness  Recent Surgeries: N/A    General Precautions: Standard, fall, aspiration, vision impaired  Orthopedic Precautions: N/A  Braces: N/A    Recommendations:     Discharge Recommendations: home health PT  Level of Assistance Recommended at Discharge: 24 hours light assistance  Discharge Equipment Recommendations: 3-in-1 commode, wheelchair  Barriers to discharge: Decreased caregiver support    Assessment:     Dimitri Johnson is a 89 y.o. male admitted with a medical diagnosis of Weakness . Pt agreeable to therapy, highly anxious about falling, has little confidence in ambulation abilities. Pt able to navigate both curb step and 4 steps with rails today with assistance. Continue to progress ambulation to improve confidence with gait and stairs.      Performance deficits affecting function: weakness, gait instability, impaired endurance, impaired balance, decreased safety awareness, pain, impaired self care skills, impaired functional mobility, decreased lower extremity function.    Rehab Potential is good    Activity Tolerance: Good    Plan:     Patient to be seen 5 x/week to address the above listed problems via gait training, therapeutic activities, therapeutic exercises, neuromuscular re-education, wheelchair management/training    Plan of Care Expires: 01/26/24  Plan of Care Reviewed with: patient    Subjective     "I don't feel like I can do this today."     Pain/Comfort:  Pain Rating 1: 7/10  Location - Side 1: Right  Location - Orientation 1: generalized  Location 1: heel  Pain Addressed 1: Reposition, Distraction, Nurse notified    Patient's cultural, spiritual, Scientology conflicts given the current situation:  no    Objective:     Communicated with MORENO [place] prior to session.  Patient found supine with gray catheter upon PT entry to room.     Functional Mobility:  Bed Mobility:     Supine to " "Sit: minimum assistance  L side of bed  Transfers:     Sit to Stand:  moderate assistance with rolling walker  X1 from EOB  X3 from wheelchair  Bed to Chair: minimum assistance with  rolling walker  using  Step Transfer  Gait: 75ft + 50ft w/ RW and CGA  Balance: CGA throughout  Stairs:    Pt ascended/descended 4" curb step with Rolling Walker with no handrails with Minimal Assistance.   Pt ascended/descended  4 stair(s) with No Assistive Device with bilateral handrails with Contact Guard Assistance.   Step to step    AM-PAC 6 CLICK MOBILITY  17    Patient left up in chair with call button in reach.    GOALS:   Multidisciplinary Problems       Physical Therapy Goals          Problem: Physical Therapy    Goal Priority Disciplines Outcome Goal Variances Interventions   Physical Therapy Goal     PT, PT/OT Ongoing, Progressing     Description: Goals to be met by: 24     Patient will increase functional independence with mobility by performin. Supine to sit with Supervision  2. Sit to supine with Supervision  3. Rolling to Left and Right with Supervision  4. Sit to stand transfer with Stand-by Assistance  5. Bed to chair transfer with Stand-by Assistance using Rolling Walker  6. Gait  x 150 feet with Stand-by Assistance using Rolling Walker  7. Wheelchair propulsion x 150 feet with Modified [place] using bilateral upper extremities  *Continue to assess living environment for steps to enter home; set goals as appropriate                         Time Tracking:     PT Received On: 24  PT Start Time: 908  PT Stop Time: 950  PT Total Time (min): 42 min    Billable Minutes: Gait Training 25 min and Therapeutic Activity 15 min    Treatment Type: Treatment  PT/PTA: PT     Number of PTA visits since last PT visit: 0     2024  "

## 2024-01-04 NOTE — PLAN OF CARE
Problem: Adult Inpatient Plan of Care  Goal: Plan of Care Review  Outcome: Ongoing, Progressing  Flowsheets (Taken 1/4/2024 0117)  Plan of Care Reviewed With: patient  Goal: Patient-Specific Goal (Individualized)  Outcome: Ongoing, Progressing  Goal: Absence of Hospital-Acquired Illness or Injury  Outcome: Ongoing, Progressing  Goal: Optimal Comfort and Wellbeing  Outcome: Ongoing, Progressing  Goal: Readiness for Transition of Care  Outcome: Ongoing, Progressing     Problem: Fall Injury Risk  Goal: Absence of Fall and Fall-Related Injury  Outcome: Ongoing, Progressing     Problem: Skin Injury Risk Increased  Goal: Skin Health and Integrity  Outcome: Ongoing, Progressing     Problem: Impaired Wound Healing  Goal: Optimal Wound Healing  Outcome: Ongoing, Progressing     Problem: Infection  Goal: Absence of Infection Signs and Symptoms  Outcome: Ongoing, Progressing

## 2024-01-04 NOTE — PLAN OF CARE
Problem: Adult Inpatient Plan of Care  Goal: Plan of Care Review  Outcome: Ongoing, Progressing  Flowsheets (Taken 1/4/2024 6391)  Plan of Care Reviewed With: patient     Problem: Fall Injury Risk  Goal: Absence of Fall and Fall-Related Injury  Outcome: Ongoing, Progressing     Problem: Skin Injury Risk Increased  Goal: Skin Health and Integrity  Outcome: Ongoing, Progressing     Problem: Impaired Wound Healing  Goal: Optimal Wound Healing  Outcome: Ongoing, Progressing     Problem: Infection  Goal: Absence of Infection Signs and Symptoms  Outcome: Ongoing, Progressing

## 2024-01-05 PROCEDURE — 11000004 HC SNF PRIVATE

## 2024-01-05 PROCEDURE — 25000003 PHARM REV CODE 250: Performed by: HOSPITALIST

## 2024-01-05 PROCEDURE — 63600175 PHARM REV CODE 636 W HCPCS: Performed by: FAMILY MEDICINE

## 2024-01-05 PROCEDURE — 97110 THERAPEUTIC EXERCISES: CPT

## 2024-01-05 PROCEDURE — 97535 SELF CARE MNGMENT TRAINING: CPT

## 2024-01-05 PROCEDURE — 25000003 PHARM REV CODE 250: Performed by: FAMILY MEDICINE

## 2024-01-05 RX ADMIN — Medication 500 MG: at 08:01

## 2024-01-05 RX ADMIN — ASPIRIN 81 MG CHEWABLE TABLET 81 MG: 81 TABLET CHEWABLE at 08:01

## 2024-01-05 RX ADMIN — DOXYCYCLINE HYCLATE 100 MG: 100 TABLET, COATED ORAL at 06:01

## 2024-01-05 RX ADMIN — Medication 500 MG: at 09:01

## 2024-01-05 RX ADMIN — Medication 2 TABLET: at 08:01

## 2024-01-05 RX ADMIN — SENNOSIDES AND DOCUSATE SODIUM 1 TABLET: 8.6; 5 TABLET ORAL at 09:01

## 2024-01-05 RX ADMIN — AMLODIPINE BESYLATE 2.5 MG: 2.5 TABLET ORAL at 08:01

## 2024-01-05 RX ADMIN — HYDROCODONE BITARTRATE AND ACETAMINOPHEN 1 TABLET: 5; 325 TABLET ORAL at 07:01

## 2024-01-05 RX ADMIN — Medication 6 MG: at 09:01

## 2024-01-05 RX ADMIN — MIRTAZAPINE 7.5 MG: 7.5 TABLET, FILM COATED ORAL at 09:01

## 2024-01-05 RX ADMIN — POLYETHYLENE GLYCOL 3350 17 G: 17 POWDER, FOR SOLUTION ORAL at 08:01

## 2024-01-05 RX ADMIN — ZINC SULFATE 220 MG (50 MG) CAPSULE 220 MG: CAPSULE at 08:01

## 2024-01-05 RX ADMIN — FINASTERIDE 5 MG: 5 TABLET, FILM COATED ORAL at 08:01

## 2024-01-05 RX ADMIN — SENNOSIDES AND DOCUSATE SODIUM 1 TABLET: 8.6; 5 TABLET ORAL at 08:01

## 2024-01-05 RX ADMIN — DOXYCYCLINE HYCLATE 100 MG: 100 TABLET, COATED ORAL at 07:01

## 2024-01-05 RX ADMIN — TAMSULOSIN HYDROCHLORIDE 0.4 MG: 0.4 CAPSULE ORAL at 08:01

## 2024-01-05 RX ADMIN — HYDROCODONE BITARTRATE AND ACETAMINOPHEN 1 TABLET: 5; 325 TABLET ORAL at 04:01

## 2024-01-05 RX ADMIN — ENOXAPARIN SODIUM 30 MG: 30 INJECTION SUBCUTANEOUS at 06:01

## 2024-01-05 NOTE — PT/OT/SLP PROGRESS
"Occupational Therapy   Treatment    Name: Dimitri Johnson  MRN: [id number]  Admit Date: 12/26/2023  Admitting Diagnosis:  Weakness    General Precautions: Standard, fall, aspiration, vision impaired   Orthopedic Precautions: N/A   Braces: N/A    Recommendations:     Discharge Recommendations:  home health OT  Level of Assistance Recommended at Discharge: 24 hours light assistance for ADL's and homemaking tasks  Discharge Equipment Recommendations: wheelchair, 3-in-1 commode  Barriers to discharge:   (increased A needed for mobility)    Assessment:     Dimitri Johnson is a 89 y.o. male with a medical diagnosis of Weakness. Pt tolerated session well and without incident and shows excellent motivation and potential to improve, but the pt continues to require assistance to perform self-care tasks and mobility. Pt strengths include Attention to task and Motivation and Willingness to participate. Pt improved UBD and LBD. However, pt would continue to benefit from cont'd OT services in the SNF setting to improve safety and independence /c functional tasks and ADLs upon discharge. Performance deficits affecting function are weakness, impaired endurance, impaired self care skills, impaired functional mobility, gait instability, decreased lower extremity function, decreased safety awareness, impaired cognition, impaired coordination, decreased ROM, impaired skin.     Rehab Potential is fair    Activity tolerance:  Good    Plan:     Patient to be seen 5 x/week to address the above listed problems via self-care/home management, therapeutic activities, therapeutic exercises    Plan of Care Expires: 01/25/24  Plan of Care Reviewed with: patient    Subjective     Communicated with: MERLINE prior to session.      "This feels rather precarious" Re: standing /c RW    Pain/Comfort:  Pain Rating 1: 0/10  Pain Rating Post-Intervention 1: 0/10    Patient's cultural, spiritual, Restoration conflicts given the current situation:  no    Objective: " What Type Of Note Output Would You Prefer (Optional)?: Bullet Format     Patient found supine with gray catheter upon OT entry to room. Pt alert and agreeable to therapy.       Bed Mobility:    Patient completed Supine to Sit with stand by assistance     Functional Mobility/Transfers:  Patient completed Sit <> Stand Transfer with moderate assistance  with  rolling walker   Patient completed Bed <> Chair Transfer using Stand Pivot technique with minimum assistance with rolling walker and extensive vcs  Patient completed Chair <> Mat Stand Pivot technique with minimum assistance with rolling walker and extensive vcs      Activities of Daily Living:  Upper Body Dressing: stand by assistance to don/doff shirt seated at EOB  Lower Body Dressing: minimum assistance to doff/don pants /c BLE threaded seated at EOB and managed over hips in standing /c RW. (A) given for minor adjustments over R foot in figure 4 position and for standing balance when donning over hips. Pt educated on using adaptive dressing strategies to avoid pants falling to floor when standing.    [facility] 6 Click ADL: 17    OT Exercises:     ~Pt performed 2x10 reps of the following exercises while seated on exercise mat    Green theraband- Wide  and close  rows  1 lb dowel- modified abdominal crunches    Pt req intermittent rest break between sets.         Treatment & Education:  Pt educated on POC, role of OT, adaptive dressing techniques, safety, and proper body mechanics for performing functional transfers. Pt performed tasks to improve safety and independence in functional tasks and ADLs as mentioned above.       Patient left up in chair with  cynthia Tay present and all needs met, returning to room    GOALS:   Multidisciplinary Problems       Occupational Therapy Goals          Problem: Occupational Therapy    Goal Priority Disciplines Outcome Interventions   Occupational Therapy Goal     OT, PT/OT Ongoing, Progressing    Description: Goals to be met by: 1/17/2024     Patient will increase functional independence  How Severe Is Your Acne?: mild Is This A New Presentation, Or A Follow-Up?: Acne with ADLs by performing:    UE Dressing with Modified [place]- Ongoing  LE Dressing with Stand-by Assistance-Ongoing  Grooming while seated at sink with Modified [place]- Ongoing  Toileting from toilet with Stand-by Assistance for hygiene and clothing management.   Bathing from sitting/standing at sink with Stand-by Assistance.  Supine to sit with Modified [place].  Step transfer with Stand-by Assistance with RW.  Toilet transfer to toilet with Stand-by Assistance with RW.  Footwear /c Mod A and AE as needed  Tolerate standing functionals tasks for ~5 minutes /c CGA and RW as needed                         Time Tracking:     OT Date of Treatment: 01/05/24  OT Start Time: 1016    OT Stop Time: 1055  OT Total Time (min): 39 min    Billable Minutes:Self Care/Home Management 23  Therapeutic Exercise 15    1/5/2024   Additional Comments (Use Complete Sentences): Recommended marty :)

## 2024-01-05 NOTE — CARE UPDATE
Interdisciplinary team, Violette Adam, RN Charge Nurse, Mary Colón, , Emily Ochoa, PT, Rehab, and Val Parada, Dietician, spoke to patient and patient's son via phone for care plan conference, weekly status update, and therapy progress update. Tentative discharge date set for 1/16/24.

## 2024-01-05 NOTE — PROGRESS NOTES
[facility]                                  Skilled Nursing Facility                   Progress Note     Patient Name: Dimitri Johnson  YOB: 1933  MRN: [id number]  Room: [place] [id number]     Admit Date: 12/26/2023   PALOMO: 1/16/2024     Principal Problem:  Weakness    HPI obtained from patient interview and chart review     Chief Complaint: Re-evaluation of medical treatment and therapy status, malnutrition    HPI:   Dimitri Johnson is a 89 y.o. male admitted to [facility] 12/22 with weakness and constipation. He had associated stomach discomfort that was relieved after having a large bowel movement. Daily miralax and prn dulcolax regimen started. Patient has decreased mobility due to his degenerative disc disease and deconditioning. He is hesitant to walk and stand up. He worked with PT and OT who both recommended moderate intensity therapy.     Patient will be treated at [facility] with PT and OT to improve functional status and ability to perform ADLs.     Interval history:  24 hour chart review completed. Pertinent lab, micro, and/or radiology results addressed below. NAEON. NAD.   Vitals: Afebrile, hemodynamically stable. HTN this morning, resolved.  I/O: Patient reports fair appetite. Order is in place for assist with feedings due to blindness. Encourage fluids. Continue Remeron, supplements. LBM 01/02 per documentation, d/w nursing and advised admin of prn suppository. Ramirez output CYU. Will need urology f/u outpatient for retention and failed void trial. UA without nitrites or bacteria. U cx with coagulase negative staph, without susceptibility. Norco 5 mg Q 8 H p.r.n., doxycycline x7 days (EOC 1/6), continue finasteride. Urology clinic appointment requested of unit  via secure chat 1/3.  Skilled Therapy: Patient progressing with therapy as tolerated and would continue to benefit from skilled PT and OT services to  improve overall functional mobility and independence, strength, endurance, and safety.      Past Medical History: Patient has a past medical history of Glaucoma and Hypertension. 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    Social History: Patient reports that he has never smoked. He does not have any smokeless tobacco history on file. He reports current alcohol use.    Family History:  non contributory    Allergies: Patient is allergic to pcn [penicillins].        ROS  Constitutional:  Negative for fever.  Positive forgetfulness  HENT:  Negative for sore throat.    Eyes:  Negative for redness. + limited vision  Respiratory:  Negative for shortness of breath.    Cardiovascular:  Negative for chest pain.   Gastrointestinal:  Negative for nausea.   Genitourinary:  Negative for dysuria.   Musculoskeletal:  Negative for back pain.    Skin:  Negative for rash.   Neurological:  Positive for weakness.   Hematological:  Does not bruise/bleed easily.   Psychiatric/Behavioral: Negative insomnia      24 hour Vital Sign Range   Temp:  [97.6 °F (36.4 °C)-98.2 °F (36.8 °C)]   Pulse:  [69-70]   Resp:  [18]   BP: (120-127)/(58-60)   SpO2:  [97 %-98 %]       Physical Exam  General: Alert, frail, thin, oriented x4 forgetful at times  HENT: Atraumatic, normocephalic, Right eye keteroplasty/blind, left eye poor vision, hard of hearing  CV: RRR; no murmurs, Normal s1, s2, no peripheral edema  Resp: CTAB with normal work of breathing and chest excursion on room air.   Abd: Soft, NTND. Bowel sounds normoactive  MSK: Generalized weakness  : Mixed incontinence  Neuro: Normal appearing coordination and sensory function.   Skin: No rashes or lesions noted, dry, warm, dressing to left ankle,  dermatitis sacrum  Psych: calm, cooperative, yells out at time      "    Labs:  Recent Labs   Lab 01/02/24  0440 01/04/24  0553   WBC 9.46 8.35   HGB 14.0 13.2*   HCT 42.7 39.6*    318       Recent Labs   Lab 01/02/24  0440 01/04/24  0553    139   K 4.7 4.6    104   CO2 25 25   BUN 33* 46*   CREATININE 1.2 1.2   GLU 76 81   CALCIUM 9.2 8.9   MG 2.0 2.0   PHOS 3.6 3.8       No results for input(s): "ALKPHOS", "ALT", "AST", "ALBUMIN", "PROT", "BILITOT", "INR" in the last 168 hours.    No results for input(s): "POCTGLUCOSE" in the last 72 hours.    Meds Scheduled:   amLODIPine  2.5 mg Oral Daily    ascorbic acid (vitamin C)  500 mg Oral BID    aspirin  81 mg Oral Daily    calcium-vitamin D3  2 tablet Oral Daily    doxycycline  100 mg Oral Q12H    enoxparin  30 mg Subcutaneous Q24H (prophylaxis, 1700)    finasteride  5 mg Oral Daily    melatonin  6 mg Oral Nightly    mirtazapine  7.5 mg Oral QHS    polyethylene glycol  17 g Oral Daily    senna-docusate 8.6-50 mg  1 tablet Oral BID    tamsulosin  0.4 mg Oral Daily    zinc sulfate  220 mg Oral Daily       PRN:   acetaminophen, bisacodyL, calcium carbonate, HYDROcodone-acetaminophen, methocarbamoL      Assessment and Plan:    Severe malnutrition  Poor appetite  Body mass index is 18.08 kg/m²., previous weight unknown  RD following  Continue Remeron  Patient reports fair appetite.   Order in place for assist with feedings due to blindness.     Urinary retention  Ramirez placed  in ED for retention, discontinued prior to discharge.   Bladder scan q.shift x 3days   Urine culture with staph however previous culture negative, asymptomatic   vitamin C, finasteride  -12/29 Failed void trial, Ramirez replaced. Curious as to if recurrent constipation contributing to retention  - Avoid constipation  - Urology clinic appointment requested of unit  via secure chat 1/3.    Constipation  CT- Limited examination. Moderately large liquid stool within the patulous rectum.  Question possible diarrheal illness and/or fecal impaction.  " Left renal cyst.     Prostatomegaly.  Osseous changes particularly of the spine.  Miralax daily, senokot bid  Encourge oral intake  Avoid narcotics  -12/29 KUB no constipation  -1/1 Lactulose 30 g x 1   -1/2 + BM  1/5/2024: stable    Degenerative disc disease  Osteoporosis  Muscle spasms   Continue Robaxin QID PRN, ibuprofen p.r.n. Q 8 H x3 days  -12/29:  Initiate Norco 5 mg q8h PRN    Hyponatremia  Hypocalcemia  Dehydration  Encourage oral intake  Monitor on routine lab  Calcium with vitamin-D    Right lower extremity Baker's cyst  Noted on venous ultrasound  Continue ibuprofen prn pain, ice  Ambulatory referral to orthopedics    Insomnia  Scheduled melatonin  Remeron 7.5 mg nightly     Weakness  Debility  Patient uses walker at baseline. His son reported  prior to admit he was unable to get off of toilet and ambulate.    B12 WNL TSH 8.7 but T4 WNL   PT/OT consult   Fall precaution  Lovenox 30mg daily for VTE ppx    Moisture associated dermatitis sacrum   Triad BID   Consult wound care   Vitamin-C and zinc     Primary hypertension  Home Norvasc 5 mg b.i.d.  Decrease Norvasc to 2.5 mg daily    Anticipate disposition:    Home with home health    IP [facility] RISK OF UNPLANNED READMISSION Model: LOW      Follow-up needed during SNF admission:   Urology clinic appointment requested of unit  via secure chat 1/3.    Follow-up needed after discharge from SNF:   - PCP within 1-2 weeks  - See appt scheduled below     No future appointments.      I certify that SNF services are required to be given on an inpatient basis because Dimitri Johnson needs for skilled nursing care and/or skilled rehabilitation are required on a daily basis and such services can only practically be provided in a skilled nursing facility setting and are for an ongoing condition for which she received inpatient care in the hospital.       Extended Visit:   Total time spent: 32 minutes  Description of Time: counseling provided on clinical condition,  therapies provided, plan of care, emotional support, coordinating patient care with other care team members, reviewing and interpreting labs and imaging, collaboration with physician, initiating new orders, chart review, and documentation. See interval hx.       Nunu Suárez NP  Department of Hospital Medicine   [facility]     DOS: 1/5/2024       Patient note was created using MModal Dictation.  Any errors in syntax or even information may not have been identified and edited on initial review prior to signing this note.

## 2024-01-05 NOTE — CARE UPDATE
IDT, late message from 1.4.2024 meeting    SS contact called son to inform him of 1.16.2024 projected discharge date. Left a message for a return call if there are questions.  Son did not answer during IDT.  Pt lives with son and daughter listed on FS.

## 2024-01-05 NOTE — PROGRESS NOTES
Patient seen for wound care follow-up.  Reviewed chart for this encounter.   See Flow Sheet for findings.    Pt laying in bed, agreed to assessment. Pt on waffle overlay, properly inflated.  Pt wearing diaper, pt was able to turn self with minimal assistance. White cream removed using vashe and wash cloth. Previous open area appears to be resolved. No open wound noted. Pt at risk for pressure injury d/t previous wound-- bedside nurse continue to monitor.   Applied Triad to scar tissue.     RECOMMENDATIONS:  Bedside nursing to continue care & monitoring.  Bedside nursing to maintain pressure injury prevention interventions.    Discussed POC with patient and primary nurse.   See EMR for orders & patient education.    Discussed nutrition and the role of protein in wound healing with the patient. Instructed patient to optimize protein for wound healing.      WC sign off--no open wound.   Please re-consult if needed     01/05/24 0630   WOCN Assessment   WOCN Total Time (mins) 20   Visit Date 01/05/24   Visit Time 0630   Consult Type Follow Up   WOCN Speciality Wound   Wound moisture   Intervention assessed;changed;applied;chart review   Teaching on-going;complication        Altered Skin Integrity 12/28/23 0620 Buttocks Moisture associated dermatitis   Date First Assessed/Time First Assessed: 12/28/23 0620   Altered Skin Integrity Present on Admission - Did Patient arrive to the hospital with altered skin?: suspected hospital acquired  Location: Buttocks  Primary Wound Type: Moisture associated derm...   Wound Image    Dressing Appearance Open to air   Drainage Amount None   Drainage Characteristics/Odor No odor   Appearance Intact   Periwound Area Intact;Scar tissue   Wound Edges Approximated   Wound Length (cm) 0 cm   Wound Width (cm) 0 cm   Wound Depth (cm) 0 cm   Wound Volume (cm^3) 0 cm^3   Wound Surface Area (cm^2) 0 cm^2   Care Cleansed with:;Antimicrobial agent   Dressing Applied;Other (comment)  (Triad)   Off  Loading Other (see comments)  (Waffle)   Dressing Change Due 01/05/24

## 2024-01-05 NOTE — PLAN OF CARE
Problem: Occupational Therapy  Goal: Occupational Therapy Goal  Description: Goals to be met by: 1/17/2024     Patient will increase functional independence with ADLs by performing:    UE Dressing with Modified [place]- Ongoing  LE Dressing with Stand-by Assistance-Ongoing  Grooming while seated at sink with Modified [place]- Ongoing  Toileting from toilet with Stand-by Assistance for hygiene and clothing management.   Bathing from sitting/standing at sink with Stand-by Assistance.  Supine to sit with Modified [place].  Step transfer with Stand-by Assistance with RW.  Toilet transfer to toilet with Stand-by Assistance with RW.  Footwear /c Mod A and AE as needed  Tolerate standing functionals tasks for ~5 minutes /c CGA and RW as needed    Outcome: Ongoing, Progressing

## 2024-01-06 PROCEDURE — 11000004 HC SNF PRIVATE

## 2024-01-06 PROCEDURE — 97530 THERAPEUTIC ACTIVITIES: CPT | Mod: CQ

## 2024-01-06 PROCEDURE — 94761 N-INVAS EAR/PLS OXIMETRY MLT: CPT

## 2024-01-06 PROCEDURE — 63600175 PHARM REV CODE 636 W HCPCS: Performed by: FAMILY MEDICINE

## 2024-01-06 PROCEDURE — 97116 GAIT TRAINING THERAPY: CPT | Mod: CQ

## 2024-01-06 PROCEDURE — 25000003 PHARM REV CODE 250: Performed by: HOSPITALIST

## 2024-01-06 PROCEDURE — 25000003 PHARM REV CODE 250: Performed by: FAMILY MEDICINE

## 2024-01-06 RX ADMIN — HYDROCODONE BITARTRATE AND ACETAMINOPHEN 1 TABLET: 5; 325 TABLET ORAL at 04:01

## 2024-01-06 RX ADMIN — DOXYCYCLINE HYCLATE 100 MG: 100 TABLET, COATED ORAL at 07:01

## 2024-01-06 RX ADMIN — Medication 6 MG: at 09:01

## 2024-01-06 RX ADMIN — POLYETHYLENE GLYCOL 3350 17 G: 17 POWDER, FOR SOLUTION ORAL at 09:01

## 2024-01-06 RX ADMIN — Medication 500 MG: at 09:01

## 2024-01-06 RX ADMIN — SENNOSIDES AND DOCUSATE SODIUM 1 TABLET: 8.6; 5 TABLET ORAL at 09:01

## 2024-01-06 RX ADMIN — ENOXAPARIN SODIUM 30 MG: 30 INJECTION SUBCUTANEOUS at 04:01

## 2024-01-06 RX ADMIN — TAMSULOSIN HYDROCHLORIDE 0.4 MG: 0.4 CAPSULE ORAL at 09:01

## 2024-01-06 RX ADMIN — Medication 2 TABLET: at 09:01

## 2024-01-06 RX ADMIN — MIRTAZAPINE 7.5 MG: 7.5 TABLET, FILM COATED ORAL at 09:01

## 2024-01-06 RX ADMIN — FINASTERIDE 5 MG: 5 TABLET, FILM COATED ORAL at 09:01

## 2024-01-06 RX ADMIN — AMLODIPINE BESYLATE 2.5 MG: 2.5 TABLET ORAL at 09:01

## 2024-01-06 RX ADMIN — ASPIRIN 81 MG CHEWABLE TABLET 81 MG: 81 TABLET CHEWABLE at 09:01

## 2024-01-06 RX ADMIN — ZINC SULFATE 220 MG (50 MG) CAPSULE 220 MG: CAPSULE at 09:01

## 2024-01-06 NOTE — PT/OT/SLP PROGRESS
"Physical Therapy Treatment    Patient Name:  Dimitri Johnson   MRN:  [id number]  Admit Date: 12/26/2023  Admitting Diagnosis: Weakness  Recent Surgeries: N/A    General Precautions: Standard, fall, aspiration, vision impaired  Orthopedic Precautions: N/A  Braces: N/A    Recommendations:     Discharge Recommendations: home health PT  Level of Assistance Recommended at Discharge: 24 hours light assistance  Discharge Equipment Recommendations: 3-in-1 commode, wheelchair  Barriers to discharge: Decreased caregiver support    Assessment:     Dimitri Johnson is a 89 y.o. male admitted with a medical diagnosis of Weakness . Pt tolerated well, pt is very fearful of falling although pt amb with SBA/CGA. Pt asc/desc 4" curb with Min A d/t poor vision. Pt completed dynamic balance activity on blue foam mat and standing TE with CGA. PTA gave max positive encouragement to assist with confidence levels. Pt would continue to benefit from skilled PT services to improve overall functional mobility, strength and endurance.      Performance deficits affecting function: weakness, gait instability, impaired endurance, impaired balance, decreased safety awareness, pain, impaired self care skills, impaired functional mobility, decreased lower extremity function.    Rehab Potential is good    Activity Tolerance: Good    Plan:     Patient to be seen 5 x/week to address the above listed problems via gait training, therapeutic activities, therapeutic exercises, neuromuscular re-education, wheelchair management/training    Plan of Care Expires: 01/26/24  Plan of Care Reviewed with: patient    Subjective     "Not a little scared of falling, I'm very scared of falling".     Pain/Comfort:  Pain Rating 1: 0/10  Pain Rating Post-Intervention 1: 0/10    Patient's cultural, spiritual, Church conflicts given the current situation:  no    Objective:       Patient found up in chair with gray catheter upon PT entry to room.     Therapeutic Activities and " "Exercises: standing Te (blue foam mat): marches x 10 reps for Balance/ strengthening    Patient educated on role of therapy, goals of session, and benefits of out of bed mobility.   Instructed on use of call button and importance of calling nursing staff for assistance with mobility   Questions/concerns addressed within PTA scope of practice  Pt verbalized understanding    Functional Mobility:  Transfers:     Sit to Stand:  contact guard assistance and minimum assistance with rolling walker. Vc to scoot to edge of chair, hand placement and efficient sequencing  Gait: pt amb ~230 ft SBA/CGA with RW. Pt very fearful of falling. Pt amb with step through gait. No LOB noted. Vc for slower turns for safety. CGA for first lap around gym and SBA for rest. Pt educated on Assistance status to reassure pt's confidence levels.  Balance: pt completed dynamic standing balance with CGA and RW on Blue foam mat. Pt completed x 10 Bilateral marches, Modified tandem stance on BLE for ~15-20 sec each with posterior/anterior/ lateral weight shifts  Stairs:  Pt ascended/descended 4 stair(s) with No Assistive Device with bilateral handrails with Contact Guard Assistance.   4" curb: pt asc/desc curb with Min A and RW d/t poor vision. Tc and vc for completion    AM-PAC 6 CLICK MOBILITY  17    Patient left up in chair with  PT tech .    GOALS:   Multidisciplinary Problems       Physical Therapy Goals          Problem: Physical Therapy    Goal Priority Disciplines Outcome Goal Variances Interventions   Physical Therapy Goal     PT, PT/OT Ongoing, Progressing     Description: Goals to be met by: 24     Patient will increase functional independence with mobility by performin. Supine to sit with Supervision  2. Sit to supine with Supervision  3. Rolling to Left and Right with Supervision  4. Sit to stand transfer with Stand-by Assistance  5. Bed to chair transfer with Stand-by Assistance using Rolling Walker  6. Gait  x 150 feet with " Stand-by Assistance using Rolling Walker  7. Wheelchair propulsion x 150 feet with Modified [place] using bilateral upper extremities  *Continue to assess living environment for steps to enter home; set goals as appropriate                         Time Tracking:     PT Received On: 01/06/24  PT Start Time: 1119  PT Stop Time: 1158  PT Total Time (min): 39 min    Billable Minutes: Gait Training 23 and Therapeutic Activity 16    Treatment Type: Treatment  PT/PTA: PTA     Number of PTA visits since last PT visit: 1 01/06/2024

## 2024-01-06 NOTE — PLAN OF CARE
Problem: Adult Inpatient Plan of Care  Goal: Plan of Care Review  Outcome: Ongoing, Progressing  Flowsheets (Taken 1/6/2024 1329)  Plan of Care Reviewed With: patient     Problem: Adult Inpatient Plan of Care  Goal: Patient-Specific Goal (Individualized)  Flowsheets (Taken 1/6/2024 1329)  Individualized Care Needs: daily PT/OT  Goal: Absence of Hospital-Acquired Illness or Injury  Intervention: Identify and Manage Fall Risk  Flowsheets (Taken 1/6/2024 1329)  Safety Promotion/Fall Prevention:   assistive device/personal item within reach   Fall Risk reviewed with patient/family   lighting adjusted   side rails raised x 2   instructed to call staff for mobility  Intervention: Prevent Skin Injury  Flowsheets (Taken 1/6/2024 1329)  Body Position: weight shifting  Skin Protection:   skin-to-skin areas padded   protective footwear used  Intervention: Prevent and Manage VTE (Venous Thromboembolism) Risk  Flowsheets (Taken 1/6/2024 1329)  Activity Management: Up in chair - L3  VTE Prevention/Management:   bleeding risk assessed   fluids promoted

## 2024-01-06 NOTE — PROGRESS NOTES
[facility]                                  Skilled Nursing Facility                   Progress Note     Patient Name: Dimitri Johnson  YOB: 1933  MRN: [id number]  Room: [place] [id number] A     Admit Date: 12/26/2023   PALOMO: 1/16/2024     Principal Problem:  Weakness    HPI obtained from patient interview and chart review     Chief Complaint: Re-evaluation of medical treatment and therapy status, malnutrition, constipation, urinary retention    HPI:   Dimitri Johnson is a 89 y.o. male admitted to [facility] 12/22 with weakness and constipation. He had associated stomach discomfort that was relieved after having a large bowel movement. Daily miralax and prn dulcolax regimen started. Patient has decreased mobility due to his degenerative disc disease and deconditioning. He is hesitant to walk and stand up. He worked with PT and OT who both recommended moderate intensity therapy.     Patient will be treated at [facility] with PT and OT to improve functional status and ability to perform ADLs.     Interval history:  24 hour chart review completed. Pertinent lab, micro, and/or radiology results addressed below. NAEON. NAD.   Vitals: Afebrile, hemodynamically stable.   I/O: Patient reports fair appetite. Order is in place for assist with feedings due to blindness. Encourage fluids. Continue Remeron, supplements. LBM 01/02, patient refusing prn suppository. Nursing reports large BM this evening. This is first BM produced without aid of suppository since hospitalization. Previously failed voiding trial but was constipated at that time. 7 day doxy course for suspected UTI completed 1/6.  Will re-trial voiding tomorrow. Will need to see Urology in clinic if fails upcoming voiding trial. Continue finasteride.  Skilled Therapy: Patient progressing with therapy as tolerated and would continue to benefit from skilled PT and OT services to improve  overall functional mobility and independence, strength, endurance, and safety.      Past Medical History: Patient has a past medical history of Glaucoma and Hypertension. 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    Social History: Patient reports that he has never smoked. He does not have any smokeless tobacco history on file. He reports current alcohol use.    Family History:  non contributory    Allergies: Patient is allergic to pcn [penicillins].        ROS  Constitutional:  Negative for fever.  Positive forgetfulness  HENT:  Negative for sore throat.    Eyes:  Negative for redness. + limited vision  Respiratory:  Negative for shortness of breath.    Cardiovascular:  Negative for chest pain.   Gastrointestinal:  Negative for nausea.   Genitourinary:  Negative for dysuria.   Musculoskeletal:  Negative for back pain.    Skin:  Negative for rash.   Neurological:  Positive for weakness.   Hematological:  Does not bruise/bleed easily.   Psychiatric/Behavioral: Negative insomnia      24 hour Vital Sign Range   Temp:  [98.1 °F (36.7 °C)]   Pulse:  [68-72]   Resp:  [18]   BP: (114-131)/(60-62)   SpO2:  [96 %-98 %]       Physical Exam  General: Alert, frail, thin, oriented x4 forgetful at times  HENT: Atraumatic, normocephalic, Right eye keteroplasty/blind, left eye poor vision, hard of hearing  CV: RRR; no murmurs, Normal s1, s2, no peripheral edema  Resp: CTAB with normal work of breathing and chest excursion on room air.   Abd: Soft, NTND. Bowel sounds normoactive  MSK: Generalized weakness  : Mixed incontinence  Neuro: Normal appearing coordination and sensory function.   Skin: No rashes or lesions noted, dry, warm, dressing to left ankle,  dermatitis sacrum  Psych: calm, cooperative, yells out at time         Labs:  Recent Labs   Lab  "01/02/24  0440 01/04/24  0553   WBC 9.46 8.35   HGB 14.0 13.2*   HCT 42.7 39.6*    318       Recent Labs   Lab 01/02/24  0440 01/04/24  0553    139   K 4.7 4.6    104   CO2 25 25   BUN 33* 46*   CREATININE 1.2 1.2   GLU 76 81   CALCIUM 9.2 8.9   MG 2.0 2.0   PHOS 3.6 3.8       No results for input(s): "ALKPHOS", "ALT", "AST", "ALBUMIN", "PROT", "BILITOT", "INR" in the last 168 hours.    No results for input(s): "POCTGLUCOSE" in the last 72 hours.    Meds Scheduled:   amLODIPine  2.5 mg Oral Daily    ascorbic acid (vitamin C)  500 mg Oral BID    aspirin  81 mg Oral Daily    calcium-vitamin D3  2 tablet Oral Daily    enoxparin  30 mg Subcutaneous Q24H (prophylaxis, 1700)    finasteride  5 mg Oral Daily    melatonin  6 mg Oral Nightly    mirtazapine  7.5 mg Oral QHS    polyethylene glycol  17 g Oral Daily    senna-docusate 8.6-50 mg  1 tablet Oral BID    tamsulosin  0.4 mg Oral Daily    zinc sulfate  220 mg Oral Daily       PRN:   acetaminophen, bisacodyL, calcium carbonate, HYDROcodone-acetaminophen, methocarbamoL      Assessment and Plan:    Severe malnutrition  Poor appetite  Body mass index is 18.08 kg/m²., previous weight unknown  RD following  Continue Remeron  Patient reports fair appetite.   Order in place for assist with feedings due to blindness.     Urinary retention  Ramirez placed  in ED for retention, discontinued prior to discharge.   Bladder scan q.shift x 3days   Urine culture with staph however previous culture negative, asymptomatic   vitamin C, finasteride  -12/29 Failed void trial, Ramirez replaced. Curious as to if recurrent constipation contributing to retention  - Avoid constipation  - Urology clinic appointment requested of unit  via secure chat 1/3.  -  7 day doxy course for suspected UTI completed 1/6.    - Will re-trial voiding 1/7. Will need to see Urology in clinic if fails upcoming voiding trial.     Constipation  CT- Limited examination. Moderately large liquid " stool within the patulous rectum.  Question possible diarrheal illness and/or fecal impaction.  Left renal cyst.     Prostatomegaly.  Osseous changes particularly of the spine.  Miralax daily, senokot bid  Encourge oral intake  Avoid narcotics  -12/29 KUB no constipation  -1/1 Lactulose 30 g x 1   -1/2 + BM  1/6/2024: patient refusing prn suppository. Nursing reports large BM this evening. This is first BM produced without aid of suppository since hospitalization.    Degenerative disc disease  Osteoporosis  Muscle spasms   Continue Robaxin QID PRN, ibuprofen p.r.n. Q 8 H x3 days  -12/29:  Initiate Norco 5 mg q8h PRN    Hyponatremia  Hypocalcemia  Dehydration  Encourage oral intake  Monitor on routine lab  Calcium with vitamin-D    Right lower extremity Baker's cyst  Noted on venous ultrasound  Continue ibuprofen prn pain, ice  Ambulatory referral to orthopedics    Insomnia  Scheduled melatonin  Remeron 7.5 mg nightly     Weakness  Debility  Patient uses walker at baseline. His son reported  prior to admit he was unable to get off of toilet and ambulate.    B12 WNL TSH 8.7 but T4 WNL   PT/OT consult   Fall precaution  Lovenox 30mg daily for VTE ppx    Moisture associated dermatitis sacrum   Triad BID   Consult wound care   Vitamin-C and zinc     Primary hypertension  Home Norvasc 5 mg b.i.d.  Decrease Norvasc to 2.5 mg daily    Anticipate disposition:    Home with home health    IP [facility] RISK OF UNPLANNED READMISSION Model: LOW      Follow-up needed during SNF admission:   Urology clinic appointment requested of unit  via secure chat 1/3.    Follow-up needed after discharge from SNF:   - PCP within 1-2 weeks  - See appt scheduled below     No future appointments.      I certify that SNF services are required to be given on an inpatient basis because Dimitri BARNETT Alex needs for skilled nursing care and/or skilled rehabilitation are required on a daily basis and such services can only practically be provided in a  skilled nursing facility setting and are for an ongoing condition for which she received inpatient care in the hospital.       Extended Visit:   Total time spent: 31 minutes  Description of Time: counseling provided on clinical condition, therapies provided, plan of care, emotional support, coordinating patient care with other care team members, reviewing and interpreting labs and imaging, collaboration with physician, initiating new orders, chart review, and documentation. See interval hx.       Nunu Suárez NP  Department of Hospital Medicine   [facility]     DOS: 1/6/2024       Patient note was created using MModal Dictation.  Any errors in syntax or even information may not have been identified and edited on initial review prior to signing this note.

## 2024-01-06 NOTE — PLAN OF CARE
Problem: Adult Inpatient Plan of Care  Goal: Plan of Care Review  Outcome: Ongoing, Progressing  Flowsheets (Taken 1/5/2024 1825)  Plan of Care Reviewed With: patient     Problem: Adult Inpatient Plan of Care  Goal: Patient-Specific Goal (Individualized)  Flowsheets (Taken 1/5/2024 1825)  Individualized Care Needs: Daily PT/OT to regain strength to return back home  Goal: Absence of Hospital-Acquired Illness or Injury  Intervention: Identify and Manage Fall Risk  Flowsheets (Taken 1/5/2024 1825)  Safety Promotion/Fall Prevention:   assistive device/personal item within reach   Fall Risk reviewed with patient/family   lighting adjusted   side rails raised x 2  Intervention: Prevent Skin Injury  Flowsheets (Taken 1/5/2024 1825)  Body Position: weight shifting  Skin Protection:   incontinence pads utilized   protective footwear used   skin sealant/moisture barrier applied  Intervention: Prevent and Manage VTE (Venous Thromboembolism) Risk  Flowsheets (Taken 1/5/2024 1825)  Activity Management: Up in chair - L3  VTE Prevention/Management:   bleeding risk assessed   fluids promoted

## 2024-01-07 PROCEDURE — 63600175 PHARM REV CODE 636 W HCPCS: Performed by: FAMILY MEDICINE

## 2024-01-07 PROCEDURE — 25000003 PHARM REV CODE 250: Performed by: HOSPITALIST

## 2024-01-07 PROCEDURE — 11000004 HC SNF PRIVATE

## 2024-01-07 PROCEDURE — 25000003 PHARM REV CODE 250: Performed by: FAMILY MEDICINE

## 2024-01-07 RX ADMIN — SENNOSIDES AND DOCUSATE SODIUM 1 TABLET: 8.6; 5 TABLET ORAL at 09:01

## 2024-01-07 RX ADMIN — ASPIRIN 81 MG CHEWABLE TABLET 81 MG: 81 TABLET CHEWABLE at 09:01

## 2024-01-07 RX ADMIN — Medication 500 MG: at 09:01

## 2024-01-07 RX ADMIN — POLYETHYLENE GLYCOL 3350 17 G: 17 POWDER, FOR SOLUTION ORAL at 09:01

## 2024-01-07 RX ADMIN — TAMSULOSIN HYDROCHLORIDE 0.4 MG: 0.4 CAPSULE ORAL at 09:01

## 2024-01-07 RX ADMIN — AMLODIPINE BESYLATE 2.5 MG: 2.5 TABLET ORAL at 09:01

## 2024-01-07 RX ADMIN — FINASTERIDE 5 MG: 5 TABLET, FILM COATED ORAL at 09:01

## 2024-01-07 RX ADMIN — HYDROCODONE BITARTRATE AND ACETAMINOPHEN 1 TABLET: 5; 325 TABLET ORAL at 12:01

## 2024-01-07 RX ADMIN — Medication 2 TABLET: at 09:01

## 2024-01-07 RX ADMIN — METHOCARBAMOL 500 MG: 500 TABLET ORAL at 12:01

## 2024-01-07 RX ADMIN — ZINC SULFATE 220 MG (50 MG) CAPSULE 220 MG: CAPSULE at 09:01

## 2024-01-07 RX ADMIN — ENOXAPARIN SODIUM 30 MG: 30 INJECTION SUBCUTANEOUS at 06:01

## 2024-01-07 RX ADMIN — MIRTAZAPINE 7.5 MG: 7.5 TABLET, FILM COATED ORAL at 09:01

## 2024-01-07 RX ADMIN — Medication 6 MG: at 09:01

## 2024-01-07 NOTE — NURSING
Ramirez catheter removed as ordered. Discarded 700cc of yellow urine from  bag. Tolerated well. Informed patient and educated on voiding with urinal. Positioned urinal and practiced with patient as requested. Informed to call  when urgency appears.

## 2024-01-07 NOTE — PLAN OF CARE
Problem: Adult Inpatient Plan of Care  Goal: Plan of Care Review  Outcome: Ongoing, Progressing  Flowsheets (Taken 1/7/2024 1628)  Plan of Care Reviewed With: patient     Problem: Adult Inpatient Plan of Care  Goal: Patient-Specific Goal (Individualized)  Flowsheets (Taken 1/7/2024 1628)  Individualized Care Needs: Participate in daily PT/OT to regainstrenght to be able to return back home  Goal: Absence of Hospital-Acquired Illness or Injury  Intervention: Identify and Manage Fall Risk  Flowsheets (Taken 1/7/2024 1628)  Safety Promotion/Fall Prevention:   assistive device/personal item within reach   side rails raised x 2   nonskid shoes/socks when out of bed   lighting adjusted   Fall Risk reviewed with patient/family   instructed to call staff for mobility  Intervention: Prevent Skin Injury  Flowsheets (Taken 1/7/2024 1628)  Body Position: weight shifting  Skin Protection:   incontinence pads utilized   skin sealant/moisture barrier applied  Intervention: Prevent and Manage VTE (Venous Thromboembolism) Risk  Flowsheets (Taken 1/7/2024 1628)  VTE Prevention/Management:   bleeding precations maintained   bleeding risk assessed   fluids promoted

## 2024-01-07 NOTE — PLAN OF CARE
Problem: Adult Inpatient Plan of Care  Goal: Plan of Care Review  Outcome: Ongoing, Progressing  Goal: Patient-Specific Goal (Individualized)  Outcome: Ongoing, Progressing  Goal: Absence of Hospital-Acquired Illness or Injury  Outcome: Ongoing, Progressing  Intervention: Prevent Infection  Flowsheets (Taken 1/6/2024 2150)  Infection Prevention:   hand hygiene promoted   rest/sleep promoted   single patient room provided  Goal: Optimal Comfort and Wellbeing  Outcome: Ongoing, Progressing

## 2024-01-07 NOTE — PROGRESS NOTES
[facility]                                  Skilled Nursing Facility                   Progress Note     Patient Name: Dimitri Johnson  YOB: 1933  MRN: [id number]  Room: [place] [id number] A     Admit Date: 12/26/2023   PALOMO: 1/16/2024     Principal Problem:  Weakness    HPI obtained from patient interview and chart review     Chief Complaint: Re-evaluation of medical treatment and therapy status, malnutrition, constipation, urinary retention    HPI:   Dimitri Johnson is a 89 y.o. male admitted to [facility] 12/22 with weakness and constipation. He had associated stomach discomfort that was relieved after having a large bowel movement. Daily miralax and prn dulcolax regimen started. Patient has decreased mobility due to his degenerative disc disease and deconditioning. He is hesitant to walk and stand up. He worked with PT and OT who both recommended moderate intensity therapy.     Patient will be treated at [facility] with PT and OT to improve functional status and ability to perform ADLs.     Interval history:  24 hour chart review completed. Pertinent lab, micro, and/or radiology results addressed below. NAEON. NAD.   Vitals: Afebrile, hemodynamically stable.   I/O: Patient reports fair appetite. Order is in place for assist with feedings due to blindness. Encourage fluids. Continue Remeron, supplements. LBM 01/06 (large), was constipated x 4 days prior to that BM and refused suppository. patient refusing prn suppository. Previously failed voiding trial but was constipated at that time. 7 day doxy course for suspected UTI completed yesterday. Discontinue gray and re-trial voiding. Continue finasteride. He does have known prostatomegaly. Will need to see Urology in clinic if fails upcoming voiding trial.   Skilled Therapy: Patient progressing with therapy as tolerated and would continue to benefit from skilled PT and OT services to  improve overall functional mobility and independence, strength, endurance, and safety.      Past Medical History: Patient has a past medical history of Glaucoma and Hypertension. 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    Social History: Patient reports that he has never smoked. He does not have any smokeless tobacco history on file. He reports current alcohol use.    Family History:  non contributory    Allergies: Patient is allergic to pcn [penicillins].        ROS  Constitutional:  Negative for fever.  Positive forgetfulness  HENT:  Negative for sore throat.    Eyes:  Negative for redness. + limited vision  Respiratory:  Negative for shortness of breath.    Cardiovascular:  Negative for chest pain.   Gastrointestinal:  Negative for nausea.   Genitourinary:  Negative for dysuria.   Musculoskeletal:  Negative for back pain.    Skin:  Negative for rash.   Neurological:  Positive for weakness.   Hematological:  Does not bruise/bleed easily.   Psychiatric/Behavioral: Negative insomnia      24 hour Vital Sign Range   Temp:  [97.8 °F (36.6 °C)-98.1 °F (36.7 °C)]   Pulse:  [68-78]   Resp:  [8-18]   BP: (115-120)/(56-58)   SpO2:  [97 %-98 %]       Physical Exam  General: Alert, frail, thin, oriented x4 forgetful at times  HENT: Atraumatic, normocephalic, Right eye keteroplasty/blind, left eye poor vision, hard of hearing  CV: RRR; no murmurs, Normal s1, s2, no peripheral edema  Resp: CTAB with normal work of breathing and chest excursion on room air.   Abd: Soft, NTND. Bowel sounds normoactive  MSK: Generalized weakness  : Mixed incontinence  Neuro: Normal appearing coordination and sensory function.   Skin: No rashes or lesions noted, dry, warm, dressing to left ankle,  dermatitis sacrum  Psych: calm, cooperative, yells out at time      "    Labs:  Recent Labs   Lab 01/02/24  0440 01/04/24  0553   WBC 9.46 8.35   HGB 14.0 13.2*   HCT 42.7 39.6*    318       Recent Labs   Lab 01/02/24  0440 01/04/24  0553    139   K 4.7 4.6    104   CO2 25 25   BUN 33* 46*   CREATININE 1.2 1.2   GLU 76 81   CALCIUM 9.2 8.9   MG 2.0 2.0   PHOS 3.6 3.8       No results for input(s): "ALKPHOS", "ALT", "AST", "ALBUMIN", "PROT", "BILITOT", "INR" in the last 168 hours.    No results for input(s): "POCTGLUCOSE" in the last 72 hours.    Meds Scheduled:   amLODIPine  2.5 mg Oral Daily    ascorbic acid (vitamin C)  500 mg Oral BID    aspirin  81 mg Oral Daily    calcium-vitamin D3  2 tablet Oral Daily    enoxparin  30 mg Subcutaneous Q24H (prophylaxis, 1700)    finasteride  5 mg Oral Daily    melatonin  6 mg Oral Nightly    mirtazapine  7.5 mg Oral QHS    polyethylene glycol  17 g Oral Daily    senna-docusate 8.6-50 mg  1 tablet Oral BID    tamsulosin  0.4 mg Oral Daily    zinc sulfate  220 mg Oral Daily       PRN:   acetaminophen, bisacodyL, calcium carbonate, HYDROcodone-acetaminophen, methocarbamoL      Assessment and Plan:    Severe malnutrition  Poor appetite  Body mass index is 18.08 kg/m²., previous weight unknown  RD following  Continue Remeron  Patient reports fair appetite.   Order in place for assist with feedings due to blindness.     Urinary retention  Left renal cyst and Prostatomegaly found on recent CT  Gray placed  in ED for retention, discontinued prior to discharge.   Bladder scan q.shift x 3days   Urine culture with staph however previous culture negative, asymptomatic   vitamin C, finasteride  -12/29 Failed void trial, Gray replaced. Curious as to if recurrent constipation contributing to retention  - Avoid constipation  - Urology clinic appointment requested of unit  via secure chat 1/3.  -  7 day doxy course for suspected UTI completed 1/6.    - Discontinue gray and re-trial voiding 1/7.   - Will need to see Urology in " clinic if fails upcoming voiding trial.     Constipation  CT- Limited examination. Moderately large liquid stool within the patulous rectum.  Question possible diarrheal illness and/or fecal impaction. Left renal cyst. Prostatomegaly. Osseous changes particularly of the spine.  - Miralax daily, senokot bid  - Encourge oral intake, assist with eating and drinking d/t visual impairment  - Avoid narcotics  -12/29 KUB no constipation  -1/1 Lactulose 30 g x 1   -1/6 + BM (first BM produced without aid of suppository since hospitalization)    Degenerative disc disease  Osteoporosis  Muscle spasms   Continue Robaxin QID PRN, ibuprofen p.r.n. Q 8 H x3 days  -12/29:  Initiate Norco 5 mg q8h PRN    Hyponatremia  Hypocalcemia  Dehydration  Encourage oral intake  Monitor on routine lab  Calcium with vitamin-D    Right lower extremity Baker's cyst  Noted on venous ultrasound  Continue ibuprofen prn pain, ice  Ambulatory referral to orthopedics    Insomnia  Scheduled melatonin  Remeron 7.5 mg nightly     Weakness  Debility  Patient uses walker at baseline. His son reported  prior to admit he was unable to get off of toilet and ambulate.    B12 WNL TSH 8.7 but T4 WNL   PT/OT consult   Fall precaution  Lovenox 30mg daily for VTE ppx    Moisture associated dermatitis sacrum   Triad BID   Consult wound care   Vitamin-C and zinc     Primary hypertension  Home Norvasc 5 mg b.i.d.  Decrease Norvasc to 2.5 mg daily    Anticipate disposition:    Home with home health    IP [facility] RISK OF UNPLANNED READMISSION Model: LOW      Follow-up needed during SNF admission:   Urology clinic appointment requested of unit  via secure chat 1/3.    Follow-up needed after discharge from SNF:   - PCP within 1-2 weeks  - See appt scheduled below     No future appointments.      I certify that SNF services are required to be given on an inpatient basis because Dimitri Johnson needs for skilled nursing care and/or skilled rehabilitation are required on a  daily basis and such services can only practically be provided in a skilled nursing facility setting and are for an ongoing condition for which she received inpatient care in the hospital.       Extended Visit:   Total time spent: 31 minutes  Description of Time: counseling provided on clinical condition, therapies provided, plan of care, emotional support, coordinating patient care with other care team members, reviewing and interpreting labs and imaging, collaboration with physician, initiating new orders, chart review, and documentation. See interval hx.       Nunu Suárez NP  Department of Hospital Medicine   [facility] Nursing [facility]     DOS: 1/7/2024       Patient note was created using MModal Dictation.  Any errors in syntax or even information may not have been identified and edited on initial review prior to signing this note.

## 2024-01-07 NOTE — NURSING
Patient was offered to go to toilet to attempt to facilitate having a BM since receiving am dose of miralax and senna and refused. Informed patient that he may have a dulcolax supp to help facilitate a BM, patient refused. Will try later and inform on coming shift of attempts.

## 2024-01-08 LAB
ANION GAP SERPL CALC-SCNC: 8 MMOL/L (ref 8–16)
BASOPHILS # BLD AUTO: 0.07 K/UL (ref 0–0.2)
BASOPHILS NFR BLD: 0.8 % (ref 0–1.9)
BUN SERPL-MCNC: 37 MG/DL (ref 8–23)
CALCIUM SERPL-MCNC: 9.1 MG/DL (ref 8.7–10.5)
CHLORIDE SERPL-SCNC: 106 MMOL/L (ref 95–110)
CO2 SERPL-SCNC: 25 MMOL/L (ref 23–29)
CREAT SERPL-MCNC: 1.1 MG/DL (ref 0.5–1.4)
DIFFERENTIAL METHOD BLD: ABNORMAL
EOSINOPHIL # BLD AUTO: 0.3 K/UL (ref 0–0.5)
EOSINOPHIL NFR BLD: 3.5 % (ref 0–8)
ERYTHROCYTE [DISTWIDTH] IN BLOOD BY AUTOMATED COUNT: 13.7 % (ref 11.5–14.5)
EST. GFR  (NO RACE VARIABLE): >60 ML/MIN/1.73 M^2
GLUCOSE SERPL-MCNC: 93 MG/DL (ref 70–110)
HCT VFR BLD AUTO: 40.3 % (ref 40–54)
HGB BLD-MCNC: 12.7 G/DL (ref 14–18)
IMM GRANULOCYTES # BLD AUTO: 0.02 K/UL (ref 0–0.04)
IMM GRANULOCYTES NFR BLD AUTO: 0.2 % (ref 0–0.5)
LYMPHOCYTES # BLD AUTO: 1.8 K/UL (ref 1–4.8)
LYMPHOCYTES NFR BLD: 21.2 % (ref 18–48)
MAGNESIUM SERPL-MCNC: 2.1 MG/DL (ref 1.6–2.6)
MCH RBC QN AUTO: 31.5 PG (ref 27–31)
MCHC RBC AUTO-ENTMCNC: 31.5 G/DL (ref 32–36)
MCV RBC AUTO: 100 FL (ref 82–98)
MONOCYTES # BLD AUTO: 0.7 K/UL (ref 0.3–1)
MONOCYTES NFR BLD: 7.6 % (ref 4–15)
NEUTROPHILS # BLD AUTO: 5.7 K/UL (ref 1.8–7.7)
NEUTROPHILS NFR BLD: 66.7 % (ref 38–73)
NRBC BLD-RTO: 0 /100 WBC
PHOSPHATE SERPL-MCNC: 4.2 MG/DL (ref 2.7–4.5)
PLATELET # BLD AUTO: 270 K/UL (ref 150–450)
PMV BLD AUTO: 10.5 FL (ref 9.2–12.9)
POTASSIUM SERPL-SCNC: 4.7 MMOL/L (ref 3.5–5.1)
RBC # BLD AUTO: 4.03 M/UL (ref 4.6–6.2)
SODIUM SERPL-SCNC: 139 MMOL/L (ref 136–145)
WBC # BLD AUTO: 8.51 K/UL (ref 3.9–12.7)

## 2024-01-08 PROCEDURE — 11000004 HC SNF PRIVATE

## 2024-01-08 PROCEDURE — 25000003 PHARM REV CODE 250: Performed by: HOSPITALIST

## 2024-01-08 PROCEDURE — 80048 BASIC METABOLIC PNL TOTAL CA: CPT | Performed by: FAMILY MEDICINE

## 2024-01-08 PROCEDURE — 63600175 PHARM REV CODE 636 W HCPCS: Performed by: FAMILY MEDICINE

## 2024-01-08 PROCEDURE — 84100 ASSAY OF PHOSPHORUS: CPT | Performed by: FAMILY MEDICINE

## 2024-01-08 PROCEDURE — 83735 ASSAY OF MAGNESIUM: CPT | Performed by: FAMILY MEDICINE

## 2024-01-08 PROCEDURE — 97116 GAIT TRAINING THERAPY: CPT | Mod: CQ

## 2024-01-08 PROCEDURE — 36415 COLL VENOUS BLD VENIPUNCTURE: CPT | Performed by: FAMILY MEDICINE

## 2024-01-08 PROCEDURE — 85025 COMPLETE CBC W/AUTO DIFF WBC: CPT | Performed by: FAMILY MEDICINE

## 2024-01-08 PROCEDURE — 97530 THERAPEUTIC ACTIVITIES: CPT | Mod: CQ

## 2024-01-08 PROCEDURE — 97110 THERAPEUTIC EXERCISES: CPT

## 2024-01-08 PROCEDURE — 25000003 PHARM REV CODE 250: Performed by: FAMILY MEDICINE

## 2024-01-08 PROCEDURE — 97530 THERAPEUTIC ACTIVITIES: CPT

## 2024-01-08 RX ADMIN — Medication 2 TABLET: at 09:01

## 2024-01-08 RX ADMIN — Medication 500 MG: at 08:01

## 2024-01-08 RX ADMIN — AMLODIPINE BESYLATE 2.5 MG: 2.5 TABLET ORAL at 09:01

## 2024-01-08 RX ADMIN — Medication 500 MG: at 09:01

## 2024-01-08 RX ADMIN — SENNOSIDES AND DOCUSATE SODIUM 1 TABLET: 8.6; 5 TABLET ORAL at 09:01

## 2024-01-08 RX ADMIN — SENNOSIDES AND DOCUSATE SODIUM 1 TABLET: 8.6; 5 TABLET ORAL at 08:01

## 2024-01-08 RX ADMIN — TAMSULOSIN HYDROCHLORIDE 0.4 MG: 0.4 CAPSULE ORAL at 09:01

## 2024-01-08 RX ADMIN — ENOXAPARIN SODIUM 30 MG: 30 INJECTION SUBCUTANEOUS at 04:01

## 2024-01-08 RX ADMIN — ZINC SULFATE 220 MG (50 MG) CAPSULE 220 MG: CAPSULE at 09:01

## 2024-01-08 RX ADMIN — ASPIRIN 81 MG CHEWABLE TABLET 81 MG: 81 TABLET CHEWABLE at 09:01

## 2024-01-08 RX ADMIN — MIRTAZAPINE 7.5 MG: 7.5 TABLET, FILM COATED ORAL at 08:01

## 2024-01-08 RX ADMIN — FINASTERIDE 5 MG: 5 TABLET, FILM COATED ORAL at 09:01

## 2024-01-08 RX ADMIN — POLYETHYLENE GLYCOL 3350 17 G: 17 POWDER, FOR SOLUTION ORAL at 09:01

## 2024-01-08 RX ADMIN — Medication 6 MG: at 08:01

## 2024-01-08 NOTE — PT/OT/SLP PROGRESS
"Physical Therapy Treatment    Patient Name:  Dimitri Johnson   MRN:  [id number]  Admit Date: 12/26/2023  Admitting Diagnosis: Weakness  Recent Surgeries: N/A    General Precautions: Standard, fall, aspiration, vision impaired  Orthopedic Precautions: N/A  Braces: N/A    Recommendations:     Discharge Recommendations: home health PT  Level of Assistance Recommended at Discharge: 24 hours light assistance  Discharge Equipment Recommendations: 3-in-1 commode, wheelchair  Barriers to discharge: Decreased caregiver support    Assessment:     Dimitri Johnson is a 89 y.o. male admitted with a medical diagnosis of Weakness . Pt tolerated well, pt amb x 1 trial SBA/CGA. Pt requires moderate verbal cues throughout session due to anxiety when upright. Pt requested to go to restroom upon entry to gym. Pt asc/desc 4 stairs with CGA. Improvement with sequencing when STS from wheelchair to RW, although multiple vc, tc and demos for proper completion. Pt would continue to benefit from skilled PT services to improve overall functional mobility, strength and endurance.     Performance deficits affecting function: weakness, gait instability, impaired endurance, impaired balance, decreased safety awareness, pain, impaired self care skills, impaired functional mobility, decreased lower extremity function.    Rehab Potential is good    Activity Tolerance: Good    Plan:     Patient to be seen 5 x/week to address the above listed problems via gait training, therapeutic activities, therapeutic exercises, neuromuscular re-education, wheelchair management/training    Plan of Care Expires: 01/26/24  Plan of Care Reviewed with: patient    Subjective     "I think I need to go to the restroom but I'm not sure".     Pain/Comfort:  Pain Rating 1: 0/10  Pain Rating Post-Intervention 1: 0/10    Patient's cultural, spiritual, Church conflicts given the current situation:  no    Objective:      Patient found HOB elevated with  (no lines) upon PT entry to " room.     Therapeutic Activities and Exercises:   Patient educated on role of therapy, goals of session, and benefits of out of bed mobility.   Instructed on use of call button and importance of calling nursing staff for assistance with mobility   Questions/concerns addressed within PTA scope of practice  Pt verbalized understanding    Donning/ doffing pants/ brief sitting/standing edge of toilet with Grab bars / wheelchair arm rests. Tc and vc for hand placement     Functional Mobility:  Bed Mobility:     Scooting: stand by assistance toward EOB, HOB flat no rail  Supine to Sit: stand by assistance, HOB slightly elevated with rail  Transfers:     Sit to Stand:  contact guard assistance and minimum assistance with rolling walker. Max vc for efficient technique, multiple trials throughout session  Bed to Chair: contact guard assistance with  rolling walker  using  Stand Pivot  Toilet Transfer: contact guard assistance with  grab bars  using  Stand Pivot  Gait: pt amb ~150 ft SBA/CGA with RW, pt continues to be anxious when ambulating but better tolerance today. Vc for upright posture and staying within LAITH of RW.   Stairs:  Pt ascended/descended 4 stair(s) with No Assistive Device with bilateral handrails with Contact Guard Assistance.     AM-PAC 6 CLICK MOBILITY  17    Patient left up in chair with call button in reach.    GOALS:   Multidisciplinary Problems       Physical Therapy Goals          Problem: Physical Therapy    Goal Priority Disciplines Outcome Goal Variances Interventions   Physical Therapy Goal     PT, PT/OT Ongoing, Progressing     Description: Goals to be met by: 24     Patient will increase functional independence with mobility by performin. Supine to sit with Supervision  2. Sit to supine with Supervision  3. Rolling to Left and Right with Supervision  4. Sit to stand transfer with Stand-by Assistance  5. Bed to chair transfer with Stand-by Assistance using Rolling Walker  6. Gait  x  150 feet with Stand-by Assistance using Rolling Walker  7. Wheelchair propulsion x 150 feet with Modified [place] using bilateral upper extremities  8. New Goal 1/8/24: Ascend/descend 4 inch curb step using RW with SBA  9. New Goal 1/8/24: Ascend/descend 4 steps using BHR with SBA (progress number of steps as tolerated)                         Time Tracking:     PT Received On: 01/08/24  PT Start Time: 1346  PT Stop Time: 1436  PT Total Time (min): 50 min    Billable Minutes: Gait Training 18 and Therapeutic Activity 32    Treatment Type: Treatment  PT/PTA: PTA     Number of PTA visits since last PT visit: 2     01/08/2024

## 2024-01-08 NOTE — PLAN OF CARE
Problem: Adult Inpatient Plan of Care  Goal: Plan of Care Review  Outcome: Ongoing, Progressing  Goal: Patient-Specific Goal (Individualized)  Outcome: Ongoing, Progressing  Goal: Absence of Hospital-Acquired Illness or Injury  Outcome: Ongoing, Progressing  Intervention: Prevent Infection  Flowsheets (Taken 1/7/2024 2101)  Infection Prevention:   hand hygiene promoted   rest/sleep promoted   single patient room provided  Goal: Optimal Comfort and Wellbeing  Outcome: Ongoing, Progressing  Goal: Readiness for Transition of Care  Outcome: Ongoing, Progressing

## 2024-01-08 NOTE — PROGRESS NOTES
[facility]                                  Skilled Nursing Facility                   Progress Note     Patient Name: Dimitri Johnson  YOB: 1933  MRN: [id number]  Room: [id number]/[id number] A     Admit Date: 12/26/2023   PALOMO: 1/16/2024     Principal Problem:  Weakness    HPI obtained from patient interview and chart review     Chief Complaint: Re-evaluation of medical treatment and therapy status, lab review    HPI:   Dimitri Johnson is a 89 y.o. male admitted to [facility] 12/22 with weakness and constipation. He had associated stomach discomfort that was relieved after having a large bowel movement. Daily miralax and prn dulcolax regimen started. Patient has decreased mobility due to his degenerative disc disease and deconditioning. He is hesitant to walk and stand up. He worked with PT and OT who both recommended moderate intensity therapy.     Patient will be treated at [facility] with PT and OT to improve functional status and ability to perform ADLs.     Interval history:  24 hour chart review completed. Pertinent lab, micro, and/or radiology results addressed below. NAEON. NAD.   Vitals: Afebrile, hemodynamically stable.   Labs: Bun 37 Encourage fluids.  I/O: Patient reports fair appetite. Order is in place for assist with feedings due to blindness.  Continue Remeron, supplements. LBM 01/06, voiding adequately using urinal  Skilled Therapy: Patient progressing with therapy as tolerated and would continue to benefit from skilled PT and OT services to improve overall functional mobility and independence, strength, endurance, and safety.      Past Medical History: Patient has a past medical history of Glaucoma and Hypertension. 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    Social History: Patient reports that he has never smoked. He does not have any smokeless tobacco history on file. He reports current alcohol use.    Family History:  non contributory    Allergies: Patient is allergic to pcn [penicillins].        ROS  Constitutional:  Negative for fever.  Positive forgetfulness  HENT:  Negative for sore throat.    Eyes:  Negative for redness. + limited vision  Respiratory:  Negative for shortness of breath.    Cardiovascular:  Negative for chest pain.   Gastrointestinal:  Negative for nausea.   Genitourinary:  Negative for dysuria.   Musculoskeletal:  Negative for back pain.    Skin:  Negative for rash.   Neurological:  Positive for weakness.   Hematological:  Does not bruise/bleed easily.   Psychiatric/Behavioral: Negative insomnia      24 hour Vital Sign Range   Temp:  [98.3 °F (36.8 °C)-98.4 °F (36.9 °C)]   Pulse:  [71-74]   Resp:  [18]   BP: (126-131)/(61-64)   SpO2:  [97 %]       Physical Exam  General: Alert, frail, thin, oriented x4 forgetful at times  HENT: Atraumatic, normocephalic, Right eye keteroplasty/blind, left eye poor vision, hard of hearing  CV: RRR; no murmurs, Normal s1, s2, no peripheral edema  Resp: CTAB with normal work of breathing and chest excursion on room air.   Abd: Soft, NTND. Bowel sounds normoactive  MSK: Generalized weakness  : Mixed incontinence  Neuro: Normal appearing coordination and sensory function.   Skin: No rashes or lesions noted, dry, warm, dressing to left ankle,  dermatitis sacrum  Psych: calm, cooperative, yells out at time         Labs:  Recent Labs   Lab 01/02/24  0440 01/04/24  0553 01/08/24  0706   WBC 9.46 8.35 8.51   HGB 14.0 13.2* 12.7*   HCT 42.7 39.6* 40.3    318 270       Recent Labs   Lab 01/02/24  0440 01/04/24  0553 01/08/24  0706    139 139   K 4.7 4.6 4.7    104 106   CO2 25 25 25   BUN 33* 46* 37*   CREATININE 1.2 1.2 1.1   GLU 76 81 93   CALCIUM 9.2 8.9 9.1   MG 2.0 2.0 2.1   PHOS 3.6  "3.8 4.2       No results for input(s): "ALKPHOS", "ALT", "AST", "ALBUMIN", "PROT", "BILITOT", "INR" in the last 168 hours.    No results for input(s): "POCTGLUCOSE" in the last 72 hours.    Meds Scheduled:   amLODIPine  2.5 mg Oral Daily    ascorbic acid (vitamin C)  500 mg Oral BID    aspirin  81 mg Oral Daily    calcium-vitamin D3  2 tablet Oral Daily    enoxparin  30 mg Subcutaneous Q24H (prophylaxis, 1700)    finasteride  5 mg Oral Daily    melatonin  6 mg Oral Nightly    mirtazapine  7.5 mg Oral QHS    polyethylene glycol  17 g Oral Daily    senna-docusate 8.6-50 mg  1 tablet Oral BID    tamsulosin  0.4 mg Oral Daily    zinc sulfate  220 mg Oral Daily       PRN:   acetaminophen, bisacodyL, calcium carbonate, HYDROcodone-acetaminophen, methocarbamoL      Assessment and Plan:    Severe malnutrition  Poor appetite  Body mass index is 19.34 kg/m²., previous weight unknown  RD following  Continue Remeron  Patient reports fair appetite.   Order in place for assist with feedings due to blindness.     Urinary retention  Left renal cyst and Prostatomegaly found on recent CT needs Urology outpatient   Urine culture with staph however previous culture negative, asymptomatic   vitamin C, finasteride  -12/29 Failed void trial, Ramirez replaced.   - Avoid constipation  - Urology clinic appointment requested of unit  via secure chat 1/3.  -  7 day doxy course for suspected UTI completed 1/6.    - 1/8 currently voiding using urinal, p.r.n. bladder scan    Constipation  CT- Limited examination. Moderately large liquid stool within the patulous rectum.  Question possible diarrheal illness and/or fecal impaction. Left renal cyst. Prostatomegaly. Osseous changes particularly of the spine.  - Miralax daily, senokot bid  - Encourge oral intake, assist with eating and drinking d/t visual impairment  - Avoid narcotics  -12/29 KUB no constipation  -1/1 Lactulose 30 g x 1   -1/6 + BM (first BM produced without aid of suppository " since hospitalization)    Degenerative disc disease  Osteoporosis  Muscle spasms   Continue Robaxin QID PRN, ibuprofen p.r.n. Q 8 H x3 days  -12/29:  Initiate Norco 5 mg q8h PRN    Hyponatremia  Hypocalcemia  Dehydration  Encourage oral intake  Monitor on routine lab  Calcium with vitamin-D    Right lower extremity Baker's cyst  Noted on venous ultrasound  Continue ibuprofen prn pain, ice  Ambulatory referral to orthopedics    Insomnia  Scheduled melatonin  Remeron 7.5 mg nightly     Weakness  Debility  Patient uses walker at baseline. His son reported  prior to admit he was unable to get off of toilet and ambulate.    B12 WNL TSH 8.7 but T4 WNL   PT/OT consult   Fall precaution  Lovenox 30mg daily for VTE ppx    Moisture associated dermatitis sacrum   Triad BID   Consult wound care   Vitamin-C and zinc     Primary hypertension  Home Norvasc 5 mg b.i.d.  Decrease Norvasc to 2.5 mg daily to avoid constipation    Anticipate disposition:    Home with home health    IP [facility] RISK OF UNPLANNED READMISSION Model: LOW      Follow-up needed during SNF admission:   Urology clinic appointment requested of unit  via secure chat 1/3.    Follow-up needed after discharge from SNF:   - PCP within 1-2 weeks  - See appt scheduled below     No future appointments.      I certify that SNF services are required to be given on an inpatient basis because Dimitri Johnson needs for skilled nursing care and/or skilled rehabilitation are required on a daily basis and such services can only practically be provided in a skilled nursing facility setting and are for an ongoing condition for which she received inpatient care in the hospital.       Extended Visit:   Total time spent: 31 minutes  Description of Time: counseling provided on clinical condition, therapies provided, plan of care, emotional support, coordinating patient care with other care team members, reviewing and interpreting labs and imaging, collaboration with physician,  initiating new orders, chart review, and documentation. See interval hx.       SADIE GÓMEZ  Department of Hospital Medicine   [facility]     DOS: 1/8/2024       Patient note was created using MModal Dictation.  Any errors in syntax or even information may not have been identified and edited on initial review prior to signing this note.

## 2024-01-08 NOTE — PT/OT/SLP PROGRESS
"Occupational Therapy   Treatment    Name: Dimitri Johnson  MRN: [id number]  Admit Date: 12/26/2023  Admitting Diagnosis:  Weakness    General Precautions: Standard, fall, aspiration, vision impaired (Simultaneous filing. User may not have seen previous data.)   Orthopedic Precautions: N/A (Simultaneous filing. User may not have seen previous data.)   Braces: N/A (Simultaneous filing. User may not have seen previous data.)    Recommendations:     Discharge Recommendations:  home health OT  Level of Assistance Recommended at Discharge: 24 hours light assistance for ADL's and homemaking tasks  Discharge Equipment Recommendations: wheelchair, 3-in-1 commode  Barriers to discharge:   (increased A needed for mobility)    Assessment:     Dimitri Johnson is a 89 y.o. male with a medical diagnosis of Weakness. Pt tolerated session well and without incident and shows excellent motivation and potential to improve, but the pt continues to require assistance to perform self-care tasks and mobility. Pt strengths include Attention to task and Motivation and Willingness to participate. Pt improved Dynamic standing balance and Standing tolerance. However, pt would continue to benefit from cont'd OT services in the SNF setting to improve safety and independence /c functional tasks and ADLs upon discharge.     Performance deficits affecting function are weakness, impaired endurance, impaired self care skills, impaired functional mobility, gait instability, decreased lower extremity function, decreased safety awareness, impaired cognition, impaired coordination, decreased ROM, impaired skin.     Rehab Potential is good    Activity tolerance:  Good    Plan:     Patient to be seen 5 x/week to address the above listed problems via self-care/home management, therapeutic activities, therapeutic exercises    Plan of Care Expires: 01/25/24  Plan of Care Reviewed with: patient    Subjective     Communicated with: MERLINE prior to session.     "If you would " "have told me I would be doing arm exercises at 89 years old, I would have told you that you have the wrong aayush!" .    Pain/Comfort:  Pain Rating 1: 0/10 (Simultaneous filing. User may not have seen previous data.)  Pain Rating Post-Intervention 1: 0/10 (Simultaneous filing. User may not have seen previous data.)    Patient's cultural, spiritual, Faith conflicts given the current situation:  no    Objective:     Patient found up in chair with  (no lines) upon OT entry to room. Pt alert and agreeable to therapy.       Functional Mobility/Transfers:  Patient completed Sit <> Stand Transfer with minimum assistance  with  rolling walker and vcs for sequencing/proper use of RW.         [facility] 6 Click ADL: 17    OT Exercises:     ~Pt participated in functional cognition, dynamic standing balance, and standing tolerance task. Pt asked to copy and create patterns made /c multi-colored pegs placed in pegboard. Trials graded to increase use of abstract thought and complexity of sequencing. Pt able to tolerate standing for ~9 minutes /c CGA and use of RW /c LUE while using RUE to complete task    ~Pt performed 1x10 reps of the following exercises with 2 lb dumbbells while seated using BUE.     Chest presses, Shoulder presses, Biceps curls, Shoulder flexion, and Shoulder horizontal abduction     ~Pt participated in 13 minute UBE activity seated in WC at moderate resistance to address endurance and strength of UE to improve performance of ADLs and other functional tasks.       Treatment & Education:  Pt educated on POC, role of OT, safety, and proper body mechanics for performing functional transfers. Pt performed tasks to improve safety and independence in functional tasks and ADLs as mentioned above.       Patient left up in chair with  cynthia Gonzalezah present and returning to room, all needs met    GOALS:   Multidisciplinary Problems       Occupational Therapy Goals          Problem: Occupational Therapy    Goal Priority " Disciplines Outcome Interventions   Occupational Therapy Goal     OT, PT/OT Ongoing, Progressing    Description: Goals to be met by: 1/17/2024     Patient will increase functional independence with ADLs by performing:    UE Dressing with Modified [place]- Ongoing  LE Dressing with Stand-by Assistance-Ongoing  Grooming while seated at sink with Modified [place]- Ongoing  Toileting from toilet with Stand-by Assistance for hygiene and clothing management.   Bathing from sitting/standing at sink with Stand-by Assistance.  Supine to sit with Modified [place].  Step transfer with Stand-by Assistance with RW.  Toilet transfer to toilet with Stand-by Assistance with RW.  Footwear /c Mod A and AE as needed  Tolerate standing functionals tasks for ~5 minutes /c CGA and RW as needed                         Time Tracking:     OT Date of Treatment: 01/08/24  OT Start Time: 1448    OT Stop Time: 1526  OT Total Time (min): 38 min    Billable Minutes:Therapeutic Activity 15  Therapeutic Exercise 23    1/8/2024

## 2024-01-09 LAB
INFLUENZA A, MOLECULAR: NEGATIVE
INFLUENZA B, MOLECULAR: NEGATIVE
SPECIMEN SOURCE: NORMAL

## 2024-01-09 PROCEDURE — 25000003 PHARM REV CODE 250: Performed by: FAMILY MEDICINE

## 2024-01-09 PROCEDURE — 90471 IMMUNIZATION ADMIN: CPT | Performed by: HOSPITALIST

## 2024-01-09 PROCEDURE — 63600175 PHARM REV CODE 636 W HCPCS: Performed by: FAMILY MEDICINE

## 2024-01-09 PROCEDURE — 87502 INFLUENZA DNA AMP PROBE: CPT | Performed by: HOSPITALIST

## 2024-01-09 PROCEDURE — 25000003 PHARM REV CODE 250: Performed by: HOSPITALIST

## 2024-01-09 PROCEDURE — 90694 VACC AIIV4 NO PRSRV 0.5ML IM: CPT | Performed by: HOSPITALIST

## 2024-01-09 PROCEDURE — 97530 THERAPEUTIC ACTIVITIES: CPT | Mod: CO

## 2024-01-09 PROCEDURE — 11000004 HC SNF PRIVATE

## 2024-01-09 PROCEDURE — 97116 GAIT TRAINING THERAPY: CPT | Mod: CQ

## 2024-01-09 PROCEDURE — 97530 THERAPEUTIC ACTIVITIES: CPT | Mod: CQ

## 2024-01-09 PROCEDURE — 63600175 PHARM REV CODE 636 W HCPCS: Performed by: HOSPITALIST

## 2024-01-09 PROCEDURE — 97110 THERAPEUTIC EXERCISES: CPT | Mod: CO

## 2024-01-09 PROCEDURE — G0008 ADMIN INFLUENZA VIRUS VAC: HCPCS | Performed by: HOSPITALIST

## 2024-01-09 PROCEDURE — 97110 THERAPEUTIC EXERCISES: CPT | Mod: CQ

## 2024-01-09 RX ORDER — AMOXICILLIN 250 MG
2 CAPSULE ORAL 2 TIMES DAILY
Status: DISCONTINUED | OUTPATIENT
Start: 2024-01-09 | End: 2024-01-23 | Stop reason: HOSPADM

## 2024-01-09 RX ORDER — HYDRALAZINE HYDROCHLORIDE 50 MG/1
50 TABLET, FILM COATED ORAL EVERY 8 HOURS PRN
Status: DISCONTINUED | OUTPATIENT
Start: 2024-01-09 | End: 2024-01-23 | Stop reason: HOSPADM

## 2024-01-09 RX ORDER — OSELTAMIVIR PHOSPHATE 30 MG/1
30 CAPSULE ORAL DAILY
Status: COMPLETED | OUTPATIENT
Start: 2024-01-09 | End: 2024-01-22

## 2024-01-09 RX ORDER — POLYETHYLENE GLYCOL 3350 17 G/17G
17 POWDER, FOR SOLUTION ORAL 2 TIMES DAILY
Status: DISCONTINUED | OUTPATIENT
Start: 2024-01-09 | End: 2024-01-23 | Stop reason: HOSPADM

## 2024-01-09 RX ADMIN — HYDROCODONE BITARTRATE AND ACETAMINOPHEN 1 TABLET: 5; 325 TABLET ORAL at 11:01

## 2024-01-09 RX ADMIN — ASPIRIN 81 MG CHEWABLE TABLET 81 MG: 81 TABLET CHEWABLE at 09:01

## 2024-01-09 RX ADMIN — Medication 500 MG: at 08:01

## 2024-01-09 RX ADMIN — SENNOSIDES AND DOCUSATE SODIUM 1 TABLET: 8.6; 5 TABLET ORAL at 10:01

## 2024-01-09 RX ADMIN — Medication 6 MG: at 08:01

## 2024-01-09 RX ADMIN — TAMSULOSIN HYDROCHLORIDE 0.4 MG: 0.4 CAPSULE ORAL at 09:01

## 2024-01-09 RX ADMIN — MIRTAZAPINE 7.5 MG: 7.5 TABLET, FILM COATED ORAL at 08:01

## 2024-01-09 RX ADMIN — ZINC SULFATE 220 MG (50 MG) CAPSULE 220 MG: CAPSULE at 09:01

## 2024-01-09 RX ADMIN — FINASTERIDE 5 MG: 5 TABLET, FILM COATED ORAL at 09:01

## 2024-01-09 RX ADMIN — Medication 2 TABLET: at 09:01

## 2024-01-09 RX ADMIN — ENOXAPARIN SODIUM 30 MG: 30 INJECTION SUBCUTANEOUS at 04:01

## 2024-01-09 RX ADMIN — HYDROCODONE BITARTRATE AND ACETAMINOPHEN 1 TABLET: 5; 325 TABLET ORAL at 09:01

## 2024-01-09 RX ADMIN — OSELTAMIVIR PHOSPHATE 30 MG: 30 CAPSULE ORAL at 05:01

## 2024-01-09 RX ADMIN — POLYETHYLENE GLYCOL 3350 17 G: 17 POWDER, FOR SOLUTION ORAL at 08:01

## 2024-01-09 RX ADMIN — POLYETHYLENE GLYCOL 3350 17 G: 17 POWDER, FOR SOLUTION ORAL at 10:01

## 2024-01-09 RX ADMIN — INFLUENZA A VIRUS A/VICTORIA/4897/2022 IVR-238 (H1N1) ANTIGEN (FORMALDEHYDE INACTIVATED), INFLUENZA A VIRUS A/DARWIN/6/2021 IVR-227 (H3N2) ANTIGEN (FORMALDEHYDE INACTIVATED), INFLUENZA B VIRUS B/AUSTRIA/1359417/2021 BVR-26 ANTIGEN (FORMALDEHYDE INACTIVATED), INFLUENZA B VIRUS B/PHUKET/3073/2013 BVR-1B ANTIGEN (FORMALDEHYDE INACTIVATED) 0.5 ML: 15; 15; 15; 15 INJECTION, SUSPENSION INTRAMUSCULAR at 04:01

## 2024-01-09 RX ADMIN — SENNOSIDES AND DOCUSATE SODIUM 2 TABLET: 8.6; 5 TABLET ORAL at 08:01

## 2024-01-09 RX ADMIN — Medication 500 MG: at 09:01

## 2024-01-09 RX ADMIN — AMLODIPINE BESYLATE 2.5 MG: 2.5 TABLET ORAL at 10:01

## 2024-01-09 NOTE — PT/OT/SLP PROGRESS
"Physical Therapy Treatment    Patient Name:  Dimitri Johnson   MRN:  [id number]  Admit Date: 12/26/2023  Admitting Diagnosis: Weakness  Recent Surgeries: N/A    General Precautions: Standard, fall, aspiration, vision impaired  Orthopedic Precautions: N/A  Braces: N/A    Recommendations:     Discharge Recommendations: home health PT  Level of Assistance Recommended at Discharge: 24 hours light assistance  Discharge Equipment Recommendations: 3-in-1 commode, wheelchair  Barriers to discharge: Decreased caregiver support    Assessment:     Dimitri Johnson is a 89 y.o. male admitted with a medical diagnosis of Weakness . Pt tolerated well, pt amb x 1 trial with SBA/CGA and RW. Pt asc/desc 4 stairs and 4" curb with CGA. Pt propelled LBE to tolerance and presents with less anxiety when attempting ambulation/ STS today. Pt propelled w/c to tolerance, is progressing and would continue to benefit from skilled PT services to improve overall functional mobility, strength and endurance.      Performance deficits affecting function: weakness, gait instability, impaired endurance, impaired balance, decreased safety awareness, pain, impaired self care skills, impaired functional mobility, decreased lower extremity function.    Rehab Potential is good    Activity Tolerance: Good    Plan:     Patient to be seen 5 x/week to address the above listed problems via gait training, therapeutic activities, therapeutic exercises, neuromuscular re-education, wheelchair management/training    Plan of Care Expires: 01/26/24  Plan of Care Reviewed with: patient    Subjective     Pt agreeable for PT.     Pain/Comfort:  Pain Rating 1: 0/10  Pain Rating Post-Intervention 1: 0/10    Patient's cultural, spiritual, Shinto conflicts given the current situation:  no    Objective:      Patient found up in chair with  (no lines) upon Handoff from OT    Therapeutic Activities and Exercises: LBE X 10 min For BLE strengthening, endurance and ROM. Mod " "resistance    Patient educated on role of therapy, goals of session, and benefits of out of bed mobility.   Instructed on use of call button and importance of calling nursing staff for assistance with mobility   Questions/concerns addressed within PTA scope of practice  Pt verbalized understanding    Functional Mobility:  Transfers:     Sit to Stand:  contact guard assistance with rolling walker. Vc to scoot to edge of chair, hand placement and sequencing   Gait: pt amb ~180 ft SBA/CGA with RW, no LOB, narrow [place], Vc to stay within RW when turning   Stairs:  Pt ascended/descended 4 stair(s) and 4" curb step with Rolling Walker with bilateral handrails with Contact Guard Assistance. Vc for sequencing/ safety  Wheelchair Propulsion:  Pt propelled Light weight wheelchair x 70 feet on Level tile with  Bilateral upper extremity with Supervision or Set-up Assistance. Vc and tc for sharp turns    AM-PAC 6 CLICK MOBILITY  17    Patient left up in chair with call button in reach.    GOALS:   Multidisciplinary Problems       Physical Therapy Goals          Problem: Physical Therapy    Goal Priority Disciplines Outcome Goal Variances Interventions   Physical Therapy Goal     PT, PT/OT Ongoing, Progressing     Description: Goals to be met by: 24     Patient will increase functional independence with mobility by performin. Supine to sit with Supervision  2. Sit to supine with Supervision  3. Rolling to Left and Right with Supervision  4. Sit to stand transfer with Stand-by Assistance  5. Bed to chair transfer with Stand-by Assistance using Rolling Walker  6. Gait  x 150 feet with Stand-by Assistance using Rolling Walker  7. Wheelchair propulsion x 150 feet with Modified [place] using bilateral upper extremities  8. New Goal 24: Ascend/descend 4 inch curb step using RW with SBA  9. New Goal 24: Ascend/descend 4 steps using BHR with SBA (progress number of steps as tolerated)    Rehab Services' DME " recommendations    Wheelchair  Number of hours up in a wheelchair per day 8      Style Light weight    Justification for light weight w/c: patient cannot propel in a standard wheelchair, patient can and does self-propel in a lightweight wheelchair, patient has impaired ability to participate in MRADLs, mobility limitations cannot be sifficiently resolved with a cane/walker, the home provides adequate access between rooms for a wheelchair, a wheelchair will significantly improve the ability to participate in MRADLs and will be used in the home on a regular basis, the patient is willing to use a wheelchair in the home, the patient has a caregiver who is available, willing, and able to provide assistance with the wheelchair, and the patient requires additional person for safety with mobility with walker  Seat Width 18 (Standard adult)  Seat Depth 18  Back Height Standard  Leg Support Standard, Heel Loops, and Swing Away  Arm Height Desk and Swing Away  Lap Belt Buckle  Accessories Anti-tippers and Safety belt  Cushion Basic  Justification for Cushion Skin Integrity    Transport Chair    3 in 1 commode Standard     Justification for 3 in 1 commode: The patient's deficits will not be sufficiently addressed by a raised toilet seat                           Time Tracking:     PT Received On: 01/09/24  PT Start Time: 1510  PT Stop Time: 1543  PT Total Time (min): 33 min    Billable Minutes: Gait Training 20 and Therapeutic Exercise 13    Treatment Type: Treatment  PT/PTA: PTA     Number of PTA visits since last PT visit: 3     01/09/2024

## 2024-01-09 NOTE — PT/OT/SLP PROGRESS
Occupational Therapy   Treatment    Name: Dimitri Johnson  MRN: [id number]  Admit Date: 12/26/2023  Admitting Diagnosis:  Weakness    General Precautions: Standard, fall, aspiration, vision impaired   Orthopedic Precautions: N/A   Braces: N/A    Recommendations:     Discharge Recommendations:  home health OT  Level of Assistance Recommended at Discharge: 24 hours light assistance for ADL's and homemaking tasks  Discharge Equipment Recommendations: wheelchair, 3-in-1 commode  Barriers to discharge:   (increased A needed for mobility)    Assessment:     Dimitri Johnson is a 89 y.o. male with a medical diagnosis of Weakness.  He presents with limitations in performance of self-care, functional mobility, and ADLs. Performance deficits affecting function are weakness, impaired endurance, impaired self care skills, impaired functional mobility, gait instability, decreased lower extremity function, decreased safety awareness, impaired cognition, impaired coordination, decreased ROM, impaired skin. Pt tolerated Tx without incident and is making progress but continues to require assist to perform self care tasks, functional mobility and functional transfers. Pt would continue to benefit from OT intervention to further functional (I)ce and safety.     Rehab Potential is good    Activity tolerance:  Good    Plan:     Patient to be seen 5 x/week to address the above listed problems via self-care/home management, therapeutic activities, therapeutic exercises    Plan of Care Expires: 01/25/24  Plan of Care Reviewed with: patient    Subjective     Communicated with: Nursing prior to session.     Pain/Comfort:  Pain Rating 1: 0/10  Pain Rating Post-Intervention 1: 0/10    Patient's cultural, spiritual, Yazidism conflicts given the current situation:  no    Objective:     Patient found up in chair with  (no lines) upon OT entry to room.    Bed Mobility:    Pt seated in chair at onset of treatment session.     Functional  Mobility/Transfers:  Patient completed Sit <> Stand Transfer with moderate assistance  with  rolling walker     [facility] 6 Click ADL: 17    OT Exercises: Pt performed UBE exercise for 10 minutes with Min resistance.  UE exercises performed to increase functional endurance and strength in order increase independence when performing self care tasks, functional ambulation, W/C propulsion, and functional standing activities .    Treatment & Education:  Pt participated in standing activity with SBA/CGA and RW. Pt at raised counter to perform fine motor and reaching activity with focus on standing tolerance, functional reaching, dynamic standing bal, crossing midline, and to promote independence with homemaking and self care tasks. Pt tolerated standing for 7 min with completion of activity    Pt educated on:  - role of OT  - level of assistance  - energy conservation and task modification to maximized independence with ADL's and mobility   -  safety while performing functional transfers and self care tasks  - progress towards OT goals    Patient left up in chair with  PTA present    GOALS:   Multidisciplinary Problems       Occupational Therapy Goals          Problem: Occupational Therapy    Goal Priority Disciplines Outcome Interventions   Occupational Therapy Goal     OT, PT/OT Ongoing, Progressing    Description: Goals to be met by: 1/17/2024     Patient will increase functional independence with ADLs by performing:    UE Dressing with Modified [place]- Ongoing  LE Dressing with Stand-by Assistance-Ongoing  Grooming while seated at sink with Modified [place]- Ongoing  Toileting from toilet with Stand-by Assistance for hygiene and clothing management.   Bathing from sitting/standing at sink with Stand-by Assistance.  Supine to sit with Modified [place].  Step transfer with Stand-by Assistance with RW.  Toilet transfer to toilet with Stand-by Assistance with RW.  Footwear /c Mod A and AE as needed  Tolerate  standing functionals tasks for ~5 minutes /c CGA and RW as needed                         Time Tracking:     OT Date of Treatment: 01/09/24  OT Start Time: 1426    OT Stop Time: 1500  OT Total Time (min): 34 min    Billable Minutes:Therapeutic Activity 20  Therapeutic Exercise 14    1/9/2024

## 2024-01-09 NOTE — PROGRESS NOTES
"                                                        [facility]                                  Skilled Nursing Facility                   Progress Note     Patient Name: Dimitri Johnson  YOB: 1933  MRN: [id number]  Room: [id number]/[id number] A     Admit Date: 12/26/2023   PALOMO: 1/16/2024     Principal Problem:  Weakness    HPI obtained from patient interview and chart review     Chief Complaint: Re-evaluation of medical treatment and therapy status, lab review, dysuria, rectal pain    HPI:   Dimitri Johnson is a 89 y.o. male [facility] with weakness and constipation. He had associated stomach discomfort that was relieved after having a large bowel movement. Daily miralax and prn dulcolax regimen started. Patient has decreased mobility due to his degenerative disc disease and deconditioning. He is hesitant to walk and stand up. He worked with PT and OT who both recommended moderate intensity therapy.     Patient will be treated at [facility] with PT and OT to improve functional status and ability to perform ADLs.     Interval history:  24 hour chart review completed. Pertinent lab, micro, and/or radiology results addressed below  Screaming in pain, difficulty urinating, rectal pain. Residual 200ml, in and out cath performed dt patient screaming in discomfort. Based on previous CT a/p patient has prostatomegaly and "patulous rectum" distended v. prolapse? Physical exam showed no external trauma to rectum at rest. D/w attending  Urology appt requested again  Bladder scan q6h with in and out cath only for residual >300ml or discomfort  Increase senna to 2 tabs BID and miralax BID  Discontinue norvasc causes constipation will add prn and trend vitals  Vitals: Afebrile, /80 today isolated, had severe pain  Labs: KUB pending to monitor constipation  I/O: Patient reports fair appetite. Order is in place for assist with feedings due to blindness.  LBM 01/06, voiding " adequately using urinal  Skilled Therapy: Patient progressing with therapy as tolerated and would continue to benefit from skilled PT and OT services to improve overall functional mobility and independence, strength, endurance, and safety.      Past Medical History: Patient has a past medical history of Glaucoma and Hypertension. 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    Social History: Patient reports that he has never smoked. He does not have any smokeless tobacco history on file. He reports current alcohol use.    Family History:  non contributory    Allergies: Patient is allergic to pcn [penicillins].        ROS  Constitutional:  Negative for fever.  Positive forgetfulness  HENT:  Negative for sore throat.    Eyes:  Negative for redness. + limited vision  Respiratory:  Negative for shortness of breath.    Cardiovascular:  Negative for chest pain.   Gastrointestinal:  Negative for nausea. Positive rectal pain  Genitourinary:  Positive for dysuria,.   Musculoskeletal:  Negative for back pain.    Skin:  Negative for rash.   Neurological:  Positive for weakness.   Hematological:  Does not bruise/bleed easily.   Psychiatric/Behavioral: Negative insomnia      24 hour Vital Sign Range   Temp:  [98.7 °F (37.1 °C)-98.8 °F (37.1 °C)]   Pulse:  [79-87]   Resp:  [16-18]   BP: (136-180)/(68-80)   SpO2:  [97 %-98 %]       Physical Exam  General: Alert, frail, thin, oriented x4 forgetful at times  HENT: Atraumatic, normocephalic, Right eye keteroplasty/blind, left eye poor vision, hard of hearing  CV: RRR; no murmurs, Normal s1, s2, no peripheral edema  Resp: CTAB with normal work of breathing and chest excursion on room air.   Abd: Soft, NTND. Bowel sounds normoactive. No external hemorrhoids or prolapse visible  MSK: Generalized weakness  : Mixed  "incontinence  Neuro: Normal appearing coordination and sensory function.   Skin: No rashes or lesions noted, dry, warm, dressing to left ankle,  dermatitis sacrum  Psych: calm, cooperative, yells out at time         Labs:  Recent Labs   Lab 01/04/24  0553 01/08/24  0706   WBC 8.35 8.51   HGB 13.2* 12.7*   HCT 39.6* 40.3    270       Recent Labs   Lab 01/04/24  0553 01/08/24  0706    139   K 4.6 4.7    106   CO2 25 25   BUN 46* 37*   CREATININE 1.2 1.1   GLU 81 93   CALCIUM 8.9 9.1   MG 2.0 2.1   PHOS 3.8 4.2       No results for input(s): "ALKPHOS", "ALT", "AST", "ALBUMIN", "PROT", "BILITOT", "INR" in the last 168 hours.    No results for input(s): "POCTGLUCOSE" in the last 72 hours.    Meds Scheduled:   ascorbic acid (vitamin C)  500 mg Oral BID    aspirin  81 mg Oral Daily    calcium-vitamin D3  2 tablet Oral Daily    enoxparin  30 mg Subcutaneous Q24H (prophylaxis, 1700)    finasteride  5 mg Oral Daily    melatonin  6 mg Oral Nightly    mirtazapine  7.5 mg Oral QHS    polyethylene glycol  17 g Oral Daily    senna-docusate 8.6-50 mg  2 tablet Oral BID    tamsulosin  0.4 mg Oral Daily    zinc sulfate  220 mg Oral Daily       PRN:   acetaminophen, bisacodyL, calcium carbonate, HYDROcodone-acetaminophen, methocarbamoL      Assessment and Plan:    Severe malnutrition  Poor appetite  Body mass index is 19.34 kg/m²., previous weight unknown  RD following  Continue Remeron  Patient reports fair appetite.   Order in place for assist with feedings due to blindness.     Urinary retention  Dysuria  Left renal cyst and Prostatomegaly found on recent CT needs Urology outpatient   Urine culture with staph however previous culture negative, asymptomatic   vitamin C, finasteride  -12/29 Failed void trial, Ramirez replaced.   - Avoid constipation  - Urology clinic appointment requested of unit  via secure chat 1/3.  -  7 day doxy course for suspected UTI completed 1/6.    - 1/9 return of " dysuria/hestiancy. Bladder scan q6h in and out cath only for residual >300ml or patient discomfort. Urology appt requested again with scheduling    Constipation  CT- Limited examination. Moderately large liquid stool within the patulous rectum.  Question possible diarrheal illness and/or fecal impaction. Left renal cyst. Prostatomegaly. Osseous changes particularly of the spine.  - Miralax daily, senokot bid  - Encourge oral intake, assist with eating and drinking d/t visual impairment  - Avoid narcotics  -12/29 KUB no constipation  -1/1 Lactulose 30 g x 1   -1/6 + BM (first BM produced without aid of suppository since hospitalization)  1/9: no BM x3 days with rectal discomfort, urinary retention today. Increase senna to 2 tabs BID, miralax to BID. KUB ordered. Discontinue norvasc causes constipation    Degenerative disc disease  Osteoporosis  Muscle spasms   Continue Robaxin QID PRN, ibuprofen p.r.n. Q 8 H x3 days  -12/29:  Initiate Norco 5 mg q8h PRN    Hyponatremia  Hypocalcemia  Dehydration  Encourage oral intake  Monitor on routine lab  Calcium with vitamin-D    Right lower extremity Baker's cyst  Noted on venous ultrasound  Continue ibuprofen prn pain, ice  Ambulatory referral to orthopedics    Insomnia  Scheduled melatonin  Remeron 7.5 mg nightly     Weakness  Debility  Patient uses walker at baseline. His son reported  prior to admit he was unable to get off of toilet and ambulate.    B12 WNL TSH 8.7 but T4 WNL   PT/OT consult   Fall precaution  Lovenox 30mg daily for VTE ppx    Moisture associated dermatitis sacrum   Triad BID   Consult wound care   Vitamin-C and zinc     Primary hypertension  Home Norvasc 5 mg b.i.d.       1/9 Discontinue Norvasc causes constipation. PRN hydralazine for now, will add scheduled medication based on trends    Anticipate disposition:    Home with home health    IP [facility] RISK OF UNPLANNED READMISSION Model: LOW      Follow-up needed during SNF admission:   Urology clinic  appointment requested of unit  via secure chat 1/3 and 1/9    Follow-up needed after discharge from SNF:   - PCP within 1-2 weeks  - See appt scheduled below     No future appointments.      I certify that SNF services are required to be given on an inpatient basis because Dimitri Johnson needs for skilled nursing care and/or skilled rehabilitation are required on a daily basis and such services can only practically be provided in a skilled nursing facility setting and are for an ongoing condition for which she received inpatient care in the hospital.       Extended Visit:   Total time spent: 86 minutes  Description of Time: counseling provided on clinical condition, therapies provided, plan of care, emotional support, coordinating patient care with other care team members, reviewing and interpreting labs and imaging, collaboration with physician, initiating new orders, chart review, and documentation. See interval hx.       SADIE GÓMEZ  Department of Hospital Medicine   [facility]     DOS: 1/9/2024       Patient note was created using MModal Dictation.  Any errors in syntax or even information may not have been identified and edited on initial review prior to signing this note.

## 2024-01-10 PROCEDURE — 25000003 PHARM REV CODE 250: Performed by: HOSPITALIST

## 2024-01-10 PROCEDURE — 97110 THERAPEUTIC EXERCISES: CPT | Mod: CO

## 2024-01-10 PROCEDURE — 11000004 HC SNF PRIVATE

## 2024-01-10 PROCEDURE — 63600175 PHARM REV CODE 636 W HCPCS: Performed by: FAMILY MEDICINE

## 2024-01-10 PROCEDURE — 25000003 PHARM REV CODE 250: Performed by: FAMILY MEDICINE

## 2024-01-10 PROCEDURE — 97530 THERAPEUTIC ACTIVITIES: CPT | Mod: CO

## 2024-01-10 RX ORDER — METHOCARBAMOL 500 MG/1
500 TABLET, FILM COATED ORAL 3 TIMES DAILY
Status: DISCONTINUED | OUTPATIENT
Start: 2024-01-10 | End: 2024-01-23 | Stop reason: HOSPADM

## 2024-01-10 RX ORDER — ACETAMINOPHEN 500 MG
1000 TABLET ORAL EVERY 8 HOURS
Status: DISCONTINUED | OUTPATIENT
Start: 2024-01-10 | End: 2024-01-23 | Stop reason: HOSPADM

## 2024-01-10 RX ORDER — BISACODYL 10 MG/1
10 SUPPOSITORY RECTAL NIGHTLY
Status: DISPENSED | OUTPATIENT
Start: 2024-01-10 | End: 2024-01-13

## 2024-01-10 RX ADMIN — BISACODYL 10 MG: 10 SUPPOSITORY RECTAL at 09:01

## 2024-01-10 RX ADMIN — ZINC SULFATE 220 MG (50 MG) CAPSULE 220 MG: CAPSULE at 09:01

## 2024-01-10 RX ADMIN — POLYETHYLENE GLYCOL 3350 17 G: 17 POWDER, FOR SOLUTION ORAL at 09:01

## 2024-01-10 RX ADMIN — Medication 6 MG: at 09:01

## 2024-01-10 RX ADMIN — Medication 2 TABLET: at 09:01

## 2024-01-10 RX ADMIN — METHOCARBAMOL 500 MG: 500 TABLET ORAL at 09:01

## 2024-01-10 RX ADMIN — ASPIRIN 81 MG CHEWABLE TABLET 81 MG: 81 TABLET CHEWABLE at 09:01

## 2024-01-10 RX ADMIN — SENNOSIDES AND DOCUSATE SODIUM 2 TABLET: 8.6; 5 TABLET ORAL at 09:01

## 2024-01-10 RX ADMIN — OSELTAMIVIR PHOSPHATE 30 MG: 30 CAPSULE ORAL at 09:01

## 2024-01-10 RX ADMIN — TAMSULOSIN HYDROCHLORIDE 0.4 MG: 0.4 CAPSULE ORAL at 09:01

## 2024-01-10 RX ADMIN — HYDROCODONE BITARTRATE AND ACETAMINOPHEN 1 TABLET: 5; 325 TABLET ORAL at 09:01

## 2024-01-10 RX ADMIN — Medication 500 MG: at 09:01

## 2024-01-10 RX ADMIN — FINASTERIDE 5 MG: 5 TABLET, FILM COATED ORAL at 09:01

## 2024-01-10 RX ADMIN — MIRTAZAPINE 7.5 MG: 7.5 TABLET, FILM COATED ORAL at 09:01

## 2024-01-10 RX ADMIN — ACETAMINOPHEN 1000 MG: 500 TABLET ORAL at 09:01

## 2024-01-10 RX ADMIN — ENOXAPARIN SODIUM 30 MG: 30 INJECTION SUBCUTANEOUS at 05:01

## 2024-01-10 NOTE — PT/OT/SLP PROGRESS
Occupational Therapy   Treatment    Name: Dimitri Johnson  MRN: [id number]  Admit Date: 12/26/2023  Admitting Diagnosis:  Weakness    General Precautions: Standard, fall, aspiration, vision impaired   Orthopedic Precautions: N/A   Braces: N/A    Recommendations:     Discharge Recommendations:  home health OT  Level of Assistance Recommended at Discharge: 24 hours light assistance for ADL's and homemaking tasks  Discharge Equipment Recommendations: wheelchair, 3-in-1 commode  Barriers to discharge:   (increased A needed for mobility)    Assessment:     Dimitri Johnson is a 89 y.o. male with a medical diagnosis of Weakness.  He presents with limitations in performance of self-care, functional mobility, and ADLs. Performance deficits affecting function are weakness, impaired endurance, impaired self care skills, impaired functional mobility, gait instability, decreased lower extremity function, decreased safety awareness, impaired cognition, impaired coordination, decreased ROM, impaired skin. Pt tolerated Tx without incident and is making progress but continues to require assist to perform self care tasks, functional mobility and functional transfers. Pt would continue to benefit from OT intervention to further functional (I)ce and safety.     Rehab Potential is good    Activity tolerance:  Good    Plan:     Patient to be seen 5 x/week to address the above listed problems via self-care/home management, therapeutic activities, therapeutic exercises    Plan of Care Expires: 01/25/24  Plan of Care Reviewed with: patient    Subjective     Communicated with: Nursing prior to session.     Pain/Comfort:  Pain Rating 1: 0/10  Pain Rating Post-Intervention 1: 0/10    Patient's cultural, spiritual, Orthodox conflicts given the current situation:  no    Objective:     Patient found up in chair with  (no lines) upon OT entry to room.    Bed Mobility:    Pt seated in chair at onset of treatment session.     Functional  Mobility/Transfers:  Patient completed Sit <> Stand Transfer with minimum assistance  with  rolling walker  with step by step v/c for sequencing  Functional Mobility: Pt ambulated from therapy gym to room with CGA and RW for a total of 115 ft    [facility] 6 Click ADL: 17    OT Exercises: Pt performed UBE exercise for 12 minutes with Mod resistance.  UE exercises performed to increase functional endurance and strength in order increase independence when performing self care tasks, functional ambulation, W/C propulsion, and functional standing activities .      Treatment & Education:  Pt participated in standing activity with CGA and RW. Pt reaching for objects out of center of gravity, passing object behind back using BUE simultaneously, and putting objects away for activity with focus on standing tolerance, functional reaching, dynamic standing bal, crossing midline, and to promote independence with homemaking and self care tasks.     Pt educated on:  - role of OT  - level of assistance  - energy conservation and task modification to maximized independence with ADL's and mobility   -  safety while performing functional transfers and self care tasks  - progress towards OT goals    Patient left up in chair with call button in reach and visitor present    GOALS:   Multidisciplinary Problems       Occupational Therapy Goals          Problem: Occupational Therapy    Goal Priority Disciplines Outcome Interventions   Occupational Therapy Goal     OT, PT/OT Ongoing, Progressing    Description: Goals to be met by: 1/17/2024     Patient will increase functional independence with ADLs by performing:    UE Dressing with Modified [place]- Ongoing  LE Dressing with Stand-by Assistance-Ongoing  Grooming while seated at sink with Modified [place]- Ongoing  Toileting from toilet with Stand-by Assistance for hygiene and clothing management.   Bathing from sitting/standing at sink with Stand-by Assistance.  Supine to sit with  Modified [place].  Step transfer with Stand-by Assistance with RW.  Toilet transfer to toilet with Stand-by Assistance with RW.  Footwear /c Mod A and AE as needed  Tolerate standing functionals tasks for ~5 minutes /c CGA and RW as needed                         Time Tracking:     OT Date of Treatment: 01/10/24  OT Start Time: 1314    OT Stop Time: 1355  OT Total Time (min): 41 min    Billable Minutes:Therapeutic Activity 26  Therapeutic Exercise 15    1/10/2024

## 2024-01-10 NOTE — PROGRESS NOTES
[facility]                                  Skilled Nursing Facility                   Progress Note     Patient Name: Dimitri Johnson  YOB: 1933  MRN: [id number]  Room: [place] [id number]/[id number] A     Admit Date: 12/26/2023   PALOMO: 1/16/2024     Principal Problem:  Weakness    HPI obtained from patient interview and chart review     Chief Complaint: Re-evaluation of medical treatment and therapy status, pain management, urinary retention, imaging reviewed    HPI:   Dimitri Johnson is a 89 y.o. male [facility] with weakness and constipation. He had associated stomach discomfort that was relieved after having a large bowel movement. Daily miralax and prn dulcolax regimen started. Patient has decreased mobility due to his degenerative disc disease and deconditioning. He is hesitant to walk and stand up. He worked with PT and OT who both recommended moderate intensity therapy.     Patient will be treated at [facility] with PT and OT to improve functional status and ability to perform ADLs.     Interval history:  24 hour chart review completed. Pertinent lab, micro, and/or radiology results addressed below  Consistently receiving I and O cath due to discomfort with residual approximate 200- 400 mL  Change Tylenol to 1 G scheduled Q 8 hours, Robaxin 500 mg t.i.d.  Tamiflu renal dose prophylactically initiated out of caution due to facility having + flu patients.  Monitor for respiratory symptoms  Urology appt scheduled for 1/16  KUB reviewed no bowel dilation, +rectal constipation  Order for nightly suppository x3  Vitals: Afebrile, BP acceptable today 140/63 off medication  I/O: Patient eating 100% meals. Order is in place for assist with feedings due to blindness.  LBM 01/09, voiding using urinal and also I&O cath  Skilled Therapy: Patient progressing with therapy as tolerated and would continue to benefit from skilled PT and OT  services to improve overall functional mobility and independence, strength, endurance, and safety.      Past Medical History: Patient has a past medical history of Glaucoma and Hypertension. 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    Social History: Patient reports that he has never smoked. He does not have any smokeless tobacco history on file. He reports current alcohol use.    Family History:  non contributory    Allergies: Patient is allergic to pcn [penicillins].        ROS  Constitutional:  Negative for fever.  Positive forgetfulness  HENT:  Negative for sore throat.    Eyes:  Negative for redness. + limited vision  Respiratory:  Negative for shortness of breath.    Cardiovascular:  Negative for chest pain.   Gastrointestinal:  Negative for nausea. Positive rectal pain  Genitourinary:  Negative for dysuria,.   Musculoskeletal:  Negative for back pain.    Skin:  Negative for rash.   Neurological:  Positive for weakness.   Hematological:  Does not bruise/bleed easily.   Psychiatric/Behavioral: Negative insomnia      24 hour Vital Sign Range   Temp:  [97.5 °F (36.4 °C)-98.1 °F (36.7 °C)]   Pulse:  [69-84]   Resp:  [16-17]   BP: (111-140)/(56-63)   SpO2:  [98 %-100 %]       Physical Exam  General: Alert, frail, thin, oriented x4 forgetful at times  HENT: Atraumatic, normocephalic, Right eye keteroplasty/blind, left eye poor vision, hard of hearing  CV: RRR; no murmurs, Normal s1, s2, no peripheral edema  Resp: CTAB with normal work of breathing and chest excursion on room air.   Abd: Soft, NTND. Bowel sounds normoactive. No external hemorrhoids or prolapse visible  MSK: Generalized weakness  : Mixed incontinence  Neuro: Normal appearing coordination and sensory function.   Skin: No rashes or lesions noted, dry, warm, dressing to left  "ankle,  dermatitis sacrum  Psych: calm, cooperative, yells out at time         Labs:  Recent Labs   Lab 01/04/24  0553 01/08/24  0706   WBC 8.35 8.51   HGB 13.2* 12.7*   HCT 39.6* 40.3    270       Recent Labs   Lab 01/04/24  0553 01/08/24  0706    139   K 4.6 4.7    106   CO2 25 25   BUN 46* 37*   CREATININE 1.2 1.1   GLU 81 93   CALCIUM 8.9 9.1   MG 2.0 2.1   PHOS 3.8 4.2       No results for input(s): "ALKPHOS", "ALT", "AST", "ALBUMIN", "PROT", "BILITOT", "INR" in the last 168 hours.    No results for input(s): "POCTGLUCOSE" in the last 72 hours.    Meds Scheduled:   ascorbic acid (vitamin C)  500 mg Oral BID    aspirin  81 mg Oral Daily    bisacodyL  10 mg Rectal QHS    calcium-vitamin D3  2 tablet Oral Daily    enoxparin  30 mg Subcutaneous Q24H (prophylaxis, 1700)    finasteride  5 mg Oral Daily    melatonin  6 mg Oral Nightly    mirtazapine  7.5 mg Oral QHS    oseltamivir  30 mg Oral Daily    polyethylene glycol  17 g Oral BID    senna-docusate 8.6-50 mg  2 tablet Oral BID    tamsulosin  0.4 mg Oral Daily    zinc sulfate  220 mg Oral Daily       PRN:   acetaminophen, bisacodyL, calcium carbonate, hydrALAZINE, HYDROcodone-acetaminophen, methocarbamoL      Assessment and Plan:    Severe malnutrition  Poor appetite  Body mass index is 19.34 kg/m²., previous weight unknown  RD following  Continue Remeron  Patient reports fair appetite.   Order in place for assist with feedings due to blindness.   1/10 eating 100% meals    Urinary retention  Dysuria  Left renal cyst and Prostatomegaly found on recent CT needs Urology outpatient   Urine culture with staph however previous culture negative, asymptomatic   vitamin C, finasteride  -12/29 Failed void trial, Ramirez now removed with residuals 200-400 mL  - Avoid constipation  -  7 day doxy course for suspected UTI completed 1/6.    - 1/10 Bladder scan q6h in and out cath only for residual >300ml or patient discomfort. Urology appt 1/16.  Treat rectal " constipation seen on KUB    Constipation  CT- Limited examination. Moderately large liquid stool within the patulous rectum.  Question possible diarrheal illness and/or fecal impaction. Left renal cyst. Prostatomegaly. Osseous changes particularly of the spine.  - Miralax daily, senokot bid  - Encourge oral intake, assist with eating and drinking d/t visual impairment  - Avoid narcotics  -12/29 KUB no constipation  -1/1 Lactulose 30 g x 1   -1/6 + BM (first BM produced without aid of suppository since hospitalization)  1/9: no BM x3 days with rectal discomfort, urinary retention today. Increase senna to 2 tabs BID, miralax to BID. KUB ordered. Discontinue norvasc causes constipation  1/10 suppository daily x3    Degenerative disc disease  Osteoporosis  Muscle spasms   Continue Robaxin QID PRN, APAP  Norco 5 mg q8h PRN use with caution due to constipation    Hyponatremia  Hypocalcemia  Dehydration  Encourage oral intake  Monitor on routine lab  Calcium with vitamin-D    Right lower extremity Baker's cyst  Noted on venous ultrasound  Continue ibuprofen prn pain, ice  Ambulatory referral to orthopedics    Insomnia  Scheduled melatonin  Remeron 7.5 mg nightly     Weakness  Debility  Patient uses walker at baseline. His son reported  prior to admit he was unable to get off of toilet and ambulate.    B12 WNL TSH 8.7 but T4 WNL   PT/OT consult   Fall precaution  Lovenox 30mg daily for VTE ppx    Moisture associated dermatitis sacrum   Triad BID   Consult wound care   Vitamin-C and zinc     Primary hypertension  Home Norvasc 5 mg b.i.d.       1/9 Discontinue Norvasc causes constipation. PRN hydralazine for now, will add scheduled medication based on trends  1/10 BP acceptable 140/63    Anticipate disposition:    Home with home health    IP [facility] RISK OF UNPLANNED READMISSION Model: LOW      Follow-up needed during SNF admission:   Urology clinic 1/16    Follow-up needed after discharge from SNF:   - PCP within 1-2 weeks  -  See appt scheduled below     Future Appointments   Date Time Provider Department Center   1/16/2024 10:00 AM Vinh Moon MD [facility] UROLOGY [place]         I certify that SNF services are required to be given on an inpatient basis because Dimitri Johnson needs for skilled nursing care and/or skilled rehabilitation are required on a daily basis and such services can only practically be provided in a skilled nursing facility setting and are for an ongoing condition for which she received inpatient care in the hospital.       Extended Visit:   Total time spent: 46 minutes  Description of Time: counseling provided on clinical condition, therapies provided, plan of care, emotional support, coordinating patient care with other care team members, reviewing and interpreting labs and imaging, collaboration with physician, initiating new orders, chart review, and documentation. See interval hx.       SADIE GÓMEZ  Department of Hospital Medicine   [facility]     DOS: 1/10/2024       Patient note was created using MModal Dictation.  Any errors in syntax or even information may not have been identified and edited on initial review prior to signing this note.

## 2024-01-11 LAB
ANION GAP SERPL CALC-SCNC: 9 MMOL/L (ref 8–16)
BASOPHILS # BLD AUTO: 0.05 K/UL (ref 0–0.2)
BASOPHILS NFR BLD: 0.5 % (ref 0–1.9)
BUN SERPL-MCNC: 40 MG/DL (ref 8–23)
CALCIUM SERPL-MCNC: 9.3 MG/DL (ref 8.7–10.5)
CHLORIDE SERPL-SCNC: 107 MMOL/L (ref 95–110)
CO2 SERPL-SCNC: 22 MMOL/L (ref 23–29)
CREAT SERPL-MCNC: 1.2 MG/DL (ref 0.5–1.4)
DIFFERENTIAL METHOD BLD: ABNORMAL
EOSINOPHIL # BLD AUTO: 0.4 K/UL (ref 0–0.5)
EOSINOPHIL NFR BLD: 3.6 % (ref 0–8)
ERYTHROCYTE [DISTWIDTH] IN BLOOD BY AUTOMATED COUNT: 13.3 % (ref 11.5–14.5)
EST. GFR  (NO RACE VARIABLE): 57.4 ML/MIN/1.73 M^2
GLUCOSE SERPL-MCNC: 84 MG/DL (ref 70–110)
HCT VFR BLD AUTO: 40.4 % (ref 40–54)
HGB BLD-MCNC: 13.5 G/DL (ref 14–18)
IMM GRANULOCYTES # BLD AUTO: 0.05 K/UL (ref 0–0.04)
IMM GRANULOCYTES NFR BLD AUTO: 0.5 % (ref 0–0.5)
LYMPHOCYTES # BLD AUTO: 2.5 K/UL (ref 1–4.8)
LYMPHOCYTES NFR BLD: 24.5 % (ref 18–48)
MAGNESIUM SERPL-MCNC: 2.1 MG/DL (ref 1.6–2.6)
MCH RBC QN AUTO: 31.8 PG (ref 27–31)
MCHC RBC AUTO-ENTMCNC: 33.4 G/DL (ref 32–36)
MCV RBC AUTO: 95 FL (ref 82–98)
MONOCYTES # BLD AUTO: 0.9 K/UL (ref 0.3–1)
MONOCYTES NFR BLD: 8.4 % (ref 4–15)
NEUTROPHILS # BLD AUTO: 6.5 K/UL (ref 1.8–7.7)
NEUTROPHILS NFR BLD: 62.5 % (ref 38–73)
NRBC BLD-RTO: 0 /100 WBC
PHOSPHATE SERPL-MCNC: 3.1 MG/DL (ref 2.7–4.5)
PLATELET # BLD AUTO: 305 K/UL (ref 150–450)
PMV BLD AUTO: 11 FL (ref 9.2–12.9)
POTASSIUM SERPL-SCNC: 4 MMOL/L (ref 3.5–5.1)
RBC # BLD AUTO: 4.25 M/UL (ref 4.6–6.2)
SODIUM SERPL-SCNC: 138 MMOL/L (ref 136–145)
WBC # BLD AUTO: 10.35 K/UL (ref 3.9–12.7)

## 2024-01-11 PROCEDURE — 36415 COLL VENOUS BLD VENIPUNCTURE: CPT | Performed by: FAMILY MEDICINE

## 2024-01-11 PROCEDURE — 97110 THERAPEUTIC EXERCISES: CPT | Mod: CQ

## 2024-01-11 PROCEDURE — 80048 BASIC METABOLIC PNL TOTAL CA: CPT | Performed by: FAMILY MEDICINE

## 2024-01-11 PROCEDURE — 25000003 PHARM REV CODE 250: Performed by: HOSPITALIST

## 2024-01-11 PROCEDURE — 84100 ASSAY OF PHOSPHORUS: CPT | Performed by: FAMILY MEDICINE

## 2024-01-11 PROCEDURE — 25000003 PHARM REV CODE 250: Performed by: INTERNAL MEDICINE

## 2024-01-11 PROCEDURE — 83735 ASSAY OF MAGNESIUM: CPT | Performed by: FAMILY MEDICINE

## 2024-01-11 PROCEDURE — 97530 THERAPEUTIC ACTIVITIES: CPT | Mod: CQ

## 2024-01-11 PROCEDURE — 11000004 HC SNF PRIVATE

## 2024-01-11 PROCEDURE — 85025 COMPLETE CBC W/AUTO DIFF WBC: CPT | Performed by: FAMILY MEDICINE

## 2024-01-11 PROCEDURE — 97116 GAIT TRAINING THERAPY: CPT | Mod: CQ

## 2024-01-11 PROCEDURE — 97110 THERAPEUTIC EXERCISES: CPT | Mod: CO

## 2024-01-11 PROCEDURE — 94761 N-INVAS EAR/PLS OXIMETRY MLT: CPT

## 2024-01-11 PROCEDURE — 63600175 PHARM REV CODE 636 W HCPCS: Performed by: FAMILY MEDICINE

## 2024-01-11 PROCEDURE — 25000003 PHARM REV CODE 250: Performed by: FAMILY MEDICINE

## 2024-01-11 PROCEDURE — 97535 SELF CARE MNGMENT TRAINING: CPT | Mod: CO

## 2024-01-11 RX ORDER — TAMSULOSIN HYDROCHLORIDE 0.4 MG/1
0.8 CAPSULE ORAL DAILY
Status: DISCONTINUED | OUTPATIENT
Start: 2024-01-11 | End: 2024-01-23 | Stop reason: HOSPADM

## 2024-01-11 RX ADMIN — ZINC SULFATE 220 MG (50 MG) CAPSULE 220 MG: CAPSULE at 08:01

## 2024-01-11 RX ADMIN — Medication 500 MG: at 08:01

## 2024-01-11 RX ADMIN — SODIUM CHLORIDE, POTASSIUM CHLORIDE, SODIUM LACTATE AND CALCIUM CHLORIDE 1000 ML: 600; 310; 30; 20 INJECTION, SOLUTION INTRAVENOUS at 11:01

## 2024-01-11 RX ADMIN — Medication 6 MG: at 08:01

## 2024-01-11 RX ADMIN — POLYETHYLENE GLYCOL 3350 17 G: 17 POWDER, FOR SOLUTION ORAL at 08:01

## 2024-01-11 RX ADMIN — ACETAMINOPHEN 1000 MG: 500 TABLET ORAL at 10:01

## 2024-01-11 RX ADMIN — OSELTAMIVIR PHOSPHATE 30 MG: 30 CAPSULE ORAL at 09:01

## 2024-01-11 RX ADMIN — MIRTAZAPINE 7.5 MG: 7.5 TABLET, FILM COATED ORAL at 08:01

## 2024-01-11 RX ADMIN — CALCIUM CARBONATE (ANTACID) CHEW TAB 500 MG 500 MG: 500 CHEW TAB at 08:01

## 2024-01-11 RX ADMIN — SENNOSIDES AND DOCUSATE SODIUM 2 TABLET: 8.6; 5 TABLET ORAL at 08:01

## 2024-01-11 RX ADMIN — ASPIRIN 81 MG CHEWABLE TABLET 81 MG: 81 TABLET CHEWABLE at 09:01

## 2024-01-11 RX ADMIN — TAMSULOSIN HYDROCHLORIDE 0.8 MG: 0.4 CAPSULE ORAL at 05:01

## 2024-01-11 RX ADMIN — FINASTERIDE 5 MG: 5 TABLET, FILM COATED ORAL at 08:01

## 2024-01-11 RX ADMIN — ACETAMINOPHEN 1000 MG: 500 TABLET ORAL at 03:01

## 2024-01-11 RX ADMIN — ACETAMINOPHEN 1000 MG: 500 TABLET ORAL at 05:01

## 2024-01-11 RX ADMIN — ENOXAPARIN SODIUM 30 MG: 30 INJECTION SUBCUTANEOUS at 05:01

## 2024-01-11 RX ADMIN — METHOCARBAMOL 500 MG: 500 TABLET ORAL at 03:01

## 2024-01-11 RX ADMIN — Medication 2 TABLET: at 08:01

## 2024-01-11 RX ADMIN — METHOCARBAMOL 500 MG: 500 TABLET ORAL at 08:01

## 2024-01-11 RX ADMIN — BISACODYL 10 MG: 10 SUPPOSITORY RECTAL at 08:01

## 2024-01-11 NOTE — PT/OT/SLP PROGRESS
Occupational Therapy   Treatment    Name: Dimitri Johnson  MRN: [id number]  Admit Date: 12/26/2023  Admitting Diagnosis:  Weakness    General Precautions: Standard, fall, aspiration, vision impaired   Orthopedic Precautions: N/A   Braces: N/A    Recommendations:     Discharge Recommendations:  home health OT  Level of Assistance Recommended at Discharge: 24 hours light assistance for ADL's and homemaking tasks  Discharge Equipment Recommendations: wheelchair, 3-in-1 commode  Barriers to discharge:   (increased A needed for mobility)    Assessment:     Dimitri Johnson is a 89 y.o. male with a medical diagnosis of Weakness.  He presents with limitations in performance of self-care, functional mobility, and ADLs. Performance deficits affecting function are weakness, impaired endurance, impaired self care skills, impaired functional mobility, gait instability, decreased lower extremity function, decreased safety awareness, impaired cognition, impaired coordination, decreased ROM, impaired skin. Pt tolerated Tx without incident and is making progress but continues to require assist to perform self care tasks, functional mobility and functional transfers. Pt would continue to benefit from OT intervention to further functional (I)ce and safety.     Rehab Potential is good    Activity tolerance:  Good    Plan:     Patient to be seen 5 x/week to address the above listed problems via self-care/home management, therapeutic activities, therapeutic exercises    Plan of Care Expires: 01/25/24  Plan of Care Reviewed with: patient    Subjective     Communicated with: Nursing prior to session.     Pain/Comfort:  Pain Rating 1: 0/10  Pain Rating Post-Intervention 1: 0/10    Patient's cultural, spiritual, Church conflicts given the current situation:  no    Objective:     Patient found  up in bedside chair  with  (no lines) upon OT entry to room.    Bed Mobility:    Pt seated in chair at onset of treatment session.     Functional  Mobility/Transfers:  Patient completed Sit <> Stand Transfer with minimum assistance and moderate assistance  with  rolling walker and v/c for technique  Patient completed Toilet Transfer Step Transfer technique with contact guard assistance with  rolling walker and BSC positioned over toilet with v/c for technique  Functional Mobility: Pt ambulated functional distances in room with CGA and RW    Activities of Daily Living:  Oral Hygiene with stand by assistance while standing at sink level with RW  Personal Hygiene with stand by assistance while standing at sink level with RW  Toileting with minimum assistance from bedside commode over toilet with pt able to perform front and rear mauricio care in standing with RW with slight (A) to steady in standing with RW when managing pants up over hips     AMPAC 6 Click ADL: 17    OT Exercises: Pt performed UBE exercise for 12 minutes with Mod resistance. Pt performed BUE exercise with 2lb dowel with rep of 10 x2 of chest presses, wrist flex/ext, and bicep curls. UE exercises performed to increase functional endurance and strength in order increase independence when performing self care tasks, functional ambulation, W/C propulsion, and functional standing activities .      Treatment & Education:  Pt educated on:  - role of OT  - level of assistance  - energy conservation and task modification to maximized independence with ADL's and mobility   -  safety while performing functional transfers and self care tasks  - progress towards OT goals    Patient left up in chair with  PTA present    GOALS:   Multidisciplinary Problems       Occupational Therapy Goals          Problem: Occupational Therapy    Goal Priority Disciplines Outcome Interventions   Occupational Therapy Goal     OT, PT/OT Ongoing, Progressing    Description: Goals to be met by: 1/17/2024     Patient will increase functional independence with ADLs by performing:    UE Dressing with Modified [place]- Ongoing  CARLOS  Dressing with Stand-by Assistance-Ongoing  Grooming while seated at sink with Modified [place]- Ongoing  Toileting from toilet with Stand-by Assistance for hygiene and clothing management.   Bathing from sitting/standing at sink with Stand-by Assistance.  Supine to sit with Modified [place]mp.  Step transfer with Stand-by Assistance with RW.  Toilet transfer to toilet with Stand-by Assistance with RW.  Footwear /c Mod A and AE as needed  Tolerate standing functionals tasks for ~5 minutes /c CGA and RW as needed                         Time Tracking:     OT Date of Treatment: 01/11/24  OT Start Time: 0900    OT Stop Time: 0942  OT Total Time (min): 42 min    Billable Minutes:Self Care/Home Management 24  Therapeutic Exercise 18    1/11/2024

## 2024-01-11 NOTE — PLAN OF CARE
Continue regular diet, encourage PO intake, RD following  Goals: PO to meet 75% of EEN/EPN with ONS by next RD follow up  Nutrition Goal Status: progressing towards goal  Communication of RD Recs: other (comment) (POC)     Assessment and Plan   Severe malnutrition  Malnutrition Type:  Context:  social/environmental  Level:  severe     Related to (etiology):   Inadequate oral  intake,      Signs and Symptoms (as evidenced by):   BMI 18      Malnutrition Characteristic Summary:   5% weight loss in one month  Severe fat loss  Moderate to severe muscle loss        Interventions/Recommendations (treatment strategy):  Collaboration with other providers  General diet, ice cream with lunch and dinner  Vitamin supplement therapy- Vit D, Vit C  Mineral supplement therapy- zinc, Calcium  Prescription medication- mirtazapine     Nutrition Diagnosis Status: Active

## 2024-01-11 NOTE — NURSING
Pt complained of bladder fullness and inability to void. Advised pt that I would have to bladder scan him and also do a in I&o cath. Pt verbalized understanding.    Pre bladder scan showed 705ml   I&o cath returned 500ml  Post I &o cath returned 165ml   Pt tolerated well.

## 2024-01-11 NOTE — PLAN OF CARE
Problem: Adult Inpatient Plan of Care  Goal: Plan of Care Review  Outcome: Ongoing, Progressing  Goal: Patient-Specific Goal (Individualized)  Outcome: Ongoing, Progressing  Goal: Absence of Hospital-Acquired Illness or Injury  Outcome: Ongoing, Progressing  Goal: Optimal Comfort and Wellbeing  Outcome: Ongoing, Progressing  Goal: Readiness for Transition of Care  Outcome: Ongoing, Progressing     Problem: Fall Injury Risk  Goal: Absence of Fall and Fall-Related Injury  Outcome: Ongoing, Progressing     Problem: Skin Injury Risk Increased  Goal: Skin Health and Integrity  Outcome: Ongoing, Progressing     Problem: Impaired Wound Healing  Goal: Optimal Wound Healing  Outcome: Ongoing, Progressing  Intervention: Promote Wound Healing  Flowsheets (Taken 1/11/2024 1277)  Sleep/Rest Enhancement:   awakenings minimized   regular sleep/rest pattern promoted  Activity Management: Rolling - L1  Pain Management Interventions: pillow support provided     Problem: Infection  Goal: Absence of Infection Signs and Symptoms  Outcome: Ongoing, Progressing  Intervention: Prevent or Manage Infection  Flowsheets (Taken 1/11/2024 8232)  Isolation Precautions: precautions maintained

## 2024-01-11 NOTE — CARE UPDATE
SS received a returned phone call from 1.5 today regarding discharge planning for pt from [facility].    Explained to son that pt can be extended as a long as [employer] allows this extended stay.  They can notify us to establish and change the date of discharge.     IDT is scheduled this afternoon, call son first, then daughter (will do family training per son), they will speak and determine what the outcome should be. They are available but son is very limited by his health (pulmonary HTN himself), dtr is also available but limited also. Son is concerned with cognition and s/t memory loss.     Explained HHA and wc request from [employer] is expected upon discharge. Will discuss more today.    1.16.2024 remains date of discharge, may want an extension    508 p, 1/11/2024 Follow up call  IDT today, discussed facility placement with family while SS had to temporarily step out.  Extended another week for placement time and decisions   Let son know via message for to start looking, calling, leave me message with specific facilities and/or areas of the city you would like to pursue for placement        Extension date is 1/23/2024 pending [employer]-need to speak to him before sharing a new date

## 2024-01-11 NOTE — PROGRESS NOTES
[facility] - Skilled Nursing  Adult Nutrition  Progress Note    SUMMARY   Recommendations  Continue regular diet, encourage PO intake, RD following  Goals: PO to meet 75% of EEN/EPN with ONS by next RD follow up  Nutrition Goal Status: progressing towards goal  Communication of RD Recs: other (comment) (POC)    Assessment and Plan   Severe malnutrition  Malnutrition Type:  Context:  social/environmental  Level:  severe     Related to (etiology):   Inadequate oral  intake,      Signs and Symptoms (as evidenced by):   BMI 18      Malnutrition Characteristic Summary:   5% weight loss in one month  Severe fat loss  Moderate to severe muscle loss        Interventions/Recommendations (treatment strategy):  Collaboration with other providers  General diet, ice cream with lunch and dinner  Vitamin supplement therapy- Vit D, Vit C  Mineral supplement therapy- zinc, Calcium  Prescription medication- mirtazapine     Nutrition Diagnosis Status: Active    Malnutrition Assessment 12/29     Skin (Micronutrient): none, bruised, dry, pallor  Neck/Chest (Micronutrient): muscle wasting, bony prominence, subcutaneous fat loss  Musculoskeletal/Lower Extremities: subcutaneous fat loss, muscle control poor, muscle wasting           Orbital Region (Subcutaneous Fat Loss): severe depletion  Upper Arm Region (Subcutaneous Fat Loss): severe depletion  Thoracic and Lumbar Region: severe depletion   [place] Region (Muscle Loss): moderate depletion  Clavicle Bone Region (Muscle Loss): moderate depletion  Clavicle and Acromion Bone Region (Muscle Loss): severe depletion  Scapular Bone Region (Muscle Loss): severe depletion  Dorsal Hand (Muscle Loss): severe depletion  Patellar Region (Muscle Loss): moderate depletion  Anterior Thigh Region (Muscle Loss): moderate depletion  Posterior Calf Region (Muscle Loss): moderate depletion                 Reason for Assessment    Reason For Assessment: RD follow-up  Diagnosis:  (lorena  "constipation)  Relevant Medical History: HTN, deconditioning  Interdisciplinary Rounds: attended  General Information Comments: patient still refusing boost , states"he does not need it the food is enough", constipated, complaining of pain from IV site.  Nutrition Discharge Planning: DC on regular diet, ONS of choice for weight gain    Nutrition/Diet History    Patient Reported Diet/Restrictions/Preferences: general  Spiritual, Cultural Beliefs, Scientology Practices, Values that Affect Care: no  Food Allergies: NKFA  Factors Affecting Nutritional Intake: decreased appetite, constipation    Anthropometrics    Temp: 98.6 °F (37 °C)  Height Method: Stated  Height: 5' 11" (180.3 cm)  Height (inches): 71 in  Weight Method: Bed Scale  Weight: 62.9 kg (138 lb 10.7 oz)  Weight (lb): 138.67 lb  Ideal Body Weight (IBW), Male: 172 lb  % Ideal Body Weight, Male (lb): 75.37 %  BMI (Calculated): 19.3  BMI Grade: 18.5-24.9 - normal  Usual Body Weight (UBW), k.6 kg  % Usual Body Weight: 95.65  % Weight Change From Usual Weight: -4.55 %     Lab/Procedures/Meds  Pertinent Labs Reviewed: reviewed  Pertinent Labs Comments: Hct 135, BUN 40, GFR 57.4,  Pertinent Medications Reviewed: reviewed  Pertinent Medications Comments: senna-docusate, polyethlyene glycol    Estimated/Assessed Needs  Weight Used For Calorie Calculations: 61.6 kg (135 lb 12.9 oz) (UBW)  Energy Calorie Requirements (kcal): 7831-6354  Energy Need Method: Kcal/kg (25-30 kcal/kg for weight gain)  Protein Requirements: 80g  Weight Used For Protein Calculations: 61.6 kg (135 lb 12.9 oz) (UBW x 1.3)  Fluid Requirements (mL): 1540 or per MD  Estimated Fluid Requirement Method: RDA Method  RDA Method (mL): 1540  CHO Requirement: -    Nutrition Prescription Ordered  Current Diet Order: Regular  Nutrition Order Comments: PO %  Oral Nutrition Supplement: dc refuses    Evaluation of Received Nutrient/Fluid Intake  I/O: no data  Energy Calories Required: meeting " needs  Protein Required: not meeting needs  Fluid Required: meeting needs  Comments: LBM 1/6  Tolerance: tolerating  % Intake of Estimated Energy Needs: 75 - 100 %  % Meal Intake: 75 - 100 %    Nutrition Risk    Level of Risk/Frequency of Follow-up: low (one time per week)     Monitor and Evaluation    Food and Nutrient Intake: food and beverage intake  Food and Nutrient Adminstration: diet order  Physical Activity and Function: nutrition-related ADLs and IADLs  Anthropometric Measurements: weight change  Biochemical Data, Medical Tests and Procedures: electrolyte and renal panel, gastrointestinal profile  Nutrition-Focused Physical Findings: overall appearance     Nutrition Follow-Up    RD Follow-up?: Yes

## 2024-01-11 NOTE — NURSING
POC reviewed with pt, pt verbalized understanding. Safety maintained throughout shift, bed locked and in lowest position, call light in reach, Side rails up X 2. Non skid sock on when OOB. Pt remained free of fall/trauma. Pt self reports of pain treated with PO pain medication as ordered by MD. VS stable.  no reports of nausea and vomiting.  Pt stayed awoke most of the shift due to urinary urgency. Despite doing I&o cath pt still complained of urgency.  Charge notified of findings.  Flomax increased to 0.8mg daily by MD.  No signs of distress, rise and fall of chest noted, respirations  even and unlabored   Plan of care on going. No future concerns as of present.

## 2024-01-11 NOTE — NURSING
Bladder scanned pt returned 307ml in bladder. Informed pt that I would have to straight cath him due to volume in bladder being grater then 300ml.     Pt declined and stated he would like to wait until his next scan at 0400 to get straight cath. Educated pt on importance of receiving straight cath. Pt verbalized understanding . Plan of care ongoing.

## 2024-01-11 NOTE — CARE UPDATE
"Per nursing communication, "patient just had a straight cath performed "emptied >500ml" PVR is <150. Patient still having urgency symptoms. Is there anyway we can get something to help with symptoms. He does take Finasteride 0900 daily with some relief?"    -Increased Flomax to 0.8mg daily, first now    Review of patient's allergies indicates:   Allergen Reactions    Pcn [penicillins]              finasteride tablet 5 mg, 5 mg, Oral, Daily     tamsulosin 24 hr capsule 0.4 mg, 0.4 mg, Oral, Daily     "

## 2024-01-12 PROCEDURE — 25000003 PHARM REV CODE 250: Performed by: INTERNAL MEDICINE

## 2024-01-12 PROCEDURE — 11000004 HC SNF PRIVATE

## 2024-01-12 PROCEDURE — 97530 THERAPEUTIC ACTIVITIES: CPT

## 2024-01-12 PROCEDURE — 97535 SELF CARE MNGMENT TRAINING: CPT | Mod: CO

## 2024-01-12 PROCEDURE — 25000003 PHARM REV CODE 250: Performed by: FAMILY MEDICINE

## 2024-01-12 PROCEDURE — 97110 THERAPEUTIC EXERCISES: CPT | Mod: CO

## 2024-01-12 PROCEDURE — 97116 GAIT TRAINING THERAPY: CPT

## 2024-01-12 PROCEDURE — 63600175 PHARM REV CODE 636 W HCPCS: Performed by: FAMILY MEDICINE

## 2024-01-12 PROCEDURE — 25000003 PHARM REV CODE 250: Performed by: HOSPITALIST

## 2024-01-12 PROCEDURE — 97110 THERAPEUTIC EXERCISES: CPT

## 2024-01-12 PROCEDURE — 97530 THERAPEUTIC ACTIVITIES: CPT | Mod: CO

## 2024-01-12 RX ADMIN — METHOCARBAMOL 500 MG: 500 TABLET ORAL at 09:01

## 2024-01-12 RX ADMIN — Medication 500 MG: at 09:01

## 2024-01-12 RX ADMIN — OSELTAMIVIR PHOSPHATE 30 MG: 30 CAPSULE ORAL at 10:01

## 2024-01-12 RX ADMIN — HYDROCODONE BITARTRATE AND ACETAMINOPHEN 1 TABLET: 5; 325 TABLET ORAL at 01:01

## 2024-01-12 RX ADMIN — POLYETHYLENE GLYCOL 3350 17 G: 17 POWDER, FOR SOLUTION ORAL at 09:01

## 2024-01-12 RX ADMIN — METHOCARBAMOL 500 MG: 500 TABLET ORAL at 03:01

## 2024-01-12 RX ADMIN — SENNOSIDES AND DOCUSATE SODIUM 2 TABLET: 8.6; 5 TABLET ORAL at 09:01

## 2024-01-12 RX ADMIN — ACETAMINOPHEN 1000 MG: 500 TABLET ORAL at 09:01

## 2024-01-12 RX ADMIN — ZINC SULFATE 220 MG (50 MG) CAPSULE 220 MG: CAPSULE at 10:01

## 2024-01-12 RX ADMIN — Medication 500 MG: at 10:01

## 2024-01-12 RX ADMIN — Medication 6 MG: at 09:01

## 2024-01-12 RX ADMIN — ACETAMINOPHEN 1000 MG: 500 TABLET ORAL at 03:01

## 2024-01-12 RX ADMIN — TAMSULOSIN HYDROCHLORIDE 0.8 MG: 0.4 CAPSULE ORAL at 10:01

## 2024-01-12 RX ADMIN — ASPIRIN 81 MG CHEWABLE TABLET 81 MG: 81 TABLET CHEWABLE at 10:01

## 2024-01-12 RX ADMIN — MIRTAZAPINE 7.5 MG: 7.5 TABLET, FILM COATED ORAL at 09:01

## 2024-01-12 RX ADMIN — Medication 2 TABLET: at 10:01

## 2024-01-12 RX ADMIN — ACETAMINOPHEN 1000 MG: 500 TABLET ORAL at 05:01

## 2024-01-12 RX ADMIN — METHOCARBAMOL 500 MG: 500 TABLET ORAL at 10:01

## 2024-01-12 RX ADMIN — SENNOSIDES AND DOCUSATE SODIUM 2 TABLET: 8.6; 5 TABLET ORAL at 10:01

## 2024-01-12 RX ADMIN — ENOXAPARIN SODIUM 30 MG: 30 INJECTION SUBCUTANEOUS at 06:01

## 2024-01-12 RX ADMIN — FINASTERIDE 5 MG: 5 TABLET, FILM COATED ORAL at 10:01

## 2024-01-12 NOTE — PLAN OF CARE
Continue with progression of mobility toward goals as per POC.     Problem: Physical Therapy  Goal: Physical Therapy Goal  Description: Goals to be met by: 24     Patient will increase functional independence with mobility by performin. Supine to sit with Supervision - in progress  2. Sit to supine with Supervision - in progress  3. Rolling to Left and Right with Supervision - in progress  4. Sit to stand transfer with Stand-by Assistance - in progress  5. Bed to chair transfer with Stand-by Assistance using Rolling Walker - in progress  6. Gait  x 150 feet with Stand-by Assistance using Rolling Walker - in progress  7. Wheelchair propulsion x 150 feet with Modified [place] using bilateral upper extremities - in progress  8. New Goal 24: Ascend/descend 4 inch curb step using RW with SBA - in progress  9. New Goal 24: Ascend/descend 4 steps using BHR with SBA (progress number of steps as tolerated) - in progress    Rehab Services' DME recommendations    Wheelchair  Number of hours up in a wheelchair per day 8      Style Light weight    Justification for light weight w/c: patient cannot propel in a standard wheelchair, patient can and does self-propel in a lightweight wheelchair, patient has impaired ability to participate in MRADLs, mobility limitations cannot be sifficiently resolved with a cane/walker, the home provides adequate access between rooms for a wheelchair, a wheelchair will significantly improve the ability to participate in MRADLs and will be used in the home on a regular basis, the patient is willing to use a wheelchair in the home, the patient has a caregiver who is available, willing, and able to provide assistance with the wheelchair, and the patient requires additional person for safety with mobility with walker  Seat Width 18 (Standard adult)  Seat Depth 18  Back Height Standard  Leg Support Standard, Heel Loops, and Swing Away  Arm Height Desk and Swing Away  Lap Belt  Buckle  Accessories Anti-tippers and Safety belt  Cushion Basic  Justification for Cushion Skin Integrity    Transport Chair    3 in 1 commode Standard     Justification for 3 in 1 commode: The patient's deficits will not be sufficiently addressed by a raised toilet seat      Outcome: Ongoing, Progressing   1/12/2024

## 2024-01-12 NOTE — PROGRESS NOTES
[facility]                                  Skilled Nursing Facility                   Progress Note     Patient Name: Dimitri Johnson  YOB: 1933  MRN: [id number]  Room: [id number]/[id number] A     Admit Date: 12/26/2023   PALOMO: 1/23/2024     Principal Problem:  Weakness    HPI obtained from patient interview and chart review     Chief Complaint: Re-evaluation of medical treatment and therapy status, pain management, urinary retention, Ramirez replacement    HPI:   Dimitri Johnson is a 89 y.o. male [facility] with weakness and constipation. He had associated stomach discomfort that was relieved after having a large bowel movement. Daily miralax and prn dulcolax regimen started. Patient has decreased mobility due to his degenerative disc disease and deconditioning. He is hesitant to walk and stand up. He worked with PT and OT who both recommended moderate intensity therapy.     Patient will be treated at [facility] with PT and OT to improve functional status and ability to perform ADLs.     Interval history:  24 hour chart review completed. Pertinent lab, micro, and/or radiology results addressed below  Consistently receiving I and O cath due to discomfort patient requesting replacement of Ramirez catheter, we will order  Overnight patient's Flomax was increased to 0.8 mg  Urology appt scheduled for 1/16  Vitals: Afebrile, BP acceptable today 126/67 off medication  I/O: Patient eating 100% meals. Order is in place for assist with feedings due to blindness.  LBM 01/11.  Ramirez cath replaced  Skilled Therapy: Patient progressing with therapy as tolerated and would continue to benefit from skilled PT and OT services to improve overall functional mobility and independence, strength, endurance, and safety.  Per last PT note:   Pt ambulated ~275+100 ft with contact guard assistance and rolling walker     Past Medical History: Patient  has a past medical history of Glaucoma and Hypertension. 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    Social History: Patient reports that he has never smoked. He does not have any smokeless tobacco history on file. He reports current alcohol use.    Family History:  non contributory    Allergies: Patient is allergic to pcn [penicillins].        ROS  Constitutional:  Negative for fever.  Positive forgetfulness  HENT:  Negative for sore throat.    Eyes:  Negative for redness. + limited vision  Respiratory:  Negative for shortness of breath.    Cardiovascular:  Negative for chest pain.   Gastrointestinal:  Negative for nausea. Positive rectal pain  Genitourinary:  Positive urinary frequency and urgency  Musculoskeletal:  Negative for back pain.    Skin:  Negative for rash.   Neurological:  Positive for weakness.   Hematological:  Does not bruise/bleed easily.   Psychiatric/Behavioral: Negative insomnia      24 hour Vital Sign Range   Temp:  [97.9 °F (36.6 °C)-98.6 °F (37 °C)]   Pulse:  [74-82]   Resp:  [16-17]   BP: (108-126)/(55-67)   SpO2:  [97 %]       Physical Exam  General: Alert, frail, thin, oriented x3 poor short-term memory and recall  HENT: Atraumatic, normocephalic, Right eye keteroplasty/blind, left eye poor vision, hard of hearing  CV: RRR; no murmurs, Normal s1, s2, no peripheral edema  Resp: CTAB with normal work of breathing and chest excursion on room air.   Abd: Soft, NTND. Bowel sounds normoactive. No external hemorrhoids or prolapse visible  MSK: Generalized weakness  : Mixed incontinence  Neuro: Normal appearing coordination and sensory function.   Skin: No rashes or lesions noted, dry, warm, dressing to left ankle,  dermatitis sacrum  Psych: calm, cooperative, yells out at time         Labs:  Recent Labs   Lab  "01/08/24  0706 01/11/24  0441   WBC 8.51 10.35   HGB 12.7* 13.5*   HCT 40.3 40.4    305       Recent Labs   Lab 01/08/24  0706 01/11/24  0441    138   K 4.7 4.0    107   CO2 25 22*   BUN 37* 40*   CREATININE 1.1 1.2   GLU 93 84   CALCIUM 9.1 9.3   MG 2.1 2.1   PHOS 4.2 3.1       No results for input(s): "ALKPHOS", "ALT", "AST", "ALBUMIN", "PROT", "BILITOT", "INR" in the last 168 hours.    No results for input(s): "POCTGLUCOSE" in the last 72 hours.    Meds Scheduled:   acetaminophen  1,000 mg Oral Q8H    ascorbic acid (vitamin C)  500 mg Oral BID    aspirin  81 mg Oral Daily    bisacodyL  10 mg Rectal QHS    calcium-vitamin D3  2 tablet Oral Daily    enoxparin  30 mg Subcutaneous Q24H (prophylaxis, 1700)    finasteride  5 mg Oral Daily    lactated ringers  1,000 mL Intravenous Once    melatonin  6 mg Oral Nightly    methocarbamoL  500 mg Oral TID    mirtazapine  7.5 mg Oral QHS    oseltamivir  30 mg Oral Daily    polyethylene glycol  17 g Oral BID    senna-docusate 8.6-50 mg  2 tablet Oral BID    tamsulosin  0.8 mg Oral Daily    zinc sulfate  220 mg Oral Daily       PRN:   bisacodyL, calcium carbonate, hydrALAZINE, HYDROcodone-acetaminophen      Assessment and Plan:    Severe malnutrition  Poor appetite  Body mass index is 19.34 kg/m²., previous weight unknown  RD following  Continue Remeron  Patient reports fair appetite.   Order in place for assist with feedings due to blindness.     Urinary retention  Dysuria  Left renal cyst and Prostatomegaly found on recent CT needs Urology outpatient   Urine culture with staph however previous culture negative, asymptomatic   vitamin C, finasteride  -12/29 Failed void trial, Ramirez now removed with residuals 200-400 mL  - Avoid constipation  -  7 day doxy course for suspected UTI completed 1/6.    - 1/10 Bladder scan q6h in and out cath only for residual >300ml or patient discomfort. Urology appt 1/16.  Treat rectal constipation seen on KUB  1/11 Ramirez cath " replaced due to patient's severe discomfort    Constipation  CT- Limited examination. Moderately large liquid stool within the patulous rectum.  Question possible diarrheal illness and/or fecal impaction. Left renal cyst. Prostatomegaly. Osseous changes particularly of the spine.  - Miralax daily, senokot bid  - Encourge oral intake, assist with eating and drinking d/t visual impairment  - Avoid narcotics  -12/29 KUB no constipation  -1/1 Lactulose 30 g x 1   -1/6 + BM (first BM produced without aid of suppository since hospitalization)  1/9: no BM x3 days with rectal discomfort, urinary retention today. Increase senna to 2 tabs BID, miralax to BID. KUB ordered. Discontinue norvasc causes constipation  1/10 suppository daily x3  1/11 last BM today    Degenerative disc disease  Osteoporosis  Muscle spasms   Continue Robaxin QID PRN, APAP  Norco 5 mg q8h PRN use with caution due to constipation    Hyponatremia  Hypocalcemia  Dehydration  Encourage oral intake  Monitor on routine lab  Calcium with vitamin-D    Right lower extremity Baker's cyst  Noted on venous ultrasound  Continue ibuprofen prn pain, ice  Ambulatory referral to orthopedics    Insomnia  Scheduled melatonin  Remeron 7.5 mg nightly     Weakness  Debility  Patient uses walker at baseline. His son reported  prior to admit he was unable to get off of toilet and ambulate.    B12 WNL TSH 8.7 but T4 WNL   PT/OT consult   Fall precaution  Lovenox 30mg daily for VTE ppx    Moisture associated dermatitis sacrum   Triad BID   Consult wound care   Vitamin-C and zinc     Primary hypertension  Home Norvasc 5 mg b.i.d.       1/9 Discontinue Norvasc causes constipation. PRN hydralazine for now, will add scheduled medication based on trends  1/10 BP acceptable 126/67    Anticipate disposition:    Home with home health versus nursing facility    IP [facility] RISK OF UNPLANNED READMISSION Model: LOW      Follow-up needed during SNF admission:   Urology clinic 1/16    Follow-up  needed after discharge from SNF:   - PCP within 1-2 weeks  - See appt scheduled below     Future Appointments   Date Time Provider Department Center   1/16/2024 10:00 AM Vinh Moon MD [facility] UROLOG[facility] I certify that SNF services are required to be given on an inpatient basis because Dimitri Johnson needs for skilled nursing care and/or skilled rehabilitation are required on a daily basis and such services can only practically be provided in a skilled nursing facility setting and are for an ongoing condition for which she received inpatient care in the hospital.       Extended Visit:   Total time spent: 46 minutes  Description of Time: counseling provided on clinical condition, therapies provided, plan of care, emotional support, coordinating patient care with other care team members, reviewing and interpreting labs and imaging, collaboration with physician, initiating new orders, chart review, and documentation. See interval hx.       SADIE GÓMEZ  Department of Hospital Medicine   [facility]     DOS: 1/11/2024       Patient note was created using MModal Dictation.  Any errors in syntax or even information may not have been identified and edited on initial review prior to signing this note.

## 2024-01-12 NOTE — PLAN OF CARE
CHW delivered Choices Book to patient. CHW also, called  Son/Tanner to informed him that book was provided to dad and left at patient bedside.

## 2024-01-12 NOTE — PROGRESS NOTES
[facility]                                  Skilled Nursing Facility                   Progress Note     Patient Name: Dimitri Johnson  YOB: 1933  MRN: [id number]  Room: [id number] A     Admit Date: 12/26/2023   PALOMO: 1/23/2024     Principal Problem:  Weakness    HPI obtained from patient interview and chart review     Chief Complaint: Re-evaluation of medical treatment and therapy status, pain management, urinary retention, Ramirez replacement    HPI:   Dimitri Johnson is a 89 y.o. male admitted to [facility] 12/22 with weakness and constipation. He had associated stomach discomfort that was relieved after having a large bowel movement. Daily miralax and prn dulcolax regimen started. Patient has decreased mobility due to his degenerative disc disease and deconditioning. He is hesitant to walk and stand up. He worked with PT and OT who both recommended moderate intensity therapy.     Patient will be treated at [facility] with PT and OT to improve functional status and ability to perform ADLs.     Interval history:  24 hour chart review completed. Pertinent lab, micro, and/or radiology results addressed below  No complaint of pain since Ramirez catheter replaced  Urology appt scheduled for 1/16  Family interested in placement case management working on it  Vitals: Afebrile, BP acceptable today 126/67 off medication  I/O: Patient eating 100% meals. Order is in place for assist with feedings due to blindness.  LBM 01/12.  Ramirez cath  in place  Skilled Therapy: Patient progressing with therapy as tolerated and would continue to benefit from skilled PT and OT services to improve overall functional mobility and independence, strength, endurance, and safety.  Per last PT note:   Pt ambulated ~275+100 ft with contact guard assistance and rolling walker     Past Medical History: Patient has a past medical history of Glaucoma and Hypertension.  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    Social History: Patient reports that he has never smoked. He does not have any smokeless tobacco history on file. He reports current alcohol use.    Family History:  non contributory    Allergies: Patient is allergic to pcn [penicillins].        ROS  Constitutional:  Negative for fever.  Positive forgetfulness  HENT:  Negative for sore throat.    Eyes:  Negative for redness. + limited vision  Respiratory:  Negative for shortness of breath.    Cardiovascular:  Negative for chest pain.   Gastrointestinal:  Negative for nausea. Positive rectal pain  Genitourinary:  Positive urinary frequency and urgency  Musculoskeletal:  Negative for back pain.    Skin:  Negative for rash.   Neurological:  Positive for weakness.   Hematological:  Does not bruise/bleed easily.   Psychiatric/Behavioral: Negative insomnia      24 hour Vital Sign Range   Temp:  [98.8 °F (37.1 °C)-98.9 °F (37.2 °C)]   Pulse:  [74-97]   Resp:  [18-20]   BP: (127-170)/(62-79)   SpO2:  [96 %-97 %]       Physical Exam  General: Alert, frail, thin, oriented x3 poor short-term memory and recall  HENT: Atraumatic, normocephalic, Right eye keteroplasty/blind, left eye poor vision, hard of hearing  CV: RRR; no murmurs, Normal s1, s2, no peripheral edema  Resp: CTAB with normal work of breathing and chest excursion on room air.   Abd: Soft, NTND. Bowel sounds normoactive. No external hemorrhoids or prolapse visible  MSK: Generalized weakness  :  +Ramirez cath  Neuro: Normal appearing coordination and sensory function.   Skin: No rashes or lesions noted, dry, warm, dressing to left ankle,  dermatitis sacrum  Psych: calm, cooperative, yells out at time         Labs:  Recent Labs   Lab 01/08/24  0706 01/11/24  0441   WBC 8.51 10.35   HGB 12.7* 13.5*  "  HCT 40.3 40.4    305       Recent Labs   Lab 01/08/24  0706 01/11/24  0441    138   K 4.7 4.0    107   CO2 25 22*   BUN 37* 40*   CREATININE 1.1 1.2   GLU 93 84   CALCIUM 9.1 9.3   MG 2.1 2.1   PHOS 4.2 3.1       No results for input(s): "ALKPHOS", "ALT", "AST", "ALBUMIN", "PROT", "BILITOT", "INR" in the last 168 hours.    No results for input(s): "POCTGLUCOSE" in the last 72 hours.    Meds Scheduled:   acetaminophen  1,000 mg Oral Q8H    ascorbic acid (vitamin C)  500 mg Oral BID    aspirin  81 mg Oral Daily    bisacodyL  10 mg Rectal QHS    calcium-vitamin D3  2 tablet Oral Daily    enoxparin  30 mg Subcutaneous Q24H (prophylaxis, 1700)    finasteride  5 mg Oral Daily    melatonin  6 mg Oral Nightly    methocarbamoL  500 mg Oral TID    mirtazapine  7.5 mg Oral QHS    oseltamivir  30 mg Oral Daily    polyethylene glycol  17 g Oral BID    senna-docusate 8.6-50 mg  2 tablet Oral BID    tamsulosin  0.8 mg Oral Daily    zinc sulfate  220 mg Oral Daily       PRN:   bisacodyL, calcium carbonate, hydrALAZINE, HYDROcodone-acetaminophen      Assessment and Plan:    Severe malnutrition  Poor appetite  Body mass index is 19.34 kg/m²., previous weight unknown  RD following  Continue Remeron  Patient reports fair appetite.   Order in place for assist with feedings due to blindness.     Urinary retention  Dysuria  Left renal cyst and Prostatomegaly found on recent CT needs Urology outpatient   Urine culture with staph however previous culture negative, asymptomatic   vitamin C, finasteride  -12/29 Failed void trial, Ramirez now removed with residuals 200-400 mL  - Avoid constipation  -  7 day doxy course for suspected UTI completed 1/6.    - 1/10 Bladder scan q6h in and out cath only for residual >300ml or patient discomfort. Urology appt 1/16.  Treat rectal constipation seen on KUB  1/11 Ramirez cath replaced due to patient's severe discomfort  1/12  no complain of pain today    Constipation  CT- Limited " examination. Moderately large liquid stool within the patulous rectum.  Question possible diarrheal illness and/or fecal impaction. Left renal cyst. Prostatomegaly. Osseous changes particularly of the spine.  - Miralax daily, senokot bid  - Encourge oral intake, assist with eating and drinking d/t visual impairment  - Avoid narcotics  -12/29 KUB no constipation  -1/1 Lactulose 30 g x 1   -1/6 + BM (first BM produced without aid of suppository since hospitalization)  1/9: no BM x3 days with rectal discomfort, urinary retention today. Increase senna to 2 tabs BID, miralax to BID. KUB ordered. Discontinue norvasc causes constipation  1/10 suppository daily x3  1/12 last BM today    Degenerative disc disease  Osteoporosis  Muscle spasms   Continue Robaxin QID PRN, APAP  Norco 5 mg q8h PRN use with caution due to constipation    Hyponatremia  Hypocalcemia  Dehydration  Encourage oral intake  Monitor on routine lab  Calcium with vitamin-D    Right lower extremity Baker's cyst  Noted on venous ultrasound  Continue ibuprofen prn pain, ice  Ambulatory referral to orthopedics    Insomnia  Scheduled melatonin  Remeron 7.5 mg nightly     Weakness  Debility  Patient uses walker at baseline. His son reported  prior to admit he was unable to get off of toilet and ambulate.    B12 WNL TSH 8.7 but T4 WNL   PT/OT consult   Fall precaution  Lovenox 30mg daily for VTE ppx    Moisture associated dermatitis sacrum   Triad BID   Consult wound care   Vitamin-C and zinc     Primary hypertension  Home Norvasc 5 mg b.i.d.       1/9 Discontinue Norvasc causes constipation. PRN hydralazine for now, will add scheduled medication based on trends  1/12 BP acceptable 170/79, p.r.n. hydralazine available if persistent    Anticipate disposition:    Nursing facility TBD    IP [facility] RISK OF UNPLANNED READMISSION Model: LOW      Follow-up needed during SNF admission:   Urology clinic 1/16    Follow-up needed after discharge from SNF:   - PCP within 1-2  weeks  - See appt scheduled below     Future Appointments   Date Time Provider Department Center   1/16/2024 10:00 AM Vinh Moon MD [facility] UROLOGY [facility]         I certify that SNF services are required to be given on an inpatient basis because Dimitri Johnson needs for skilled nursing care and/or skilled rehabilitation are required on a daily basis and such services can only practically be provided in a skilled nursing facility setting and are for an ongoing condition for which she received inpatient care in the hospital.       Extended Visit:   Total time spent: 46 minutes  Description of Time: counseling provided on clinical condition, therapies provided, plan of care, emotional support, coordinating patient care with other care team members, reviewing and interpreting labs and imaging, collaboration with physician, initiating new orders, chart review, and documentation. See interval hx.       SADIE GÓMEZ  Department of Hospital Medicine   [facility]     DOS: 1/12/2024       Patient note was created using MModal Dictation.  Any errors in syntax or even information may not have been identified and edited on initial review prior to signing this note.

## 2024-01-12 NOTE — PLAN OF CARE
Problem: Adult Inpatient Plan of Care  Goal: Plan of Care Review  Outcome: Ongoing, Progressing  Goal: Patient-Specific Goal (Individualized)  Outcome: Ongoing, Progressing  Goal: Absence of Hospital-Acquired Illness or Injury  Outcome: Ongoing, Progressing  Goal: Optimal Comfort and Wellbeing  Outcome: Ongoing, Progressing  Goal: Readiness for Transition of Care  Outcome: Ongoing, Progressing     Problem: Fall Injury Risk  Goal: Absence of Fall and Fall-Related Injury  Outcome: Ongoing, Progressing  Intervention: Promote Injury-Free Environment  Flowsheets (Taken 1/12/2024 0407)  Safety Promotion/Fall Prevention:   assistive device/personal item within reach   side rails raised x 2     Problem: Skin Injury Risk Increased  Goal: Skin Health and Integrity  Outcome: Ongoing, Progressing     Problem: Impaired Wound Healing  Goal: Optimal Wound Healing  Outcome: Ongoing, Progressing  Intervention: Promote Wound Healing  Flowsheets (Taken 1/12/2024 0407)  Sleep/Rest Enhancement:   awakenings minimized   regular sleep/rest pattern promoted  Activity Management: Rolling - L1  Pain Management Interventions:   medication offered   relaxation techniques promoted   pillow support provided     Problem: Infection  Goal: Absence of Infection Signs and Symptoms  Outcome: Ongoing, Progressing     Problem: Malnutrition  Goal: Improved Nutritional Intake  Outcome: Ongoing, Progressing

## 2024-01-12 NOTE — PT/OT/SLP PROGRESS
"Occupational Therapy   Treatment    Name: Dimitri Johnson  MRN: [id number]  Admit Date: 12/26/2023  Admitting Diagnosis:  Weakness    General Precautions: Standard, fall, aspiration, vision impaired   Orthopedic Precautions: N/A   Braces: N/A    Recommendations:     Discharge Recommendations:  home health OT  Level of Assistance Recommended at Discharge: 24 hours light assistance for ADL's and homemaking tasks  Discharge Equipment Recommendations: wheelchair, 3-in-1 commode  Barriers to discharge:   (increased A needed for mobility)    Assessment:     Dimitri Johnson is a 89 y.o. male with a medical diagnosis of Weakness.  He presents with limitations in performance of self-care, functional mobility, and ADLs. Performance deficits affecting function are weakness, impaired endurance, impaired self care skills, impaired functional mobility, gait instability, decreased lower extremity function, decreased safety awareness, impaired cognition, impaired coordination, decreased ROM, impaired skin. Pt tolerated Tx without incident and is making progress but continues to require assist to perform self care tasks, functional mobility and functional transfers. Pt would continue to benefit from OT intervention to further functional (I)ce and safety.     Rehab Potential is good    Activity tolerance:  Good    Plan:     Patient to be seen 5 x/week to address the above listed problems via self-care/home management, therapeutic activities, therapeutic exercises    Plan of Care Expires: 01/25/24  Plan of Care Reviewed with: patient    Subjective     "I don't know why but I have in my head that I need to refuse therapy today but I just can't remember my reasoning. My nurse would inform you if I couldn't do therapy right." Pt refused therapy in early am due to reported wrist pain. Pt reports no pain at the time of Tx.    Communicated with: Nursing prior to session.     Pain/Comfort:  Pain Rating 1: 0/10  Pain Rating Post-Intervention 1: " 0/10    Patient's cultural, spiritual, Restorationism conflicts given the current situation:  no    Objective:     Patient found HOB elevated with gray catheter upon OT entry to room.    Bed Mobility:    Patient completed Rolling/Turning to Left with  stand by assistance and with side rail  Patient completed Rolling/Turning to Right with stand by assistance and with side rail  Patient completed Supine to Sit with stand by assistance and with side rail     Functional Mobility/Transfers:  Patient completed Sit <> Stand Transfer with minimum assistance and moderate assistance  with  rolling walker with v/c for technique  Patient completed Bed <> Chair Transfer using Stand Pivot technique with minimum assistance with rolling walker with v/c for technique  Functional Mobility: Pt propelled light weight W/C from room to therapy gym with SBA for a total of 115 ft using BUE to propel chair.     Activities of Daily Living:  Lower Body Dressing: minimum assistance to naseem pants seated EOB with (A) to thread RLE and manage gray with pt managing pants in standing with RW with v/c for sequencing    AMPAC 6 Click ADL: 17    OT Exercises: Pt performed UBE exercise for 12 minutes with Mod resistance.  UE exercises performed to increase functional endurance and strength in order increase independence when performing self care tasks, functional ambulation, W/C propulsion, and functional standing activities .      Treatment & Education:  Pt participated in gross motor balloon volleyball standing activity with CGA and RW with LUE positioned on RW. Pt at raised counter to perform fine motor and visual scanning activity with focus on standing tolerance, functional reaching, dynamic standing bal, crossing midline, and to promote independence with homemaking and self care tasks.     Pt educated on:  - role of OT  - level of assistance  - energy conservation and task modification to maximized independence with ADL's and mobility   -  safety  while performing functional transfers and self care tasks  - progress towards OT goals    Patient left up in chair with  rehab tech present    GOALS:   Multidisciplinary Problems       Occupational Therapy Goals          Problem: Occupational Therapy    Goal Priority Disciplines Outcome Interventions   Occupational Therapy Goal     OT, PT/OT Ongoing, Progressing    Description: Goals to be met by: 1/17/2024     Patient will increase functional independence with ADLs by performing:    UE Dressing with Modified [place]- Ongoing  LE Dressing with Stand-by Assistance-Ongoing  Grooming while seated at sink with Modified [place]- Ongoing  Toileting from toilet with Stand-by Assistance for hygiene and clothing management.   Bathing from sitting/standing at sink with Stand-by Assistance.  Supine to sit with Modified [place].  Step transfer with Stand-by Assistance with RW.  Toilet transfer to toilet with Stand-by Assistance with RW.  Footwear /c Mod A and AE as needed  Tolerate standing functionals tasks for ~5 minutes /c CGA and RW as needed                         Time Tracking:     OT Date of Treatment: 01/12/24  OT Start Time: 1115    OT Stop Time: 1155  OT Total Time (min): 40 min    Billable Minutes:Self Care/Home Management 10  Therapeutic Activity 15  Therapeutic Exercise 15    1/12/2024

## 2024-01-12 NOTE — CARE UPDATE
SS discussed with Tanner facility placement.    Tanner son is not able to get out of his home with his own physical disabilities. He stated his sister will be going out to facilities.     Lucille @[phone] will be the point person to visit facilities and take care of what is needed.  Contact called her, on cell. L VM. To contact her next week once facilities have been called.    Facility choices are [facility] in [facility], the [facility] which is [place]/.  Explained that the last mentioned facility may or may not be appropriate level of care; he would need to call and ask, and these types of facilities are private pay as well. Discussed [employer] and their payment capabilities moving forward with skilled nursing care and Medicaid information to son.    Will begin process of sending information to the above facilities including any other [facility] /facilities that may be willing to accept pt/to follow up.      433 pm printing out information to send next week to facilities  Obtained PASSAR  ALSO

## 2024-01-12 NOTE — PT/OT/SLP PROGRESS
"Physical Therapy Treatment    Patient Name:  Dimitri Johnson   MRN:  [id number]  Admit Date: 12/26/2023  Admitting Diagnosis: Weakness  Recent Surgeries: N/A    General Precautions: Standard, fall, aspiration, vision impaired  Orthopedic Precautions: N/A  Braces: N/A    Recommendations:     Discharge Recommendations: home health PT  Level of Assistance Recommended at Discharge: 24 hours light assistance  Discharge Equipment Recommendations: 3-in-1 commode, wheelchair  Barriers to discharge: Decreased caregiver support, Inaccessible home environment    Assessment:     Dimitri Johnson is a 89 y.o. male admitted with a medical diagnosis of Weakness. Patient agreeable to PT treatment this PM. Patient able to complete stair training this session with Tata and initial cues for sequencing of steps. Patient tolerated session without adverse reaction. Patient pleasance and motivated to engage in therapy. Patient will benefit from continued SNF rehabilitation services to address deficits as well as progress mobility towards PLOF and increased functional independence for safe transition to home environment at time of discharge.     Performance deficits affecting function: weakness, impaired endurance, impaired self care skills, impaired functional mobility, gait instability, impaired balance, decreased upper extremity function, decreased lower extremity function, decreased safety awareness, decreased ROM, impaired cardiopulmonary response to activity.    Rehab Potential is good    Activity Tolerance: Good    Plan:     Patient to be seen 5 x/week to address the above listed problems via gait training, therapeutic activities, therapeutic exercises, neuromuscular re-education, wheelchair management/training    Plan of Care Expires: 01/26/24  Plan of Care Reviewed with: patient    Subjective     "Whatever you want me to do."     Pain/Comfort:  Pain Rating 1: 0/10  Pain Rating Post-Intervention 1: 0/10    Patient's cultural, spiritual, " Restorationist conflicts given the current situation:  no    Objective:     Communicated with nursing staff prior to session.  Patient found up in chair with  (no active lines) upon PT entry to room.     Therapeutic Activities and Exercises:   Recumbent cross  - level 2 x 15 minutes for LE strengthening, ROM, and endurance; no rest break required.     Functional Mobility:  Transfers:     Sit to Stand:  minimum assistance and moderate assistance with rolling walker and minimal cues for hand placement and sequencing of task.  Wheelchair to/from NuStep: moderate assistance with  no AD  using  Stand Pivot; cues for hand placement  Gait: Patient ambulated 137 feet using RW with CGA. No LOB. W/C in tow. Cues for direction change secondary to visual impairment.    Stairs:  Pt ascended/descended 4 stair(s) with No Assistive Device with bilateral handrails with Minimal Assistance and initial cues for sequencing of steps. Patient ascended/descended 4 inch curb step using RW with ModA. Assist to manage RW.     AM-PAC 6 CLICK MOBILITY  17    Patient left up in chair with call button in reach and nursing staff notified.    GOALS:   Multidisciplinary Problems       Physical Therapy Goals          Problem: Physical Therapy    Goal Priority Disciplines Outcome Goal Variances Interventions   Physical Therapy Goal     PT, PT/OT Ongoing, Progressing     Description: Goals to be met by: 24     Patient will increase functional independence with mobility by performin. Supine to sit with Supervision - in progress  2. Sit to supine with Supervision - in progress  3. Rolling to Left and Right with Supervision - in progress  4. Sit to stand transfer with Stand-by Assistance - in progress  5. Bed to chair transfer with Stand-by Assistance using Rolling Walker - in progress  6. Gait  x 150 feet with Stand-by Assistance using Rolling Walker - in progress  7. Wheelchair propulsion x 150 feet with Modified [place] using  bilateral upper extremities - in progress  8. New Goal 1/8/24: Ascend/descend 4 inch curb step using RW with SBA - in progress  9. New Goal 1/8/24: Ascend/descend 4 steps using BHR with SBA (progress number of steps as tolerated) - in progress    Rehab Services' DME recommendations    Wheelchair  Number of hours up in a wheelchair per day 8      Style Light weight    Justification for light weight w/c: patient cannot propel in a standard wheelchair, patient can and does self-propel in a lightweight wheelchair, patient has impaired ability to participate in MRADLs, mobility limitations cannot be sifficiently resolved with a cane/walker, the home provides adequate access between rooms for a wheelchair, a wheelchair will significantly improve the ability to participate in MRADLs and will be used in the home on a regular basis, the patient is willing to use a wheelchair in the home, the patient has a caregiver who is available, willing, and able to provide assistance with the wheelchair, and the patient requires additional person for safety with mobility with walker  Seat Width 18 (Standard adult)  Seat Depth 18  Back Height Standard  Leg Support Standard, Heel Loops, and Swing Away  Arm Height Desk and Swing Away  Lap Belt Buckle  Accessories Anti-tippers and Safety belt  Cushion Basic  Justification for Cushion Skin Integrity    Transport Chair    3 in 1 commode Standard     Justification for 3 in 1 commode: The patient's deficits will not be sufficiently addressed by a raised toilet seat                           Time Tracking:     PT Received On: 01/12/24  PT Start Time: 1335  PT Stop Time: 1420  PT Total Time (min): 45 min    Billable Minutes: Gait Training 15, Therapeutic Activity 15, and Therapeutic Exercise 15    Treatment Type: Treatment  PT/PTA: PT     Number of PTA visits since last PT visit: 0     01/12/2024

## 2024-01-13 PROCEDURE — 25000003 PHARM REV CODE 250: Performed by: HOSPITALIST

## 2024-01-13 PROCEDURE — 63600175 PHARM REV CODE 636 W HCPCS: Performed by: FAMILY MEDICINE

## 2024-01-13 PROCEDURE — 25000003 PHARM REV CODE 250: Performed by: FAMILY MEDICINE

## 2024-01-13 PROCEDURE — 11000004 HC SNF PRIVATE

## 2024-01-13 PROCEDURE — 25000003 PHARM REV CODE 250: Performed by: INTERNAL MEDICINE

## 2024-01-13 RX ADMIN — ASPIRIN 81 MG CHEWABLE TABLET 81 MG: 81 TABLET CHEWABLE at 10:01

## 2024-01-13 RX ADMIN — ZINC SULFATE 220 MG (50 MG) CAPSULE 220 MG: CAPSULE at 10:01

## 2024-01-13 RX ADMIN — POLYETHYLENE GLYCOL 3350 17 G: 17 POWDER, FOR SOLUTION ORAL at 09:01

## 2024-01-13 RX ADMIN — Medication 6 MG: at 09:01

## 2024-01-13 RX ADMIN — Medication 500 MG: at 10:01

## 2024-01-13 RX ADMIN — METHOCARBAMOL 500 MG: 500 TABLET ORAL at 09:01

## 2024-01-13 RX ADMIN — SENNOSIDES AND DOCUSATE SODIUM 2 TABLET: 8.6; 5 TABLET ORAL at 09:01

## 2024-01-13 RX ADMIN — POLYETHYLENE GLYCOL 3350 17 G: 17 POWDER, FOR SOLUTION ORAL at 10:01

## 2024-01-13 RX ADMIN — ACETAMINOPHEN 1000 MG: 500 TABLET ORAL at 09:01

## 2024-01-13 RX ADMIN — TAMSULOSIN HYDROCHLORIDE 0.8 MG: 0.4 CAPSULE ORAL at 10:01

## 2024-01-13 RX ADMIN — METHOCARBAMOL 500 MG: 500 TABLET ORAL at 10:01

## 2024-01-13 RX ADMIN — SENNOSIDES AND DOCUSATE SODIUM 2 TABLET: 8.6; 5 TABLET ORAL at 10:01

## 2024-01-13 RX ADMIN — ENOXAPARIN SODIUM 30 MG: 30 INJECTION SUBCUTANEOUS at 05:01

## 2024-01-13 RX ADMIN — Medication 500 MG: at 09:01

## 2024-01-13 RX ADMIN — ACETAMINOPHEN 1000 MG: 500 TABLET ORAL at 02:01

## 2024-01-13 RX ADMIN — Medication 2 TABLET: at 10:01

## 2024-01-13 RX ADMIN — MIRTAZAPINE 7.5 MG: 7.5 TABLET, FILM COATED ORAL at 09:01

## 2024-01-13 RX ADMIN — FINASTERIDE 5 MG: 5 TABLET, FILM COATED ORAL at 10:01

## 2024-01-13 RX ADMIN — METHOCARBAMOL 500 MG: 500 TABLET ORAL at 02:01

## 2024-01-13 RX ADMIN — OSELTAMIVIR PHOSPHATE 30 MG: 30 CAPSULE ORAL at 10:01

## 2024-01-13 NOTE — PLAN OF CARE
Problem: Adult Inpatient Plan of Care  Goal: Plan of Care Review  Outcome: Ongoing, Progressing  Flowsheets (Taken 1/13/2024 5445)  Plan of Care Reviewed With: patient  Goal: Patient-Specific Goal (Individualized)  Outcome: Ongoing, Progressing  Goal: Absence of Hospital-Acquired Illness or Injury  Outcome: Ongoing, Progressing  Goal: Optimal Comfort and Wellbeing  Outcome: Ongoing, Progressing  Goal: Readiness for Transition of Care  Outcome: Ongoing, Progressing     Problem: Fall Injury Risk  Goal: Absence of Fall and Fall-Related Injury  Outcome: Ongoing, Progressing     Problem: Skin Injury Risk Increased  Goal: Skin Health and Integrity  Outcome: Ongoing, Progressing     Problem: Impaired Wound Healing  Goal: Optimal Wound Healing  Outcome: Ongoing, Progressing     Problem: Infection  Goal: Absence of Infection Signs and Symptoms  Outcome: Ongoing, Progressing     Problem: Malnutrition  Goal: Improved Nutritional Intake  Outcome: Ongoing, Progressing

## 2024-01-13 NOTE — PLAN OF CARE
Problem: Adult Inpatient Plan of Care  Goal: Plan of Care Review  Outcome: Ongoing, Progressing  Flowsheets (Taken 1/13/2024 1235)  Plan of Care Reviewed With: patient     Problem: Adult Inpatient Plan of Care  Goal: Patient-Specific Goal (Individualized)  Flowsheets (Taken 1/13/2024 1235)  Individualized Care Needs: Encourage out of bed to regain strength to be able to return home  Goal: Absence of Hospital-Acquired Illness or Injury  Intervention: Identify and Manage Fall Risk  Flowsheets (Taken 1/13/2024 1235)  Safety Promotion/Fall Prevention:   assistive device/personal item within reach   Fall Risk reviewed with patient/family   instructed to call staff for mobility   side rails raised x 2   lighting adjusted  Intervention: Prevent Skin Injury  Flowsheets (Taken 1/13/2024 1235)  Body Position: weight shifting  Skin Protection:   skin sealant/moisture barrier applied   protective footwear used  Intervention: Prevent and Manage VTE (Venous Thromboembolism) Risk  Flowsheets (Taken 1/13/2024 1235)  Activity Management: Rolling - L1  VTE Prevention/Management: bleeding risk assessed

## 2024-01-14 PROCEDURE — 25000003 PHARM REV CODE 250: Performed by: INTERNAL MEDICINE

## 2024-01-14 PROCEDURE — 97535 SELF CARE MNGMENT TRAINING: CPT

## 2024-01-14 PROCEDURE — 11000004 HC SNF PRIVATE

## 2024-01-14 PROCEDURE — 25000003 PHARM REV CODE 250: Performed by: HOSPITALIST

## 2024-01-14 PROCEDURE — 25000003 PHARM REV CODE 250: Performed by: FAMILY MEDICINE

## 2024-01-14 PROCEDURE — 63600175 PHARM REV CODE 636 W HCPCS: Performed by: FAMILY MEDICINE

## 2024-01-14 PROCEDURE — 97110 THERAPEUTIC EXERCISES: CPT

## 2024-01-14 RX ADMIN — ASPIRIN 81 MG CHEWABLE TABLET 81 MG: 81 TABLET CHEWABLE at 12:01

## 2024-01-14 RX ADMIN — POLYETHYLENE GLYCOL 3350 17 G: 17 POWDER, FOR SOLUTION ORAL at 10:01

## 2024-01-14 RX ADMIN — Medication 500 MG: at 12:01

## 2024-01-14 RX ADMIN — TAMSULOSIN HYDROCHLORIDE 0.8 MG: 0.4 CAPSULE ORAL at 12:01

## 2024-01-14 RX ADMIN — MIRTAZAPINE 7.5 MG: 7.5 TABLET, FILM COATED ORAL at 09:01

## 2024-01-14 RX ADMIN — ZINC SULFATE 220 MG (50 MG) CAPSULE 220 MG: CAPSULE at 12:01

## 2024-01-14 RX ADMIN — SENNOSIDES AND DOCUSATE SODIUM 2 TABLET: 8.6; 5 TABLET ORAL at 09:01

## 2024-01-14 RX ADMIN — Medication 2 TABLET: at 12:01

## 2024-01-14 RX ADMIN — ACETAMINOPHEN 1000 MG: 500 TABLET ORAL at 03:01

## 2024-01-14 RX ADMIN — Medication 500 MG: at 10:01

## 2024-01-14 RX ADMIN — OSELTAMIVIR PHOSPHATE 30 MG: 30 CAPSULE ORAL at 12:01

## 2024-01-14 RX ADMIN — Medication 6 MG: at 10:01

## 2024-01-14 RX ADMIN — METHOCARBAMOL 500 MG: 500 TABLET ORAL at 03:01

## 2024-01-14 RX ADMIN — FINASTERIDE 5 MG: 5 TABLET, FILM COATED ORAL at 12:01

## 2024-01-14 RX ADMIN — ACETAMINOPHEN 1000 MG: 500 TABLET ORAL at 09:01

## 2024-01-14 RX ADMIN — ENOXAPARIN SODIUM 30 MG: 30 INJECTION SUBCUTANEOUS at 06:01

## 2024-01-14 RX ADMIN — METHOCARBAMOL 500 MG: 500 TABLET ORAL at 10:01

## 2024-01-14 RX ADMIN — METHOCARBAMOL 500 MG: 500 TABLET ORAL at 12:01

## 2024-01-14 NOTE — PT/OT/SLP PROGRESS
"Occupational Therapy   Treatment    Name: Dimitri Johnson  MRN: [id number]  Admit Date: 12/26/2023  Admitting Diagnosis:  Weakness    General Precautions: Standard, fall, aspiration, vision impaired   Orthopedic Precautions: N/A   Braces: N/A    Recommendations:     Discharge Recommendations:  home health OT  Level of Assistance Recommended at Discharge: 24 hours light assistance for ADL's and homemaking tasks  Discharge Equipment Recommendations: wheelchair, 3-in-1 commode  Barriers to discharge:   (increased A needed for mobility)    Assessment:     Dimitri Johnson is a 89 y.o. male with a medical diagnosis of Weakness. Pt tolerated session well and without incident and shows excellent motivation and potential to improve, but the pt continues to require assistance to perform self-care tasks and mobility. Pt strengths include Attention to task and Motivation and Willingness to participate. Pt improved LBD and Static standing balance. However, pt would continue to benefit from cont'd OT services in the SNF setting to improve safety and independence /c functional tasks and ADLs upon discharge. Performance deficits affecting function are weakness, impaired endurance, impaired self care skills, impaired functional mobility, gait instability, decreased lower extremity function, decreased safety awareness, impaired cognition, impaired coordination, decreased ROM, impaired skin.     Rehab Potential is good    Activity tolerance:  Good    Plan:     Patient to be seen 5 x/week to address the above listed problems via self-care/home management, therapeutic activities, therapeutic exercises    Plan of Care Expires: 01/25/24  Plan of Care Reviewed with: patient    Subjective     Communicated with: MERLINE prior to session.     " I don't feel pain, it just feels like my legs are heavy." .    Pain/Comfort:  Pain Rating 1: 0/10  Pain Rating Post-Intervention 1: 0/10    Patient's cultural, spiritual, Pentecostal conflicts given the current " situation:  no    Objective:     Patient found supine with gray catheter upon OT entry to room. Pt alert and agreeable to therapy.       Bed Mobility:    Patient completed Supine to Sit with stand by assistance and with side rail     Functional Mobility/Transfers:  Patient completed Sit <> Stand Transfer with moderate assistance  with  rolling walker and from EOB/WC /c frequent vcs for proper use of RW/placement of hands   Patient completed Bed <> Chair Transfer using Stand Pivot technique with minimum assistance with rolling walker      Activities of Daily Living:  Upper Body Dressing: stand by assistance to doff/don shirt seated at EOB  Lower Body Dressing: minimum assistance to don pants /c BLE threaded seated at EOB and managed over hips in standing using RW. Pt req (A) for maintaining balance in standing when managing hips.    [facility] 6 Click ADL: 17    OT Exercises:     ~Pt preformed 1x5 sit<>stand exercises /c unsupported static stands and seated rest breaks between each rep. Pt req Mod A for t/f and Tata/CGA for maintaining static stands.     ~Pt participated in 1x10 modified abdominal crunches, scapular retractions, modified abdominal oblique crunches (R/L), and modified abdominal twists (R/L) while seated in WC.    ~Pt participated in 12 minute UBE activity seated in WC at moderate resistance to address endurance and strength of UE to improve performance of ADLs and other functional tasks.     Treatment & Education:  Pt educated on POC, role of OT, safety, and proper body mechanics for performing functional transfers. Pt performed tasks to improve safety and independence in functional tasks and ADLs as mentioned above.       Patient left up in chair with all lines intact, call button in reach, and all needs met    GOALS:   Multidisciplinary Problems       Occupational Therapy Goals          Problem: Occupational Therapy    Goal Priority Disciplines Outcome Interventions   Occupational Therapy Goal     OT,  PT/OT Ongoing, Progressing    Description: Goals to be met by: 1/17/2024     Patient will increase functional independence with ADLs by performing:    UE Dressing with Modified [place]- Ongoing  LE Dressing with Stand-by Assistance-Ongoing  Grooming while seated at sink with Modified [place]- Ongoing  Toileting from toilet with Stand-by Assistance for hygiene and clothing management.   Bathing from sitting/standing at sink with Stand-by Assistance.  Supine to sit with Modified [place].  Step transfer with Stand-by Assistance with RW.  Toilet transfer to toilet with Stand-by Assistance with RW.  Footwear /c Mod A and AE as needed  Tolerate standing functionals tasks for ~5 minutes /c CGA and RW as needed                         Time Tracking:     OT Date of Treatment: 01/14/24  OT Start Time: 1036    OT Stop Time: 1120  OT Total Time (min): 44 min    Billable Minutes:Self Care/Home Management 15  Therapeutic Exercise 29    1/14/2024

## 2024-01-14 NOTE — PLAN OF CARE
Problem: Adult Inpatient Plan of Care  Goal: Plan of Care Review  Outcome: Ongoing, Progressing  Flowsheets (Taken 1/14/2024 0056)  Plan of Care Reviewed With: patient  Goal: Patient-Specific Goal (Individualized)  Outcome: Ongoing, Progressing  Goal: Absence of Hospital-Acquired Illness or Injury  Outcome: Ongoing, Progressing  Goal: Optimal Comfort and Wellbeing  Outcome: Ongoing, Progressing  Goal: Readiness for Transition of Care  Outcome: Ongoing, Progressing     Problem: Fall Injury Risk  Goal: Absence of Fall and Fall-Related Injury  Outcome: Ongoing, Progressing     Problem: Skin Injury Risk Increased  Goal: Skin Health and Integrity  Outcome: Ongoing, Progressing  Intervention: Optimize Skin Protection  Flowsheets (Taken 1/14/2024 0056)  Pressure Reduction Techniques: frequent weight shift encouraged  Pressure Reduction Devices: pressure-redistributing mattress utilized  Skin Protection: incontinence pads utilized  Head of Bed (HOB) Positioning: HOB at 30-45 degrees     Problem: Impaired Wound Healing  Goal: Optimal Wound Healing  Outcome: Ongoing, Progressing     Problem: Infection  Goal: Absence of Infection Signs and Symptoms  Outcome: Ongoing, Progressing     Problem: Malnutrition  Goal: Improved Nutritional Intake  Outcome: Ongoing, Progressing

## 2024-01-14 NOTE — PLAN OF CARE
Problem: Adult Inpatient Plan of Care  Goal: Plan of Care Review  Outcome: Ongoing, Progressing  Flowsheets (Taken 1/14/2024 1615)  Plan of Care Reviewed With: patient     Problem: Adult Inpatient Plan of Care  Goal: Patient-Specific Goal (Individualized)  Flowsheets (Taken 1/14/2024 1615)  Individualized Care Needs: Encourage out of bed to regain strength  Goal: Absence of Hospital-Acquired Illness or Injury  Intervention: Identify and Manage Fall Risk  Flowsheets (Taken 1/14/2024 1615)  Safety Promotion/Fall Prevention:   assistive device/personal item within reach   lighting adjusted   instructed to call staff for mobility   Fall Risk reviewed with patient/family   side rails raised x 2  Intervention: Prevent Skin Injury  Flowsheets (Taken 1/14/2024 1615)  Skin Protection:   incontinence pads utilized   protective footwear used  Intervention: Prevent and Manage VTE (Venous Thromboembolism) Risk  Flowsheets (Taken 1/14/2024 1615)  Activity Management: Rolling - L1  VTE Prevention/Management:   bleeding risk assessed   fluids promoted

## 2024-01-15 LAB
ANION GAP SERPL CALC-SCNC: 9 MMOL/L (ref 8–16)
BASOPHILS # BLD AUTO: 0.05 K/UL (ref 0–0.2)
BASOPHILS NFR BLD: 0.7 % (ref 0–1.9)
BUN SERPL-MCNC: 34 MG/DL (ref 8–23)
CALCIUM SERPL-MCNC: 9.2 MG/DL (ref 8.7–10.5)
CHLORIDE SERPL-SCNC: 107 MMOL/L (ref 95–110)
CO2 SERPL-SCNC: 21 MMOL/L (ref 23–29)
CREAT SERPL-MCNC: 1.2 MG/DL (ref 0.5–1.4)
DIFFERENTIAL METHOD BLD: ABNORMAL
EOSINOPHIL # BLD AUTO: 0.3 K/UL (ref 0–0.5)
EOSINOPHIL NFR BLD: 4.3 % (ref 0–8)
ERYTHROCYTE [DISTWIDTH] IN BLOOD BY AUTOMATED COUNT: 13.2 % (ref 11.5–14.5)
EST. GFR  (NO RACE VARIABLE): 57.4 ML/MIN/1.73 M^2
GLUCOSE SERPL-MCNC: 82 MG/DL (ref 70–110)
HCT VFR BLD AUTO: 38.9 % (ref 40–54)
HGB BLD-MCNC: 12.6 G/DL (ref 14–18)
IMM GRANULOCYTES # BLD AUTO: 0.03 K/UL (ref 0–0.04)
IMM GRANULOCYTES NFR BLD AUTO: 0.4 % (ref 0–0.5)
LYMPHOCYTES # BLD AUTO: 2.4 K/UL (ref 1–4.8)
LYMPHOCYTES NFR BLD: 30.9 % (ref 18–48)
MAGNESIUM SERPL-MCNC: 2.1 MG/DL (ref 1.6–2.6)
MCH RBC QN AUTO: 31 PG (ref 27–31)
MCHC RBC AUTO-ENTMCNC: 32.4 G/DL (ref 32–36)
MCV RBC AUTO: 96 FL (ref 82–98)
MONOCYTES # BLD AUTO: 0.6 K/UL (ref 0.3–1)
MONOCYTES NFR BLD: 7.6 % (ref 4–15)
NEUTROPHILS # BLD AUTO: 4.3 K/UL (ref 1.8–7.7)
NEUTROPHILS NFR BLD: 56.1 % (ref 38–73)
NRBC BLD-RTO: 0 /100 WBC
PHOSPHATE SERPL-MCNC: 4 MG/DL (ref 2.7–4.5)
PLATELET # BLD AUTO: 282 K/UL (ref 150–450)
PMV BLD AUTO: 11 FL (ref 9.2–12.9)
POTASSIUM SERPL-SCNC: 4.9 MMOL/L (ref 3.5–5.1)
RBC # BLD AUTO: 4.07 M/UL (ref 4.6–6.2)
SODIUM SERPL-SCNC: 137 MMOL/L (ref 136–145)
WBC # BLD AUTO: 7.68 K/UL (ref 3.9–12.7)

## 2024-01-15 PROCEDURE — 11000004 HC SNF PRIVATE

## 2024-01-15 PROCEDURE — 25000003 PHARM REV CODE 250: Performed by: HOSPITALIST

## 2024-01-15 PROCEDURE — 80048 BASIC METABOLIC PNL TOTAL CA: CPT | Performed by: HOSPITALIST

## 2024-01-15 PROCEDURE — 63600175 PHARM REV CODE 636 W HCPCS: Performed by: FAMILY MEDICINE

## 2024-01-15 PROCEDURE — 25000003 PHARM REV CODE 250: Performed by: FAMILY MEDICINE

## 2024-01-15 PROCEDURE — 94761 N-INVAS EAR/PLS OXIMETRY MLT: CPT

## 2024-01-15 PROCEDURE — 83735 ASSAY OF MAGNESIUM: CPT | Performed by: HOSPITALIST

## 2024-01-15 PROCEDURE — 85025 COMPLETE CBC W/AUTO DIFF WBC: CPT | Performed by: HOSPITALIST

## 2024-01-15 PROCEDURE — 84100 ASSAY OF PHOSPHORUS: CPT | Performed by: HOSPITALIST

## 2024-01-15 PROCEDURE — 25000003 PHARM REV CODE 250: Performed by: INTERNAL MEDICINE

## 2024-01-15 RX ORDER — SODIUM BICARBONATE 650 MG/1
650 TABLET ORAL DAILY
Status: COMPLETED | OUTPATIENT
Start: 2024-01-15 | End: 2024-01-18

## 2024-01-15 RX ADMIN — METHOCARBAMOL 500 MG: 500 TABLET ORAL at 09:01

## 2024-01-15 RX ADMIN — ACETAMINOPHEN 1000 MG: 500 TABLET ORAL at 02:01

## 2024-01-15 RX ADMIN — SENNOSIDES AND DOCUSATE SODIUM 2 TABLET: 8.6; 5 TABLET ORAL at 08:01

## 2024-01-15 RX ADMIN — ACETAMINOPHEN 1000 MG: 500 TABLET ORAL at 09:01

## 2024-01-15 RX ADMIN — OSELTAMIVIR PHOSPHATE 30 MG: 30 CAPSULE ORAL at 09:01

## 2024-01-15 RX ADMIN — SODIUM BICARBONATE 650 MG: 650 TABLET ORAL at 02:01

## 2024-01-15 RX ADMIN — POLYETHYLENE GLYCOL 3350 17 G: 17 POWDER, FOR SOLUTION ORAL at 09:01

## 2024-01-15 RX ADMIN — FINASTERIDE 5 MG: 5 TABLET, FILM COATED ORAL at 09:01

## 2024-01-15 RX ADMIN — SENNOSIDES AND DOCUSATE SODIUM 2 TABLET: 8.6; 5 TABLET ORAL at 09:01

## 2024-01-15 RX ADMIN — TAMSULOSIN HYDROCHLORIDE 0.8 MG: 0.4 CAPSULE ORAL at 09:01

## 2024-01-15 RX ADMIN — Medication 500 MG: at 09:01

## 2024-01-15 RX ADMIN — ENOXAPARIN SODIUM 30 MG: 30 INJECTION SUBCUTANEOUS at 05:01

## 2024-01-15 RX ADMIN — METHOCARBAMOL 500 MG: 500 TABLET ORAL at 08:01

## 2024-01-15 RX ADMIN — MIRTAZAPINE 7.5 MG: 7.5 TABLET, FILM COATED ORAL at 08:01

## 2024-01-15 RX ADMIN — Medication 6 MG: at 08:01

## 2024-01-15 RX ADMIN — ZINC SULFATE 220 MG (50 MG) CAPSULE 220 MG: CAPSULE at 09:01

## 2024-01-15 RX ADMIN — ASPIRIN 81 MG CHEWABLE TABLET 81 MG: 81 TABLET CHEWABLE at 09:01

## 2024-01-15 RX ADMIN — Medication 500 MG: at 08:01

## 2024-01-15 RX ADMIN — Medication 2 TABLET: at 09:01

## 2024-01-15 RX ADMIN — METHOCARBAMOL 500 MG: 500 TABLET ORAL at 02:01

## 2024-01-15 RX ADMIN — POLYETHYLENE GLYCOL 3350 17 G: 17 POWDER, FOR SOLUTION ORAL at 08:01

## 2024-01-15 NOTE — PLAN OF CARE
Problem: Adult Inpatient Plan of Care  Goal: Plan of Care Review  Outcome: Ongoing, Progressing  Goal: Patient-Specific Goal (Individualized)  Outcome: Ongoing, Progressing  Goal: Absence of Hospital-Acquired Illness or Injury  Outcome: Ongoing, Progressing  Goal: Optimal Comfort and Wellbeing  Outcome: Ongoing, Progressing  Goal: Readiness for Transition of Care  Outcome: Ongoing, Progressing     Problem: Fall Injury Risk  Goal: Absence of Fall and Fall-Related Injury  Outcome: Ongoing, Progressing     Problem: Skin Injury Risk Increased  Goal: Skin Health and Integrity  Outcome: Ongoing, Progressing     Problem: Impaired Wound Healing  Goal: Optimal Wound Healing  Outcome: Ongoing, Progressing     Problem: Infection  Goal: Absence of Infection Signs and Symptoms  Outcome: Ongoing, Progressing     Problem: Malnutrition  Goal: Improved Nutritional Intake  Outcome: Ongoing, Progressing

## 2024-01-15 NOTE — PROGRESS NOTES
[facility]                                  Skilled Nursing Facility                   Progress Note     Patient Name: Dimitri Johnson  YOB: 1933  MRN: [id number]  Room: [id number]/[id number] A     Admit Date: 12/26/2023   PALOMO: 1/23/2024     Principal Problem:  Weakness    HPI obtained from patient interview and chart review     Chief Complaint: Re-evaluation of medical treatment and therapy status, pain management, lab review    HPI:   Dimitri Johnson is a 89 y.o. male admitted to [facility] 12/22 with weakness and constipation. He had associated stomach discomfort that was relieved after having a large bowel movement. Daily miralax and prn dulcolax regimen started. Patient has decreased mobility due to his degenerative disc disease and deconditioning. He is hesitant to walk and stand up. He worked with PT and OT who both recommended moderate intensity therapy.     Patient will be treated at [facility] with PT and OT to improve functional status and ability to perform ADLs.     Interval history:  24 hour chart review completed. Pertinent lab, micro, and/or radiology results addressed below  No complaint of pain since Ramirez catheter replaced. Urology appt scheduled for 1/16.  Vitals: Afebrile, VSS.  I/O: Patient eating 100% meals. Order is in place for assist with feedings due to blindness.  LBM 01/14.  Ramirez cath  in place, draining CYU.   Skilled Therapy: Patient progressing with therapy as tolerated and would continue to benefit from skilled PT and OT services to improve overall functional mobility and independence, strength, endurance, and safety.   Family interested in placement case management to facilitate.    Past Medical History: Patient has a past medical history of Glaucoma and Hypertension. 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    Social History: Patient reports that he has never smoked. He does not have any smokeless tobacco history on file. He reports current alcohol use.    Family History:  non contributory    Allergies: Patient is allergic to pcn [penicillins].        ROS  Constitutional:  Negative for fever.  Positive forgetfulness  HENT:  Negative for sore throat.    Eyes:  Negative for redness. + limited vision  Respiratory:  Negative for shortness of breath.    Cardiovascular:  Negative for chest pain.   Gastrointestinal:  Negative for nausea. Positive rectal pain  Genitourinary:  Positive urinary frequency and urgency  Musculoskeletal:  Negative for back pain.    Skin:  Negative for rash.   Neurological:  Positive for weakness.   Hematological:  Does not bruise/bleed easily.   Psychiatric/Behavioral: Negative insomnia      24 hour Vital Sign Range   Temp:  [98 °F (36.7 °C)-98.2 °F (36.8 °C)]   Pulse:  [76-82]   Resp:  [17-18]   BP: (126-138)/(62-67)   SpO2:  [96 %]       Physical Exam  General: Alert, frail, thin, oriented x3 poor short-term memory and recall  HENT: Atraumatic, normocephalic, Right eye keteroplasty/blind, left eye poor vision, hard of hearing  CV: RRR; no murmurs, Normal s1, s2, no peripheral edema  Resp: CTAB with normal work of breathing and chest excursion on room air.   Abd: Soft, NTND. Bowel sounds normoactive. No external hemorrhoids or prolapse visible  MSK: Generalized weakness  :  +Ramirez cath  Neuro: Normal appearing coordination and sensory function.   Skin: No rashes or lesions noted, dry, warm, dressing to left ankle,  dermatitis sacrum  Psych: calm, cooperative, yells out at time         Labs:  Recent Labs   Lab 01/11/24  0441 01/15/24  0643   WBC 10.35 7.68   HGB 13.5* 12.6*   HCT 40.4 38.9*    282       Recent Labs   Lab 01/11/24  0441 01/15/24  0643    137   K 4.0 4.9    107   CO2 22* 21*   BUN 40*  "34*   CREATININE 1.2 1.2   GLU 84 82   CALCIUM 9.3 9.2   MG 2.1 2.1   PHOS 3.1 4.0       No results for input(s): "ALKPHOS", "ALT", "AST", "ALBUMIN", "PROT", "BILITOT", "INR" in the last 168 hours.    No results for input(s): "POCTGLUCOSE" in the last 72 hours.    Meds Scheduled:   acetaminophen  1,000 mg Oral Q8H    ascorbic acid (vitamin C)  500 mg Oral BID    aspirin  81 mg Oral Daily    calcium-vitamin D3  2 tablet Oral Daily    enoxparin  30 mg Subcutaneous Q24H (prophylaxis, 1700)    finasteride  5 mg Oral Daily    melatonin  6 mg Oral Nightly    methocarbamoL  500 mg Oral TID    mirtazapine  7.5 mg Oral QHS    oseltamivir  30 mg Oral Daily    polyethylene glycol  17 g Oral BID    senna-docusate 8.6-50 mg  2 tablet Oral BID    sodium bicarbonate  650 mg Oral Daily    tamsulosin  0.8 mg Oral Daily    zinc sulfate  220 mg Oral Daily       PRN:   bisacodyL, calcium carbonate, hydrALAZINE, HYDROcodone-acetaminophen      Assessment and Plan:    Severe malnutrition  Poor appetite  Body mass index is 19.34 kg/m²., previous weight unknown  RD following  Continue Remeron  Patient reports fair appetite.   Order in place for assist with feedings due to blindness.     Urinary retention  Dysuria  Left renal cyst and Prostatomegaly found on recent CT needs Urology outpatient   Urine culture with staph however previous culture negative, asymptomatic   vitamin C, finasteride  -12/29 Failed void trial, Ramirez now removed with residuals 200-400 mL  - Avoid constipation  -  7 day doxy course for suspected UTI completed 1/6.    - 1/10 Bladder scan q6h in and out cath only for residual >300ml or patient discomfort. Urology appt 1/16.  Treat rectal constipation seen on KUB  1/11 Ramirez cath replaced due to patient's severe discomfort  1/12  no complain of pain today    Constipation  CT- Limited examination. Moderately large liquid stool within the patulous rectum.  Question possible diarrheal illness and/or fecal impaction. Left " renal cyst. Prostatomegaly. Osseous changes particularly of the spine.  - Miralax daily, senokot bid  - Encourge oral intake, assist with eating and drinking d/t visual impairment  - Avoid narcotics  -12/29 KUB no constipation  -1/1 Lactulose 30 g x 1   -1/6 + BM (first BM produced without aid of suppository since hospitalization)  1/9: no BM x3 days with rectal discomfort, urinary retention today. Increase senna to 2 tabs BID, miralax to BID. KUB ordered. Discontinue norvasc causes constipation  1/10 suppository daily x3  1/12 last BM today    Degenerative disc disease  Osteoporosis  Muscle spasms   Continue Robaxin QID PRN, APAP  Norco 5 mg q8h PRN use with caution due to constipation    Hyponatremia  Hypocalcemia  Dehydration  Encourage oral intake  Monitor on routine lab  Calcium with vitamin-D    Right lower extremity Baker's cyst  Noted on venous ultrasound  Continue ibuprofen prn pain, ice  Ambulatory referral to orthopedics    Insomnia  Scheduled melatonin  Remeron 7.5 mg nightly     Weakness  Debility  Patient uses walker at baseline. His son reported  prior to admit he was unable to get off of toilet and ambulate.    B12 WNL TSH 8.7 but T4 WNL   PT/OT consult   Fall precaution  Lovenox 30mg daily for VTE ppx    Moisture associated dermatitis sacrum   Triad BID   Consult wound care   Vitamin-C and zinc     Primary hypertension  Home Norvasc 5 mg b.i.d.       1/9 Discontinue Norvasc causes constipation. PRN hydralazine for now, will add scheduled medication based on trends  1/12 BP acceptable 170/79, p.r.n. hydralazine available if persistent    Anticipate disposition:    Nursing facility TBD    IP [facility] RISK OF UNPLANNED READMISSION Model: LOW      Follow-up needed during SNF admission:   Urology clinic 1/16    Follow-up needed after discharge from SNF:   - PCP within 1-2 weeks  - See appt scheduled below     Future Appointments   Date Time Provider Department Center   1/16/2024 10:00 AM Vinh Moon MD  [facility] UROLOGY Be Reich I certify that SNF services are required to be given on an inpatient basis because Dimitri Johnson needs for skilled nursing care and/or skilled rehabilitation are required on a daily basis and such services can only practically be provided in a skilled nursing facility setting and are for an ongoing condition for which she received inpatient care in the hospital.       Extended Visit:   Total time spent: 46 minutes  Description of Time: counseling provided on clinical condition, therapies provided, plan of care, emotional support, coordinating patient care with other care team members, reviewing and interpreting labs and imaging, collaboration with physician, initiating new orders, chart review, and documentation. See interval hx.       Nunu Suárez NP  Department of Hospital Medicine   [facility]     DOS: 1/15/2024       Patient note was created using MModal Dictation.  Any errors in syntax or even information may not have been identified and edited on initial review prior to signing this note.

## 2024-01-16 ENCOUNTER — OFFICE VISIT (OUTPATIENT)
Dept: UROLOGY | Facility: CLINIC | Age: 89
End: 2024-01-16
Payer: MEDICARE

## 2024-01-16 VITALS
SYSTOLIC BLOOD PRESSURE: 137 MMHG | HEART RATE: 69 BPM | HEIGHT: 71 IN | BODY MASS INDEX: 19.32 KG/M2 | DIASTOLIC BLOOD PRESSURE: 78 MMHG | WEIGHT: 138 LBS

## 2024-01-16 DIAGNOSIS — Z96.0 INDWELLING CATHETER PRESENT ON ADMISSION: ICD-10-CM

## 2024-01-16 DIAGNOSIS — N30.90 CYSTITIS: ICD-10-CM

## 2024-01-16 DIAGNOSIS — R33.9 URINARY RETENTION: Primary | ICD-10-CM

## 2024-01-16 DIAGNOSIS — N40.1 ENLARGED PROSTATE WITH URINARY OBSTRUCTION: ICD-10-CM

## 2024-01-16 DIAGNOSIS — N13.8 ENLARGED PROSTATE WITH URINARY OBSTRUCTION: ICD-10-CM

## 2024-01-16 LAB
BASOPHILS # BLD AUTO: 0.05 K/UL (ref 0–0.2)
BASOPHILS NFR BLD: 0.6 % (ref 0–1.9)
DIFFERENTIAL METHOD BLD: ABNORMAL
EOSINOPHIL # BLD AUTO: 0.3 K/UL (ref 0–0.5)
EOSINOPHIL NFR BLD: 3.5 % (ref 0–8)
ERYTHROCYTE [DISTWIDTH] IN BLOOD BY AUTOMATED COUNT: 13.2 % (ref 11.5–14.5)
HCT VFR BLD AUTO: 38.1 % (ref 40–54)
HGB BLD-MCNC: 12.4 G/DL (ref 14–18)
IMM GRANULOCYTES # BLD AUTO: 0.02 K/UL (ref 0–0.04)
IMM GRANULOCYTES NFR BLD AUTO: 0.3 % (ref 0–0.5)
LYMPHOCYTES # BLD AUTO: 2.5 K/UL (ref 1–4.8)
LYMPHOCYTES NFR BLD: 32.1 % (ref 18–48)
MCH RBC QN AUTO: 31.4 PG (ref 27–31)
MCHC RBC AUTO-ENTMCNC: 32.5 G/DL (ref 32–36)
MCV RBC AUTO: 97 FL (ref 82–98)
MONOCYTES # BLD AUTO: 0.6 K/UL (ref 0.3–1)
MONOCYTES NFR BLD: 8.1 % (ref 4–15)
NEUTROPHILS # BLD AUTO: 4.4 K/UL (ref 1.8–7.7)
NEUTROPHILS NFR BLD: 55.4 % (ref 38–73)
NRBC BLD-RTO: 0 /100 WBC
PLATELET # BLD AUTO: 286 K/UL (ref 150–450)
PMV BLD AUTO: 10.9 FL (ref 9.2–12.9)
RBC # BLD AUTO: 3.95 M/UL (ref 4.6–6.2)
WBC # BLD AUTO: 7.92 K/UL (ref 3.9–12.7)

## 2024-01-16 PROCEDURE — 85025 COMPLETE CBC W/AUTO DIFF WBC: CPT | Performed by: HOSPITALIST

## 2024-01-16 PROCEDURE — 99204 OFFICE O/P NEW MOD 45 MIN: CPT | Mod: S$GLB,,, | Performed by: UROLOGY

## 2024-01-16 PROCEDURE — 99999 PR PBB SHADOW E&M-EST. PATIENT-LVL II: CPT | Mod: PBBFAC,,, | Performed by: UROLOGY

## 2024-01-16 PROCEDURE — 11000004 HC SNF PRIVATE

## 2024-01-16 PROCEDURE — 97530 THERAPEUTIC ACTIVITIES: CPT

## 2024-01-16 PROCEDURE — 63600175 PHARM REV CODE 636 W HCPCS: Performed by: FAMILY MEDICINE

## 2024-01-16 PROCEDURE — 97110 THERAPEUTIC EXERCISES: CPT

## 2024-01-16 PROCEDURE — 25000003 PHARM REV CODE 250: Performed by: HOSPITALIST

## 2024-01-16 PROCEDURE — 25000003 PHARM REV CODE 250: Performed by: FAMILY MEDICINE

## 2024-01-16 PROCEDURE — 36415 COLL VENOUS BLD VENIPUNCTURE: CPT | Performed by: HOSPITALIST

## 2024-01-16 PROCEDURE — 25000003 PHARM REV CODE 250: Performed by: INTERNAL MEDICINE

## 2024-01-16 RX ORDER — NITROFURANTOIN 25; 75 MG/1; MG/1
100 CAPSULE ORAL 2 TIMES DAILY
Qty: 20 CAPSULE | Refills: 0 | Status: SHIPPED | OUTPATIENT
Start: 2024-01-16 | End: 2024-01-22 | Stop reason: HOSPADM

## 2024-01-16 RX ADMIN — ENOXAPARIN SODIUM 30 MG: 30 INJECTION SUBCUTANEOUS at 05:01

## 2024-01-16 RX ADMIN — Medication 2 TABLET: at 08:01

## 2024-01-16 RX ADMIN — ACETAMINOPHEN 1000 MG: 500 TABLET ORAL at 05:01

## 2024-01-16 RX ADMIN — Medication 6 MG: at 09:01

## 2024-01-16 RX ADMIN — SENNOSIDES AND DOCUSATE SODIUM 2 TABLET: 8.6; 5 TABLET ORAL at 09:01

## 2024-01-16 RX ADMIN — MIRTAZAPINE 7.5 MG: 7.5 TABLET, FILM COATED ORAL at 09:01

## 2024-01-16 RX ADMIN — METHOCARBAMOL 500 MG: 500 TABLET ORAL at 09:01

## 2024-01-16 RX ADMIN — Medication 500 MG: at 08:01

## 2024-01-16 RX ADMIN — OSELTAMIVIR PHOSPHATE 30 MG: 30 CAPSULE ORAL at 08:01

## 2024-01-16 RX ADMIN — METHOCARBAMOL 500 MG: 500 TABLET ORAL at 02:01

## 2024-01-16 RX ADMIN — SODIUM BICARBONATE 650 MG: 650 TABLET ORAL at 08:01

## 2024-01-16 RX ADMIN — TAMSULOSIN HYDROCHLORIDE 0.8 MG: 0.4 CAPSULE ORAL at 08:01

## 2024-01-16 RX ADMIN — ACETAMINOPHEN 1000 MG: 500 TABLET ORAL at 02:01

## 2024-01-16 RX ADMIN — ZINC SULFATE 220 MG (50 MG) CAPSULE 220 MG: CAPSULE at 08:01

## 2024-01-16 RX ADMIN — ASPIRIN 81 MG CHEWABLE TABLET 81 MG: 81 TABLET CHEWABLE at 08:01

## 2024-01-16 RX ADMIN — FINASTERIDE 5 MG: 5 TABLET, FILM COATED ORAL at 08:01

## 2024-01-16 RX ADMIN — HYDROCODONE BITARTRATE AND ACETAMINOPHEN 1 TABLET: 5; 325 TABLET ORAL at 03:01

## 2024-01-16 RX ADMIN — Medication 500 MG: at 09:01

## 2024-01-16 RX ADMIN — SENNOSIDES AND DOCUSATE SODIUM 2 TABLET: 8.6; 5 TABLET ORAL at 08:01

## 2024-01-16 RX ADMIN — ACETAMINOPHEN 1000 MG: 500 TABLET ORAL at 09:01

## 2024-01-16 RX ADMIN — METHOCARBAMOL 500 MG: 500 TABLET ORAL at 08:01

## 2024-01-16 RX ADMIN — POLYETHYLENE GLYCOL 3350 17 G: 17 POWDER, FOR SOLUTION ORAL at 09:01

## 2024-01-16 RX ADMIN — POLYETHYLENE GLYCOL 3350 17 G: 17 POWDER, FOR SOLUTION ORAL at 08:01

## 2024-01-16 NOTE — PROGRESS NOTES
CC: recurrent urinary retetnion    Dimitri Johnson is a 89 y.o. man who is here for the evaluation of Urinary Retention (Ramirez --placed --pt failed Voiding trial 12/29-pt had in and out cathter done for about 10 days. Ramirez  replaced 1/11/24)    A new pt referred by his PCP, Everette Neville MD   He came today by a transportation from [facility] unit.    Mr Mosley was admitted with weakness and constipation. He had associated stomach discomfort that was relieved after having a large bowel movement. Daily miralax and prn dulcolax regimen started. Patient has decreased mobility due to his degenerative disc disease and deconditioning. He is hesitant to walk and stand up. His strength is intact. He worked with PT and OT who both recommended moderate intensity therapy. Discussed SNF therapy with Mr Mosley and he has been in agreement to go.  Updated son by voicemail, as I have been unable ot reach by phone since the second day of admission. Patient to be discharged to [facility] this evening.       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  Lives with his son.  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    Past Medical History:   Diagnosis Date    Glaucoma     Hypertension      Past Surgical History:   Procedure Laterality Date    CORNEAL TRANSPLANT Right     CORNEAL TRANSPLANT Left 7/17/14    DSEK     Social History     Tobacco Use    Smoking status: Never   Substance Use Topics    Alcohol use: Yes     Comment: once a month     No family history on file.  Allergy:  Review of patient's allergies indicates:   Allergen Reactions    Pcn [penicillins]      Facility-Administered Encounter Medications as of 1/16/2024   Medication Dose Route Frequency Provider Last Rate Last Admin    acetaminophen tablet 1,000 mg  1,000 mg Oral Q8H Camilla Gonzalez APRN   1,000 mg at 01/16/24 0546    ascorbic acid (vitamin C) tablet 500 mg  500 mg Oral BID Camilla Gonzalez APRN   500 mg at 01/16/24 0817    aspirin  chewable tablet 81 mg  81 mg Oral Daily Kang Green MD   81 mg at 24 0816    bisacodyL suppository 10 mg  10 mg Rectal Daily PRN Kang Green MD   10 mg at 24 0524    [] bisacodyL suppository 10 mg  10 mg Rectal QHS Camilla Gonzalez, APRN   10 mg at 24    calcium carbonate 200 mg calcium (500 mg) chewable tablet 500 mg  500 mg Oral BID PRN Kang Green MD   500 mg at 24    calcium-vitamin D3 500 mg-5 mcg (200 unit) per tablet 2 tablet  2 tablet Oral Daily Camilla Gonzalez APRN   2 tablet at 24 0817    enoxaparin injection 30 mg  30 mg Subcutaneous Q24H (prophylaxis, 1700) Camilla Gonzalez, APRN   30 mg at 01/15/24 1703    finasteride tablet 5 mg  5 mg Oral Daily Kang Green MD   5 mg at 24 0819    hydrALAZINE tablet 50 mg  50 mg Oral Q8H PRN Camilla Gonzalez, APRN        HYDROcodone-acetaminophen 5-325 mg per tablet 1 tablet  1 tablet Oral Q8H PRN Camilla Gonzalez, APRN   1 tablet at 24 0332    melatonin tablet 6 mg  6 mg Oral Nightly Camilla Gonzalez, APRN   6 mg at 01/15/24 2006    methocarbamoL tablet 500 mg  500 mg Oral TID Camilla Gonzalez, APRN   500 mg at 24 0817    mirtazapine tablet 7.5 mg  7.5 mg Oral QHS Camilla Gonzalez, APRN   7.5 mg at 01/15/24 2006    oseltamivir capsule 30 mg  30 mg Oral Daily Camilla Gonzalez, APRN   30 mg at 24 0820    polyethylene glycol packet 17 g  17 g Oral BID Camilla Gonzalez, APRN   17 g at 24 0825    senna-docusate 8.6-50 mg per tablet 2 tablet  2 tablet Oral BID Camilla Gonzalez, APRN   2 tablet at 24 0824    sodium bicarbonate tablet 650 mg  650 mg Oral Daily Jose Armando, Nunu, NP   650 mg at 24 0819    tamsulosin 24 hr capsule 0.8 mg  0.8 mg Oral Daily CONCEPCIÓN Soriano MD   0.8 mg at 24 0816    zinc sulfate capsule 220 mg  220 mg Oral Daily Camilla Gonzalez APRN   220 mg at 24 0818    [DISCONTINUED] acetaminophen tablet 650 mg  650 mg Oral Q6H PRN Kang Green MD    650 mg at 12/29/23 0723    [DISCONTINUED] methocarbamoL tablet 500 mg  500 mg Oral Q6H PRN Camilla Gonzalez, APRN   500 mg at 01/07/24 1255    [DISCONTINUED] tamsulosin 24 hr capsule 0.4 mg  0.4 mg Oral Daily Kang Green MD   0.4 mg at 01/10/24 0922     Outpatient Encounter Medications as of 1/16/2024   Medication Sig Dispense Refill    amlodipine (NORVASC) 5 MG tablet Take 5 mg by mouth 2 (two) times daily.      aspirin 81 MG Chew Take 81 mg by mouth once daily.      bisacodyL (DULCOLAX) 10 mg Supp Place 1 suppository (10 mg total) rectally daily as needed (constipation). 10 suppository 0    finasteride (PROSCAR) 5 mg tablet Take 5 mg by mouth once daily.      nitrofurantoin, macrocrystal-monohydrate, (MACROBID) 100 MG capsule Take 1 capsule (100 mg total) by mouth 2 (two) times daily. Start Macrobid 7 days before his next visit prior to voiding trial for 10 days 20 capsule 0    polyethylene glycol (GLYCOLAX) 17 gram PwPk Take 17 g by mouth once daily. 30 each 0    tamsulosin (FLOMAX) 0.4 mg Cap Take 1 capsule (0.4 mg total) by mouth once daily. 1 capsule 0     Review of Systems   ROS  Physical Exam     Vitals:    01/16/24 0924   BP: 137/78   Pulse: 69     Physical Exam  Constitutional:       General: He is not in acute distress.     Appearance: He is well-developed. He is not diaphoretic.   HENT:      Head: Normocephalic and atraumatic.      Right Ear: External ear normal.      Left Ear: External ear normal.      Nose: Nose normal.   Eyes:      Conjunctiva/sclera: Conjunctivae normal.      Pupils: Pupils are equal, round, and reactive to light.   Neck:      Thyroid: No thyromegaly.      Vascular: No JVD.      Trachea: No tracheal deviation.   Cardiovascular:      Rate and Rhythm: Normal rate and regular rhythm.      Heart sounds: Normal heart sounds. No murmur heard.     No friction rub. No gallop.   Pulmonary:      Effort: Pulmonary effort is normal. No respiratory distress.      Breath sounds: Normal  "breath sounds. No wheezing.   Chest:      Chest wall: No tenderness.   Abdominal:      General: Bowel sounds are normal. There is no distension.      Palpations: Abdomen is soft. There is no mass.      Tenderness: There is no abdominal tenderness. There is no guarding or rebound.   Genitourinary:     Penis: Normal. No tenderness.       Rectum: Normal.      Comments: Ramirez catheter in place with clear urine.  Musculoskeletal:         General: No tenderness or deformity. Normal range of motion.      Cervical back: Normal range of motion and neck supple.   Lymphadenopathy:      Cervical: No cervical adenopathy.   Skin:     General: Skin is warm and dry.   Neurological:      Mental Status: He is alert and oriented to person, place, and time.   Psychiatric:         Behavior: Behavior normal.         Thought Content: Thought content normal.       Genitalia:  Scrotum: no rash or lesion  Normal symmetric epididymis without masses  Normal vas palpated  Normal size, symmetric testicles with no masses   Normal urethral meatus with no discharge  Normal circumcised penis with no lesion   Rectal:  Normal perineum and anus upon inspection.  Normal tone, no masses or tenderness;     LABS:  No results found for: "PSA", "PSADIAG", "PSATOTAL", "PSAFREE", "PSAFREEPCT"  No results found for this or any previous visit.  Lab Results   Component Value Date    CREATININE 1.2 01/15/2024    CREATININE 1.2 01/11/2024    CREATININE 1.1 01/08/2024     No results found for this or any previous visit.  Urine Culture, Routine   Date Value Ref Range Status   12/29/2023 (A)  Final    COAGULASE-NEGATIVE STAPHYLOCOCCUS SPECIES  10,000 - 49,999 cfu/ml  Susceptibility testing not routinely performed.       No results found for: "HGBA1C"    Radiology:  CT 12/22/23  Limited examination. Moderately large liquid stool within the patulous rectum.  Question possible diarrheal illness and/or fecal impaction.     Left renal cyst.     Prostatomegaly.     Osseous " changes particularly of the spine.    Assessment and Plan:  Dimitri was seen today for urinary retention.    Diagnoses and all orders for this visit:    Urinary retention    Enlarged prostate with urinary obstruction    Cystitis  -     nitrofurantoin, macrocrystal-monohydrate, (MACROBID) 100 MG capsule; Take 1 capsule (100 mg total) by mouth 2 (two) times daily. Start Macrobid 7 days before his next visit prior to voiding trial for 10 days    Indwelling catheter present on admission  -     nitrofurantoin, macrocrystal-monohydrate, (MACROBID) 100 MG capsule; Take 1 capsule (100 mg total) by mouth 2 (two) times daily. Start Macrobid 7 days before his next visit prior to voiding trial for 10 days    He has an enlarged prostate with obstruction.  He failed a voiding trial on 1/11/24 and a new gray catheter is in place.  He began flomax on 1/11/24 and finasteride in Dec. 2023 based on what I can figure out.    So I am going to recommend the followings:  Continue flomax and finasteride  Start Macrobid 1 wk before his next voiding trial on 2/6/24.  Please make sure that his son or family member comes with pt on his next clinic visit to discuss a long term management, in case he fails to void again.  We may consider Rezum Therapy if he fails to void on his own, unless pt prefers to manage with an indwelling catheter or CIC.  Treat chronic constipation, which can precipitate recurrent urinary retention.    I explained all these to pt but pt does have a memory problem.    Follow-up:  Follow up in about 3 weeks (around 2/6/2024), or voiding trial.

## 2024-01-16 NOTE — CARE UPDATE
Discharge facilities  [facility], [facility], [facility]- referral faxed-left a message    [facility]- sent fax, Geraldine will follow up when she receives and reviews.    [facility]-sent referral    [facility]-sent referral, admission vm full    [facility], [facility] filled out [facility] request for info, faxed referral to [phone]  Fax email says went through      401 pm    Called back Lucille, left a message for her, called her brother, Tanner (they live together) also left him a detailed message; Geraldine from [facility] is awaiting fax; will follow up with family and SS (spoke to today)    Sent information to different facilities and awaiting questions/decisions on placement

## 2024-01-16 NOTE — PT/OT/SLP PROGRESS
"Occupational Therapy   Treatment    Name: Dimitri Johnson  MRN: [id number]  Admit Date: 12/26/2023  Admitting Diagnosis:  Weakness    General Precautions: Standard, fall, vision impaired, aspiration   Orthopedic Precautions: N/A   Braces: N/A    Recommendations:     Discharge Recommendations:  home health OT  Level of Assistance Recommended at Discharge: 24 hours light assistance for ADL's and homemaking tasks  Discharge Equipment Recommendations: 3-in-1 commode, wheelchair  Barriers to discharge:  Decreased caregiver support, Inaccessible home environment    Assessment:     Dimitri Johnson is a 89 y.o. male with a medical diagnosis of Weakness.   Pt tolerated session well and without incident, but he continues to require assistance to perform self-care tasks and mobility.  He would continue to benefit from skilled OT services at [facility] to maximize his gains in functional independence.  He presents with the following.  Performance deficits affecting function are weakness, impaired endurance, impaired self care skills, impaired functional mobility, gait instability, visual deficits, impaired balance, decreased lower extremity function, decreased safety awareness.     Rehab Potential is good    Activity tolerance:  Good    Plan:     Patient to be seen 5 x/week to address the above listed problems via self-care/home management, therapeutic activities, therapeutic exercises    Plan of Care Expires: 01/25/24  Plan of Care Reviewed with: patient    Subjective   "I went to my appointment earlier, and it was so cold outside."  Communicated with: nurse prior to session.    Pain/Comfort:  Pain Rating 1: 0/10  Pain Rating Post-Intervention 1: 0/10    Patient's cultural, spiritual, Mosque conflicts given the current situation:  no    Objective:     Patient found up in wheelchair with gray catheter upon OT entry to room.    Bed Mobility:    N/A due to pt sitting in w/c at beginning and at end of session    Functional " Mobility/Transfers:  Patient completed Sit <> Stand Transfer from w/c x 1 trial with minimum assistance with rolling walker     Activities of Daily Living:  Pt already performed    [facility] 6 Click ADL: 17    OT Exercises: UE ergometer performed for 10 min and 20 sec on minimum resistance to increase functional endurance and strength in order increase independence when performing self care tasks, functional ambulation, W/C propulsion, and functional standing activities.     Treatment & Education:  - Pt performed matching activity in standing to address his standing endurance that's required for daily activities, such as ADLs and functional transfers.   Activity facilitated functional reach, crossing midline (RUE into L visual field), and weight shifting in multiple directions.  He stood 6 min and 34 sec with CGA-Min A with RW at the rehab gym counter, requiring intermittent cueing and reassurance to accurately match items.  Task graded with board tilted at an angle to have more light shine on it due to pt's visual deficits.    Pt edu on role of OT, POC, safety when performing self care tasks, benefit of performing OOB activity, and safety when performing functional transfers and mobility.    - Self care tasks completed-- as noted above      Patient left up in chair with all lines intact and call button in reach    GOALS:   Multidisciplinary Problems       Occupational Therapy Goals          Problem: Occupational Therapy    Goal Priority Disciplines Outcome Interventions   Occupational Therapy Goal     OT, PT/OT Ongoing, Progressing    Description: Goals to be met by: 1/17/2024     Patient will increase functional independence with ADLs by performing:    UE Dressing with Modified [place]- Ongoing  LE Dressing with Stand-by Assistance-Ongoing  Grooming while seated at sink with Modified [place]- Ongoing  Toileting from toilet with Stand-by Assistance for hygiene and clothing management.   Bathing from  sitting/standing at sink with Stand-by Assistance.  Supine to sit with Modified [place].  Step transfer with Stand-by Assistance with RW.  Toilet transfer to toilet with Stand-by Assistance with RW.  Footwear /c Mod A and AE as needed  Tolerate standing functionals tasks for ~5 minutes /c CGA and RW as needed                         Time Tracking:     OT Date of Treatment: 01/16/24  OT Start Time: 1522    OT Stop Time: 1557  OT Total Time (min): 35 min    Billable Minutes:Therapeutic Activity 20 min  Therapeutic Exercise 15 min    1/16/2024

## 2024-01-16 NOTE — PROGRESS NOTES
[facility]                                  Skilled Nursing Facility                   Progress Note     Patient Name: Dimitri Johnson  YOB: 1933  MRN: [id number]  Room: SUPP993/COQX298 A     Admit Date: 12/26/2023   PALOMO: 1/23/2024     Principal Problem:  Weakness    HPI obtained from patient interview and chart review     Chief Complaint: Re-evaluation of medical treatment and therapy status, pain management, lab review, Urology rec review    HPI:   Dimitri Johnson is a 89 y.o. male admitted to [facility] 12/22 with weakness and constipation. He had associated stomach discomfort that was relieved after having a large bowel movement. Daily miralax and prn dulcolax regimen started. Patient has decreased mobility due to his degenerative disc disease and deconditioning. He is hesitant to walk and stand up. He worked with PT and OT who both recommended moderate intensity therapy.     Patient will be treated at [facility] with PT and OT to improve functional status and ability to perform ADLs.     Interval history:  24 hour chart review completed. Pertinent lab, micro, and/or radiology results addressed below  No complaint of pain since Gray catheter replaced.   Patient seen by Urology in clinic today. Recs to keep gray, continue flomax and finasteride. Urology f/up in 3-4 weeks for void trial in clinic. Son or family to be present at next appt, may need to discuss long term otpions if fails trial. Pt to start macrobid 1 wk prior to next appt. Continue to tx chronic constipation.  Vitals: Afebrile, VSS.  I/O: Patient eating 100% meals. Order is in place for assist with feedings due to blindness. LBM 01/14.  Will need suppository admin tomorrow if no BM. Gray cath  in place, draining CYU.   Skilled Therapy: Patient progressing with therapy as tolerated and would continue to benefit from skilled PT and OT services to improve overall  functional mobility and independence, strength, endurance, and safety.   Family interested in placement, case management to facilitate.    Past Medical History: Patient has a past medical history of Glaucoma and Hypertension. 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    Social History: Patient reports that he has never smoked. He does not have any smokeless tobacco history on file. He reports current alcohol use.    Family History:  non contributory    Allergies: Patient is allergic to pcn [penicillins].        ROS  Constitutional:  Negative for fever.  Positive forgetfulness  HENT:  Negative for sore throat.    Eyes:  Negative for redness. + limited vision  Respiratory:  Negative for shortness of breath.    Cardiovascular:  Negative for chest pain.   Gastrointestinal:  Negative for nausea. Positive rectal pain  Genitourinary:  Positive urinary frequency and urgency  Musculoskeletal:  Negative for back pain.    Skin:  Negative for rash.   Neurological:  Positive for weakness.   Hematological:  Does not bruise/bleed easily.   Psychiatric/Behavioral: Negative insomnia      24 hour Vital Sign Range   Temp:  [98.2 °F (36.8 °C)-98.6 °F (37 °C)]   Pulse:  [69-77]   Resp:  [17-18]   BP: (137-156)/(68-79)   SpO2:  [96 %-97 %]       Physical Exam  General: Alert, frail, thin, oriented x3 poor short-term memory and recall  HENT: Atraumatic, normocephalic, Right eye keteroplasty/blind, left eye poor vision, hard of hearing  CV: RRR; no murmurs, Normal s1, s2, no peripheral edema  Resp: CTAB with normal work of breathing and chest excursion on room air.   Abd: Soft, NTND. Bowel sounds normoactive. No external hemorrhoids or prolapse visible  MSK: Generalized weakness  :  +Ramirez cath  Neuro: Normal appearing coordination and sensory function.   Skin: No  "rashes or lesions noted, dry, warm, dressing to left ankle,  dermatitis sacrum  Psych: calm, cooperative, yells out at time         Labs:  Recent Labs   Lab 01/11/24  0441 01/15/24  0643 01/16/24  0411   WBC 10.35 7.68 7.92   HGB 13.5* 12.6* 12.4*   HCT 40.4 38.9* 38.1*    282 286       Recent Labs   Lab 01/11/24  0441 01/15/24  0643    137   K 4.0 4.9    107   CO2 22* 21*   BUN 40* 34*   CREATININE 1.2 1.2   GLU 84 82   CALCIUM 9.3 9.2   MG 2.1 2.1   PHOS 3.1 4.0       No results for input(s): "ALKPHOS", "ALT", "AST", "ALBUMIN", "PROT", "BILITOT", "INR" in the last 168 hours.    No results for input(s): "POCTGLUCOSE" in the last 72 hours.    Meds Scheduled:   acetaminophen  1,000 mg Oral Q8H    ascorbic acid (vitamin C)  500 mg Oral BID    aspirin  81 mg Oral Daily    calcium-vitamin D3  2 tablet Oral Daily    enoxparin  30 mg Subcutaneous Q24H (prophylaxis, 1700)    finasteride  5 mg Oral Daily    melatonin  6 mg Oral Nightly    methocarbamoL  500 mg Oral TID    mirtazapine  7.5 mg Oral QHS    oseltamivir  30 mg Oral Daily    polyethylene glycol  17 g Oral BID    senna-docusate 8.6-50 mg  2 tablet Oral BID    sodium bicarbonate  650 mg Oral Daily    tamsulosin  0.8 mg Oral Daily    zinc sulfate  220 mg Oral Daily       PRN:   bisacodyL, calcium carbonate, hydrALAZINE, HYDROcodone-acetaminophen      Assessment and Plan:    Severe malnutrition  Poor appetite  Body mass index is 19.34 kg/m²., previous weight unknown  RD following  Continue Remeron  Patient reports fair appetite.   Order in place for assist with feedings due to blindness.     Urinary retention  Dysuria  Left renal cyst and Prostatomegaly found on recent CT needs Urology outpatient   Urine culture with staph however previous culture negative, asymptomatic   vitamin C, finasteride  -12/29 Failed void trial, Ramirez now removed with residuals 200-400 mL  - Avoid constipation  -  7 day doxy course for suspected UTI completed 1/6.    - " 1/10 Bladder scan q6h in and out cath only for residual >300ml or patient discomfort. Urology appt 1/16.  Treat rectal constipation seen on KUB  1/11 Gray cath replaced due to patient's severe discomfort  1/12  no complain of pain today  1/16: Urology recs from appt today: keep gray, continue flomax and finasteride. Urology f/up in 3-4 weeks for void trial in clinic. Son or family to be present at next appt, may need to discuss long term otpions if fails trial. Pt to start macrobid 1 wk prior to next appt. Continue to tx chronic constipation.    Constipation  CT- Limited examination. Moderately large liquid stool within the patulous rectum.  Question possible diarrheal illness and/or fecal impaction. Left renal cyst. Prostatomegaly. Osseous changes particularly of the spine.  - Miralax daily, senokot bid  - Encourge oral intake, assist with eating and drinking d/t visual impairment  - Avoid narcotics  -12/29 KUB no constipation  -1/1 Lactulose 30 g x 1   -1/6 + BM (first BM produced without aid of suppository since hospitalization)  1/9: no BM x3 days with rectal discomfort, urinary retention today. Increase senna to 2 tabs BID, miralax to BID. KUB ordered. Discontinue norvasc causes constipation  1/10 suppository daily x3  1/16/2024 see interval hx    Degenerative disc disease  Osteoporosis  Muscle spasms   Continue Robaxin QID PRN, APAP  Norco 5 mg q8h PRN use with caution due to constipation    Hyponatremia  Hypocalcemia  Dehydration  Encourage oral intake  Monitor on routine lab  Calcium with vitamin-D    Metabolic Acidosis  New 1/15: Na 137, CO2 21. Start 650 mg po na bicarb daily x 3 days.  Monitor on routine lab    Right lower extremity Baker's cyst  Noted on venous ultrasound  Continue ibuprofen prn pain, ice  Ambulatory referral to orthopedics    Insomnia  Scheduled melatonin  Remeron 7.5 mg nightly     Weakness  Debility  Patient uses walker at baseline. His son reported  prior to admit he was unable to  get off of toilet and ambulate.    B12 WNL TSH 8.7 but T4 WNL   PT/OT consult   Fall precaution  Lovenox 30mg daily for VTE ppx    Moisture associated dermatitis sacrum   Triad BID   Consult wound care   Vitamin-C and zinc     Primary hypertension  Home Norvasc 5 mg b.i.d.       1/9 Discontinue Norvasc causes constipation. PRN hydralazine for now, will add scheduled medication based on trends  1/12 BP acceptable 170/79, p.r.n. hydralazine available if persistent    Anticipate disposition:    Nursing facility TBD    IP [facility] RISK OF UNPLANNED READMISSION Model: LOW      Follow-up needed during SNF admission:   Urology clinic 1/16    Follow-up needed after discharge from SNF:   - PCP within 1-2 weeks  - See appt scheduled below     Future Appointments   Date Time Provider Department Center   2/6/2024  9:00 AM Vinh Moon MD [facility] [place] I certify that SNF services are required to be given on an inpatient basis because Dimitri Johnson needs for skilled nursing care and/or skilled rehabilitation are required on a daily basis and such services can only practically be provided in a skilled nursing facility setting and are for an ongoing condition for which she received inpatient care in the hospital.       Extended Visit:   Total time spent: 48minutes  Description of Time: counseling provided on clinical condition, therapies provided, plan of care, emotional support, coordinating patient care with other care team members, reviewing and interpreting labs and imaging, collaboration with physician, initiating new orders, chart review, and documentation. See interval hx.       Nunu Suárez NP  Department of Hospital Medicine   [facility]     DOS: 1/16/2024       Patient note was created using MModal Dictation.  Any errors in syntax or even information may not have been identified and edited on initial review prior to signing this note.

## 2024-01-17 PROCEDURE — 25000003 PHARM REV CODE 250: Performed by: INTERNAL MEDICINE

## 2024-01-17 PROCEDURE — 25000003 PHARM REV CODE 250: Performed by: FAMILY MEDICINE

## 2024-01-17 PROCEDURE — 97530 THERAPEUTIC ACTIVITIES: CPT | Mod: CQ

## 2024-01-17 PROCEDURE — 97116 GAIT TRAINING THERAPY: CPT | Mod: CQ

## 2024-01-17 PROCEDURE — 97110 THERAPEUTIC EXERCISES: CPT

## 2024-01-17 PROCEDURE — 25000003 PHARM REV CODE 250: Performed by: HOSPITALIST

## 2024-01-17 PROCEDURE — 11000004 HC SNF PRIVATE

## 2024-01-17 PROCEDURE — 97535 SELF CARE MNGMENT TRAINING: CPT

## 2024-01-17 PROCEDURE — 97110 THERAPEUTIC EXERCISES: CPT | Mod: CQ

## 2024-01-17 PROCEDURE — 63600175 PHARM REV CODE 636 W HCPCS: Performed by: FAMILY MEDICINE

## 2024-01-17 RX ADMIN — ENOXAPARIN SODIUM 30 MG: 30 INJECTION SUBCUTANEOUS at 04:01

## 2024-01-17 RX ADMIN — POLYETHYLENE GLYCOL 3350 17 G: 17 POWDER, FOR SOLUTION ORAL at 10:01

## 2024-01-17 RX ADMIN — SENNOSIDES AND DOCUSATE SODIUM 2 TABLET: 8.6; 5 TABLET ORAL at 09:01

## 2024-01-17 RX ADMIN — MIRTAZAPINE 7.5 MG: 7.5 TABLET, FILM COATED ORAL at 09:01

## 2024-01-17 RX ADMIN — METHOCARBAMOL 500 MG: 500 TABLET ORAL at 09:01

## 2024-01-17 RX ADMIN — Medication 2 TABLET: at 10:01

## 2024-01-17 RX ADMIN — TAMSULOSIN HYDROCHLORIDE 0.8 MG: 0.4 CAPSULE ORAL at 10:01

## 2024-01-17 RX ADMIN — SENNOSIDES AND DOCUSATE SODIUM 2 TABLET: 8.6; 5 TABLET ORAL at 10:01

## 2024-01-17 RX ADMIN — ZINC SULFATE 220 MG (50 MG) CAPSULE 220 MG: CAPSULE at 10:01

## 2024-01-17 RX ADMIN — ASPIRIN 81 MG CHEWABLE TABLET 81 MG: 81 TABLET CHEWABLE at 10:01

## 2024-01-17 RX ADMIN — ACETAMINOPHEN 1000 MG: 500 TABLET ORAL at 09:01

## 2024-01-17 RX ADMIN — SODIUM BICARBONATE 650 MG: 650 TABLET ORAL at 09:01

## 2024-01-17 RX ADMIN — FINASTERIDE 5 MG: 5 TABLET, FILM COATED ORAL at 10:01

## 2024-01-17 RX ADMIN — METHOCARBAMOL 500 MG: 500 TABLET ORAL at 10:01

## 2024-01-17 RX ADMIN — HYDROCODONE BITARTRATE AND ACETAMINOPHEN 1 TABLET: 5; 325 TABLET ORAL at 09:01

## 2024-01-17 RX ADMIN — Medication 6 MG: at 09:01

## 2024-01-17 RX ADMIN — Medication 500 MG: at 09:01

## 2024-01-17 RX ADMIN — POLYETHYLENE GLYCOL 3350 17 G: 17 POWDER, FOR SOLUTION ORAL at 09:01

## 2024-01-17 RX ADMIN — Medication 500 MG: at 10:01

## 2024-01-17 RX ADMIN — OSELTAMIVIR PHOSPHATE 30 MG: 30 CAPSULE ORAL at 10:01

## 2024-01-17 NOTE — PT/OT/SLP PROGRESS
"Occupational Therapy   Treatment    Name: Dimitri Johnson  MRN: [id number]  Admit Date: 12/26/2023  Admitting Diagnosis:  Weakness    General Precautions: Standard, aspiration, vision impaired, fall   Orthopedic Precautions: N/A   Braces: N/A    Recommendations:     Discharge Recommendations:  home health OT  Level of Assistance Recommended at Discharge: 24 hours light assistance for ADL's and homemaking tasks  Discharge Equipment Recommendations: 3-in-1 commode, wheelchair  Barriers to discharge:  Inaccessible home environment, Decreased caregiver support    Assessment:     Dimitri Johnson is a 89 y.o. male with a medical diagnosis of Weakness.   Pt tolerated session well and without incident, but he continues to require assistance to perform self-care tasks and mobility.  He would continue to benefit from skilled OT services at SNF to maximize his gains in functional independence.  He presents with the following.  Performance deficits affecting function are weakness, impaired endurance, impaired self care skills, impaired functional mobility, gait instability, impaired balance, visual deficits, decreased safety awareness, pain.     Rehab Potential is good    Activity tolerance:  Good    Plan:     Patient to be seen 5 x/week to address the above listed problems via self-care/home management, therapeutic activities, therapeutic exercises    Plan of Care Expires: 01/25/24  Plan of Care Reviewed with: patient    Subjective   "Did I go enough?"- BM  Communicated with: nurse prior to session.    Pain/Comfort:  Pain Rating 1: other (see comments) (not rated)  Location - Side 1: Bilateral  Location 1:  ("from ears down back to shoulders")  Pain Addressed 1: Distraction, Reposition  Pain Rating Post-Intervention 1: 0/10    Patient's cultural, spiritual, Faith conflicts given the current situation:  no    Objective:     Patient found  seated on 3-in-1 commode over toilet  with gray catheter with nursing present upon OT entry to " room.    Bed Mobility:    N/A due to pt sitting on 3-in-1 commode over toilet at beginning and end of session and in w/c at end of session    Functional Mobility/Transfers:  Patient completed Sit <> Stand Transfer from 3-in-1 commode over toilet x 1 trial with Min A with grab bar, requiring cueing to keep his RLE from sliding forward and to grasp the grab bar with both hands  Patient completed 3-in-1 over Toilet <> Wheelchair Transfer using Stand Pivot technique with minimum assistance with grab bars(s)    Activities of Daily Living:  Toileting: maximal assistance to perform perineal care and to pull his pants up over his hips while standing at 3-in-1 holding grab bar.  Max A to naseem clean brief.  Pt had a large BM.  Grooming: SBA to wash his hands while seated at sink with cueing to locate the soap dispenser  Feeding: setup assistance of tray table to each a snack while seated in w/c.  He was able to remove the top from the container.      [facility] 6 Click ADL: 16    OT Exercises: UE exercises performed to increase functional endurance and strength in order increase independence when performing self care tasks, functional ambulation, W/C propulsion, and functional standing activities.  He performed UE ergometer for 10 min on moderate resistance.  Pt then performed the following with 3 lb dowel x 1 set each: shoulder flexion x 20 reps, rows x 30 reps, and biceps curls x ~40 reps.     Treatment & Education:  Pt edu on role of OT, POC, safety when performing self care tasks, benefit of performing OOB activity, and safety when performing functional transfers and mobility.    - Self care tasks completed-- as noted above      Patient left up in chair with all lines intact, nursing notified, and call button in reach    GOALS:   Multidisciplinary Problems       Occupational Therapy Goals          Problem: Occupational Therapy    Goal Priority Disciplines Outcome Interventions   Occupational Therapy Goal     OT, PT/OT Ongoing,  Progressing    Description: Goals to be met by: 1/17/2024 (extend to 1/25/2024)    Patient will increase functional independence with ADLs by performing:    UE Dressing with Modified [place]- Ongoing  LE Dressing with Stand-by Assistance-Ongoing  Grooming while seated at sink with Modified [place]- Ongoing  Toileting from toilet with Stand-by Assistance for hygiene and clothing management.   Bathing from sitting/standing at sink with Stand-by Assistance.  Supine to sit with Modified [place].  Step transfer with Stand-by Assistance with RW.  Toilet transfer to toilet with Stand-by Assistance with RW.  Footwear /c Mod A and AE as needed  Tolerate standing functionals tasks for ~5 minutes /c CGA and RW as needed                         Time Tracking:     OT Date of Treatment: 01/17/24  OT Start Time: 1418    OT Stop Time: 1457  OT Total Time (min): 39 min    Billable Minutes:Self Care/Home Management 10 min  Therapeutic Exercise 29 min    1/17/2024

## 2024-01-17 NOTE — PT/OT/SLP PROGRESS
Physical Therapy Treatment    Patient Name:  Dimitri Johnson   MRN:  [id number]  Admit Date: 12/26/2023  Admitting Diagnosis: Weakness  Recent Surgeries: N/A    General Precautions: Standard, fall, vision impaired, aspiration  Orthopedic Precautions: N/A  Braces: N/A    Recommendations:     Discharge Recommendations: home health PT  Level of Assistance Recommended at Discharge: 24 hours light assistance  Discharge Equipment Recommendations: 3-in-1 commode, wheelchair  Barriers to discharge: Decreased caregiver support, Inaccessible home environment    Assessment:     Dimitri Johnson is a 89 y.o. male admitted with a medical diagnosis of Weakness . Pt tolerated well, pt requires less vc and tc throughout session. Pt ambulating with more confidence and completing all functional mobility with SBA/CGA. Pt progressing well and would continue to benefit from skilled PT services to improve overall functional mobility, strength and endurance.      Performance deficits affecting function: weakness, impaired endurance, impaired self care skills, impaired functional mobility, gait instability, impaired balance, decreased upper extremity function, decreased lower extremity function, decreased safety awareness, decreased ROM, impaired cardiopulmonary response to activity.    Rehab Potential is good    Activity Tolerance: Good    Plan:     Patient to be seen 5 x/week to address the above listed problems via gait training, therapeutic activities, therapeutic exercises, neuromuscular re-education, wheelchair management/training    Plan of Care Expires: 01/26/24  Plan of Care Reviewed with: patient    Subjective     Pt agreeable for PT.     Pain/Comfort:  Pain Rating 1: 0/10  Pain Rating Post-Intervention 1: 0/10    Patient's cultural, spiritual, Zoroastrian conflicts given the current situation:  no    Objective:      Patient found HOB elevated with gray catheter upon PT entry to room.     Therapeutic Activities and Exercises: LBE X 10 min  "For BLE strengthening, endurance and ROM    Patient educated on role of therapy, goals of session, and benefits of out of bed mobility.   Instructed on use of call button and importance of calling nursing staff for assistance with mobility   Questions/concerns addressed within PTA scope of practice  Pt verbalized understanding.    Functional Mobility:  Bed Mobility:     Scooting: stand by assistance toward EOB  Supine to Sit: stand by assistance, HOB slightly elevated with rail  Transfers:     Sit to Stand:  contact guard assistance with rolling walker. Vc to scoot to edge and hand placement  Bed to Chair: contact guard assistance with  rolling walker  using  Stand Pivot  Gait: pt amb ~110 ft (including curb) + ~240 ft SBA/CGA with RW. Seated rest break in between trials. Pt less anxious today  Stairs:  Pt ascended/descended 4 stair(s) and 4" curb step with No Assistive Device and Rolling Walker with no handrails with Stand-by Assistance and Contact Guard Assistance. Pt with greater difficulty when descending stairs vs ascending     AM-PAC 6 CLICK MOBILITY  17    Patient left up in chair with call button in reach.    GOALS:   Multidisciplinary Problems       Physical Therapy Goals          Problem: Physical Therapy    Goal Priority Disciplines Outcome Goal Variances Interventions   Physical Therapy Goal     PT, PT/OT Ongoing, Progressing     Description: Goals to be met by: 24     Patient will increase functional independence with mobility by performin. Supine to sit with Supervision - in progress  2. Sit to supine with Supervision - in progress  3. Rolling to Left and Right with Supervision - in progress  4. Sit to stand transfer with Stand-by Assistance - in progress  5. Bed to chair transfer with Stand-by Assistance using Rolling Walker - in progress  6. Gait  x 150 feet with Stand-by Assistance using Rolling Walker - in progress  7. Wheelchair propulsion x 150 feet with Modified [place] using " bilateral upper extremities - in progress  8. New Goal 1/8/24: Ascend/descend 4 inch curb step using RW with SBA - in progress  9. New Goal 1/8/24: Ascend/descend 4 steps using BHR with SBA (progress number of steps as tolerated) - in progress    Rehab Services' DME recommendations    Wheelchair  Number of hours up in a wheelchair per day 8      Style Light weight    Justification for light weight w/c: patient cannot propel in a standard wheelchair, patient can and does self-propel in a lightweight wheelchair, patient has impaired ability to participate in MRADLs, mobility limitations cannot be sifficiently resolved with a cane/walker, the home provides adequate access between rooms for a wheelchair, a wheelchair will significantly improve the ability to participate in MRADLs and will be used in the home on a regular basis, the patient is willing to use a wheelchair in the home, the patient has a caregiver who is available, willing, and able to provide assistance with the wheelchair, and the patient requires additional person for safety with mobility with walker  Seat Width 18 (Standard adult)  Seat Depth 18  Back Height Standard  Leg Support Standard, Heel Loops, and Swing Away  Arm Height Desk and Swing Away  Lap Belt Buckle  Accessories Anti-tippers and Safety belt  Cushion Basic  Justification for Cushion Skin Integrity    Transport Chair    3 in 1 commode Standard     Justification for 3 in 1 commode: The patient's deficits will not be sufficiently addressed by a raised toilet seat                           Time Tracking:     PT Received On: 01/17/24  PT Start Time: 1132  PT Stop Time: 1212  PT Total Time (min): 40 min    Billable Minutes: Gait Training 19, Therapeutic Activity 10, and Therapeutic Exercise 11    Treatment Type: Treatment  PT/PTA: PTA     Number of PTA visits since last PT visit: 1 01/17/2024

## 2024-01-17 NOTE — PLAN OF CARE
Problem: Occupational Therapy  Goal: Occupational Therapy Goal  Description: Goals to be met by: 1/17/2024 (extend to 1/25/2024)    Patient will increase functional independence with ADLs by performing:    UE Dressing with Modified [place]- Ongoing  LE Dressing with Stand-by Assistance-Ongoing  Grooming while seated at sink with Modified [place]- Ongoing  Toileting from toilet with Stand-by Assistance for hygiene and clothing management.   Bathing from sitting/standing at sink with Stand-by Assistance.  Supine to sit with Modified [place].  Step transfer with Stand-by Assistance with RW.  Toilet transfer to toilet with Stand-by Assistance with RW.  Footwear /c Mod A and AE as needed  Tolerate standing functionals tasks for ~5 minutes /c CGA and RW as needed    Outcome: Ongoing, Progressing     OT goals extended.  Continue POC.

## 2024-01-18 LAB
ANION GAP SERPL CALC-SCNC: 9 MMOL/L (ref 8–16)
BASOPHILS # BLD AUTO: 0.03 K/UL (ref 0–0.2)
BASOPHILS NFR BLD: 0.4 % (ref 0–1.9)
BUN SERPL-MCNC: 40 MG/DL (ref 8–23)
CALCIUM SERPL-MCNC: 9 MG/DL (ref 8.7–10.5)
CHLORIDE SERPL-SCNC: 103 MMOL/L (ref 95–110)
CO2 SERPL-SCNC: 24 MMOL/L (ref 23–29)
CREAT SERPL-MCNC: 1.2 MG/DL (ref 0.5–1.4)
DIFFERENTIAL METHOD BLD: ABNORMAL
EOSINOPHIL # BLD AUTO: 0.3 K/UL (ref 0–0.5)
EOSINOPHIL NFR BLD: 4 % (ref 0–8)
ERYTHROCYTE [DISTWIDTH] IN BLOOD BY AUTOMATED COUNT: 13.6 % (ref 11.5–14.5)
EST. GFR  (NO RACE VARIABLE): 57.4 ML/MIN/1.73 M^2
GLUCOSE SERPL-MCNC: 83 MG/DL (ref 70–110)
HCT VFR BLD AUTO: 36.4 % (ref 40–54)
HGB BLD-MCNC: 12 G/DL (ref 14–18)
IMM GRANULOCYTES # BLD AUTO: 0.03 K/UL (ref 0–0.04)
IMM GRANULOCYTES NFR BLD AUTO: 0.4 % (ref 0–0.5)
LYMPHOCYTES # BLD AUTO: 2.4 K/UL (ref 1–4.8)
LYMPHOCYTES NFR BLD: 29.3 % (ref 18–48)
MAGNESIUM SERPL-MCNC: 2.3 MG/DL (ref 1.6–2.6)
MCH RBC QN AUTO: 31.3 PG (ref 27–31)
MCHC RBC AUTO-ENTMCNC: 33 G/DL (ref 32–36)
MCV RBC AUTO: 95 FL (ref 82–98)
MONOCYTES # BLD AUTO: 0.7 K/UL (ref 0.3–1)
MONOCYTES NFR BLD: 8.3 % (ref 4–15)
NEUTROPHILS # BLD AUTO: 4.8 K/UL (ref 1.8–7.7)
NEUTROPHILS NFR BLD: 57.6 % (ref 38–73)
NRBC BLD-RTO: 0 /100 WBC
PHOSPHATE SERPL-MCNC: 3.8 MG/DL (ref 2.7–4.5)
PLATELET # BLD AUTO: 297 K/UL (ref 150–450)
PMV BLD AUTO: 11.3 FL (ref 9.2–12.9)
POTASSIUM SERPL-SCNC: 4.4 MMOL/L (ref 3.5–5.1)
RBC # BLD AUTO: 3.83 M/UL (ref 4.6–6.2)
SODIUM SERPL-SCNC: 136 MMOL/L (ref 136–145)
WBC # BLD AUTO: 8.23 K/UL (ref 3.9–12.7)

## 2024-01-18 PROCEDURE — 25000003 PHARM REV CODE 250: Performed by: FAMILY MEDICINE

## 2024-01-18 PROCEDURE — 63600175 PHARM REV CODE 636 W HCPCS: Performed by: FAMILY MEDICINE

## 2024-01-18 PROCEDURE — 25000003 PHARM REV CODE 250: Performed by: HOSPITALIST

## 2024-01-18 PROCEDURE — 97110 THERAPEUTIC EXERCISES: CPT | Mod: CO

## 2024-01-18 PROCEDURE — 83735 ASSAY OF MAGNESIUM: CPT | Performed by: HOSPITALIST

## 2024-01-18 PROCEDURE — 36415 COLL VENOUS BLD VENIPUNCTURE: CPT | Performed by: HOSPITALIST

## 2024-01-18 PROCEDURE — 25000003 PHARM REV CODE 250: Performed by: INTERNAL MEDICINE

## 2024-01-18 PROCEDURE — 80048 BASIC METABOLIC PNL TOTAL CA: CPT | Performed by: HOSPITALIST

## 2024-01-18 PROCEDURE — 84100 ASSAY OF PHOSPHORUS: CPT | Performed by: HOSPITALIST

## 2024-01-18 PROCEDURE — 85025 COMPLETE CBC W/AUTO DIFF WBC: CPT | Performed by: HOSPITALIST

## 2024-01-18 PROCEDURE — 11000004 HC SNF PRIVATE

## 2024-01-18 PROCEDURE — 97530 THERAPEUTIC ACTIVITIES: CPT | Mod: CO

## 2024-01-18 PROCEDURE — 97116 GAIT TRAINING THERAPY: CPT | Mod: CQ

## 2024-01-18 PROCEDURE — 97530 THERAPEUTIC ACTIVITIES: CPT | Mod: CQ

## 2024-01-18 RX ADMIN — ACETAMINOPHEN 1000 MG: 500 TABLET ORAL at 03:01

## 2024-01-18 RX ADMIN — Medication 2 TABLET: at 10:01

## 2024-01-18 RX ADMIN — ACETAMINOPHEN 1000 MG: 500 TABLET ORAL at 10:01

## 2024-01-18 RX ADMIN — OSELTAMIVIR PHOSPHATE 30 MG: 30 CAPSULE ORAL at 10:01

## 2024-01-18 RX ADMIN — POLYETHYLENE GLYCOL 3350 17 G: 17 POWDER, FOR SOLUTION ORAL at 10:01

## 2024-01-18 RX ADMIN — SENNOSIDES AND DOCUSATE SODIUM 2 TABLET: 8.6; 5 TABLET ORAL at 10:01

## 2024-01-18 RX ADMIN — HYDROCODONE BITARTRATE AND ACETAMINOPHEN 1 TABLET: 5; 325 TABLET ORAL at 10:01

## 2024-01-18 RX ADMIN — MIRTAZAPINE 7.5 MG: 7.5 TABLET, FILM COATED ORAL at 10:01

## 2024-01-18 RX ADMIN — SODIUM BICARBONATE 650 MG: 650 TABLET ORAL at 10:01

## 2024-01-18 RX ADMIN — TAMSULOSIN HYDROCHLORIDE 0.8 MG: 0.4 CAPSULE ORAL at 10:01

## 2024-01-18 RX ADMIN — Medication 500 MG: at 10:01

## 2024-01-18 RX ADMIN — ACETAMINOPHEN 1000 MG: 500 TABLET ORAL at 05:01

## 2024-01-18 RX ADMIN — METHOCARBAMOL 500 MG: 500 TABLET ORAL at 10:01

## 2024-01-18 RX ADMIN — Medication 6 MG: at 10:01

## 2024-01-18 RX ADMIN — ZINC SULFATE 220 MG (50 MG) CAPSULE 220 MG: CAPSULE at 10:01

## 2024-01-18 RX ADMIN — ASPIRIN 81 MG CHEWABLE TABLET 81 MG: 81 TABLET CHEWABLE at 10:01

## 2024-01-18 RX ADMIN — FINASTERIDE 5 MG: 5 TABLET, FILM COATED ORAL at 10:01

## 2024-01-18 RX ADMIN — METHOCARBAMOL 500 MG: 500 TABLET ORAL at 03:01

## 2024-01-18 RX ADMIN — ENOXAPARIN SODIUM 30 MG: 30 INJECTION SUBCUTANEOUS at 05:01

## 2024-01-18 NOTE — PROGRESS NOTES
[facility]                                  Skilled Nursing Facility                   Progress Note     Patient Name: Dimitri Johnson  YOB: 1933  MRN: [id number]  Room: [id number]/[id number] A     Admit Date: 12/26/2023   PALOMO: 1/23/2024     Principal Problem:  Weakness    HPI obtained from patient interview and chart review     Chief Complaint: Re-evaluation of medical treatment and therapy status    HPI:   Dimitri Johnson is a 89 y.o. male admitted to [facility] 12/22 with weakness and constipation. He had associated stomach discomfort that was relieved after having a large bowel movement. Daily miralax and prn dulcolax regimen started. Patient has decreased mobility due to his degenerative disc disease and deconditioning. He is hesitant to walk and stand up. He worked with PT and OT who both recommended moderate intensity therapy.     Patient will be treated at [facility] with PT and OT to improve functional status and ability to perform ADLs.     Interval history:  24 hour chart review completed. Pertinent lab, micro, and/or radiology results addressed below  No complaint of pain since Ramirez catheter replaced.   Vitals: Afebrile, VSS.  I/O: Patient eating well. LBM 01/14.  Will need suppository admin this evening as d/w nursing if no BM during day. Ramirez cath  in place, draining CYU.   Skilled Therapy: Patient progressing with therapy as tolerated and would continue to benefit from skilled PT and OT services to improve overall functional mobility and independence, strength, endurance, and safety.   Family interested in placement, case management to facilitate.    Past Medical History: Patient has a past medical history of Glaucoma and Hypertension. Osteoporosis, macular hole, urinary retention, degenerative  disc disease    Past Surgical History: Patient has a past surgical history that includes Corneal transplant (Right) and Corneal  transplant (Left, 7/17/14).    Social History: Patient reports that he has never smoked. He does not have any smokeless tobacco history on file. He reports current alcohol use.    Family History:  non contributory    Allergies: Patient is allergic to pcn [penicillins].        ROS  Constitutional:  Negative for fever.  Positive forgetfulness  HENT:  Negative for sore throat.    Eyes:  Negative for redness. + limited vision  Respiratory:  Negative for shortness of breath.    Cardiovascular:  Negative for chest pain.   Gastrointestinal:  Negative for nausea. Positive rectal pain  Genitourinary:  Positive urinary frequency and urgency  Musculoskeletal:  Negative for back pain.    Skin:  Negative for rash.   Neurological:  Positive for weakness.   Hematological:  Does not bruise/bleed easily.   Psychiatric/Behavioral: Negative insomnia      24 hour Vital Sign Range   Temp:  [97.7 °F (36.5 °C)-97.8 °F (36.6 °C)]   Pulse:  [69-70]   Resp:  [18]   BP: (127-151)/(62-73)   SpO2:  [95 %-98 %]       Physical Exam  General: Alert, frail, thin, oriented x3 poor short-term memory and recall  HENT: Atraumatic, normocephalic, Right eye keteroplasty/blind, left eye poor vision, hard of hearing  CV: RRR; no murmurs, Normal s1, s2, no peripheral edema  Resp: CTAB with normal work of breathing and chest excursion on room air.   Abd: Soft, NTND. Bowel sounds normoactive. No external hemorrhoids or prolapse visible  MSK: Generalized weakness  :  +Ramirez cath  Neuro: Normal appearing coordination and sensory function.   Skin: No rashes or lesions noted, dry, warm, dressing to left ankle,  dermatitis sacrum  Psych: calm, cooperative, yells out at time         Labs:  Recent Labs   Lab 01/11/24  0441 01/15/24  0643 01/16/24  0411   WBC 10.35 7.68 7.92   HGB 13.5* 12.6* 12.4*   HCT 40.4 38.9* 38.1*    282 286       Recent Labs   Lab 01/11/24  0441 01/15/24  0643    137   K 4.0 4.9    107   CO2 22* 21*   BUN 40* 34*  "  CREATININE 1.2 1.2   GLU 84 82   CALCIUM 9.3 9.2   MG 2.1 2.1   PHOS 3.1 4.0       No results for input(s): "ALKPHOS", "ALT", "AST", "ALBUMIN", "PROT", "BILITOT", "INR" in the last 168 hours.    No results for input(s): "POCTGLUCOSE" in the last 72 hours.    Meds Scheduled:   acetaminophen  1,000 mg Oral Q8H    ascorbic acid (vitamin C)  500 mg Oral BID    aspirin  81 mg Oral Daily    calcium-vitamin D3  2 tablet Oral Daily    enoxparin  30 mg Subcutaneous Q24H (prophylaxis, 1700)    finasteride  5 mg Oral Daily    melatonin  6 mg Oral Nightly    methocarbamoL  500 mg Oral TID    mirtazapine  7.5 mg Oral QHS    oseltamivir  30 mg Oral Daily    polyethylene glycol  17 g Oral BID    senna-docusate 8.6-50 mg  2 tablet Oral BID    sodium bicarbonate  650 mg Oral Daily    tamsulosin  0.8 mg Oral Daily    zinc sulfate  220 mg Oral Daily       PRN:   bisacodyL, calcium carbonate, hydrALAZINE, HYDROcodone-acetaminophen      Assessment and Plan:    Severe malnutrition  Poor appetite  Body mass index is 19.34 kg/m²., previous weight unknown  RD following  Continue Remeron  Patient reports fair appetite.   Order in place for assist with feedings due to blindness.     Urinary retention  Dysuria  Left renal cyst and Prostatomegaly found on recent CT needs Urology outpatient   Urine culture with staph however previous culture negative, asymptomatic   vitamin C, finasteride  -12/29 Failed void trial, Gray now removed with residuals 200-400 mL  - Avoid constipation  -  7 day doxy course for suspected UTI completed 1/6.    - 1/10 Bladder scan q6h in and out cath only for residual >300ml or patient discomfort. Urology appt 1/16.  Treat rectal constipation seen on KUB  1/11 Gray cath replaced due to patient's severe discomfort  1/12  no complain of pain today  1/16: Urology recs from appt today: keep gray, continue flomax and finasteride. Urology f/up in 3-4 weeks for void trial in clinic. Son or family to be present at next " appt, may need to discuss long term otpions if fails trial. Pt to start macrobid 1 wk prior to next appt. Continue to tx chronic constipation.    Constipation  CT- Limited examination. Moderately large liquid stool within the patulous rectum.  Question possible diarrheal illness and/or fecal impaction. Left renal cyst. Prostatomegaly. Osseous changes particularly of the spine.  - Miralax daily, senokot bid  - Encourge oral intake, assist with eating and drinking d/t visual impairment  - Avoid narcotics  -12/29 KUB no constipation  -1/1 Lactulose 30 g x 1   -1/6 + BM (first BM produced without aid of suppository since hospitalization)  1/9: no BM x3 days with rectal discomfort, urinary retention today. Increase senna to 2 tabs BID, miralax to BID. KUB ordered. Discontinue norvasc causes constipation  1/10 suppository daily x3  1/17/2024 see interval hx    Degenerative disc disease  Osteoporosis  Muscle spasms   Continue Robaxin QID PRN, APAP  Norco 5 mg q8h PRN use with caution due to constipation    Hyponatremia  Hypocalcemia  Dehydration  Encourage oral intake  Monitor on routine lab  Calcium with vitamin-D    Metabolic Acidosis  New 1/15: Na 137, CO2 21. Start 650 mg po na bicarb daily x 3 days.  Monitor on routine lab    Right lower extremity Baker's cyst  Noted on venous ultrasound  Continue ibuprofen prn pain, ice  Ambulatory referral to orthopedics    Insomnia  Scheduled melatonin  Remeron 7.5 mg nightly     Weakness  Debility  Patient uses walker at baseline. His son reported  prior to admit he was unable to get off of toilet and ambulate.    B12 WNL TSH 8.7 but T4 WNL   PT/OT consult   Fall precaution  Lovenox 30mg daily for VTE ppx    Moisture associated dermatitis sacrum   Triad BID   Consult wound care   Vitamin-C and zinc     Primary hypertension  Home Norvasc 5 mg b.i.d.       1/9 Discontinue Norvasc causes constipation. PRN hydralazine for now, will add scheduled medication based on trends  1/12 BP  acceptable 170/79, p.r.n. hydralazine available if persistent    Anticipate disposition:    Nursing facility TBD    IP [facility] RISK OF UNPLANNED READMISSION Model: LOW      Follow-up needed during SNF admission:   Urology clinic 1/16    Follow-up needed after discharge from SNF:   - PCP within 1-2 weeks  - See appt scheduled below     Future Appointments   Date Time Provider Department Center   2/6/2024  9:00 AM Vinh Moon MD [facility] UROLOGY [facility]         I certify that SNF services are required to be given on an inpatient basis because Dimitri Johnson needs for skilled nursing care and/or skilled rehabilitation are required on a daily basis and such services can only practically be provided in a skilled nursing facility setting and are for an ongoing condition for which she received inpatient care in the hospital.       Extended Visit:   Total time spent: 31 minutes  Description of Time: counseling provided on clinical condition, therapies provided, plan of care, emotional support, coordinating patient care with other care team members, reviewing and interpreting labs and imaging, collaboration with physician, initiating new orders, chart review, and documentation. See interval hx.       Nunu Suárez NP  Department of Hospital Medicine   [facility]     DOS: 1/17/2024       Patient note was created using MModal Dictation.  Any errors in syntax or even information may not have been identified and edited on initial review prior to signing this note.

## 2024-01-18 NOTE — NURSING
Patient refused breakfast this morning. Patient stated to nurse and PTA that he has no interest in doing anything today or any other day. Nurse educated patient on importance of eating and the need for therapy. Patient verbalized understanding and still declined.

## 2024-01-18 NOTE — CARE UPDATE
SS returned  Lucille's call back at [phone] and left a message for her.   Asked her to call SS earlier in the day due to 1-3 meeting tomorrow.    1/18/2024    9:50 am called Lucille again, appears to be hung up on x2 on her cell given above  11:11 am called Tanner, left message for at length with each facility and the non responses I have received from them thus far; if you call back, please leave a detailed message

## 2024-01-18 NOTE — PLAN OF CARE
Severe malnutrition  Malnutrition Type:  Context:  social/environmental  Level:  severe     Related to (etiology):   Inadequate oral  intake,      Signs and Symptoms (as evidenced by):   BMI 18      Malnutrition Characteristic Summary:   5% weight loss in one month  Severe fat loss  Moderate to severe muscle loss        Interventions/Recommendations (treatment strategy):  Collaboration with other providers  General diet, ice cream with lunch and dinner, double meat in am  Vitamin supplement therapy- Vit D, Vit C  Mineral supplement therapy- zinc, Calcium  Prescription medication- mirtazapine     Nutrition Diagnosis Status: Active

## 2024-01-18 NOTE — PLAN OF CARE
CHW called Tanner/son @ [phone] to serve per [employer]. Tanner stated his dad doesn't have any facility to go to as of now. CHW explained to son that he can appeal d/c if he needs too. Tanner said he will talk to his sister Lucille to make a decision. CHW informed Tanner a copy of [facility] will be left at patient's bedside.

## 2024-01-18 NOTE — PT/OT/SLP PROGRESS
"Occupational Therapy   Treatment    Name: Dimitri Johnson  MRN: [id number]  Admit Date: 12/26/2023  Admitting Diagnosis:  Weakness    General Precautions: Standard, aspiration, vision impaired, fall   Orthopedic Precautions: N/A   Braces: N/A    Recommendations:     Discharge Recommendations:  home health OT  Level of Assistance Recommended at Discharge: 24 hours light assistance for ADL's and homemaking tasks  Discharge Equipment Recommendations: 3-in-1 commode, wheelchair  Barriers to discharge:  Inaccessible home environment, Decreased caregiver support    Assessment:     Dimitri Johnson is a 89 y.o. male with a medical diagnosis of Weakness.  He presents with limitations in performance of self-care, functional mobility, and ADLs. Performance deficits affecting function are weakness, impaired endurance, impaired self care skills, impaired functional mobility, gait instability, impaired balance, visual deficits, decreased safety awareness, pain. Pt with increased encouragement to agree to Tx. Once in therapy gym, pt was motivated to participate. Pt tolerated Tx without incident and is making progress but continues to require assist to perform self care tasks, functional mobility and functional transfers. Pt would continue to benefit from OT intervention to further functional (I)ce and safety.     Rehab Potential is good    Activity tolerance:  Good    Plan:     Patient to be seen 5 x/week to address the above listed problems via self-care/home management, therapeutic activities, therapeutic exercises    Plan of Care Expires: 01/25/24  Plan of Care Reviewed with: patient    Subjective     "I don't feel like I can do anything. I don't even want to try." "I just feel depressed because my memory is so bad." Nursing aware    Communicated with: Nursing prior to session.     Pain/Comfort:  Pain Rating 1: 0/10  Pain Rating Post-Intervention 1: 0/10    Patient's cultural, spiritual, Restoration conflicts given the current " situation:  no    Objective:     Patient found  up in bedside chair  with gray catheter upon OT entry to room.    Bed Mobility:    Pt seated in chair at onset of treatment session.     Functional Mobility/Transfers:  Patient completed Sit <> Stand Transfer with minimum assistance from wc and moderate assistance from bedside chair with  rolling walker with v/c for technique  Patient completed Bedside chair <> Chair Transfer using Step Transfer technique with contact guard assistance with rolling walker  Functional Mobility: Pt ambulated functional distance in room with CGA and RW    AMPAC 6 Click ADL: 16    OT Exercises: Pt performed UBE exercise for 12 minutes with Mod resistance.  UE exercises performed to increase functional endurance and strength in order increase independence when performing self care tasks, functional ambulation, W/C propulsion, and functional standing activities .      Treatment & Education:  Pt participated in standing activity with SBA and RW. Pt at raised counter to perform fine motor and visual scanning activity with focus on standing tolerance, functional reaching, dynamic standing bal, crossing midline, and to promote independence with homemaking and self care tasks. Pt tolerated standing for 12 min     Pt educated on:  - role of OT  - level of assistance  - energy conservation and task modification to maximized independence with ADL's and mobility   -  safety while performing functional transfers and self care tasks  - progress towards OT goals    Patient left up in chair with  PTA present    GOALS:   Multidisciplinary Problems       Occupational Therapy Goals          Problem: Occupational Therapy    Goal Priority Disciplines Outcome Interventions   Occupational Therapy Goal     OT, PT/OT Ongoing, Progressing    Description: Goals to be met by: 1/17/2024 (extend to 1/25/2024)    Patient will increase functional independence with ADLs by performing:    UE Dressing with Modified  Yarnell- Ongoing  LE Dressing with Stand-by Assistance-Ongoing  Grooming while seated at sink with Modified Yarnell- Ongoing  Toileting from toilet with Stand-by Assistance for hygiene and clothing management.   Bathing from sitting/standing at sink with Stand-by Assistance.  Supine to sit with Modified Yarnell.  Step transfer with Stand-by Assistance with RW.  Toilet transfer to toilet with Stand-by Assistance with RW.  Footwear /c Mod A and AE as needed  Tolerate standing functionals tasks for ~5 minutes /c CGA and RW as needed -Met                         Time Tracking:     OT Date of Treatment: 01/18/24  OT Start Time: 1350    OT Stop Time: 1432  OT Total Time (min): 42 min    Billable Minutes:Therapeutic Activity 27  Therapeutic Exercise 15    1/18/2024

## 2024-01-18 NOTE — PT/OT/SLP PROGRESS
"Physical Therapy Treatment    Patient Name:  Dimitri Johnson   MRN:  [id number]  Admit Date: 12/26/2023  Admitting Diagnosis: Weakness  Recent Surgeries: N/A    General Precautions: Standard, aspiration, vision impaired, fall  Orthopedic Precautions: N/A  Braces: N/A    Recommendations:     Discharge Recommendations: home health PT  Level of Assistance Recommended at Discharge: 24 hours light assistance  Discharge Equipment Recommendations: 3-in-1 commode, wheelchair  Barriers to discharge: Decreased caregiver support, Inaccessible home environment    Assessment:     Dimitri Johnson is a 89 y.o. male admitted with a medical diagnosis of Weakness . Pt tolerated well, pt amb x 2 trials with SBA, Asc/desc stairs and curb with SBA/CGA. Pt propelled wheelchair without issue and LBE to tolerance d/t R knee pain. Pt progressing well, more confident when ambulating/ standing and would continue to benefit from skilled PT services to improve overall functional mobility, strength and endurance.      Performance deficits affecting function: weakness, impaired endurance, impaired self care skills, impaired functional mobility, gait instability, impaired balance, decreased upper extremity function, decreased lower extremity function, decreased safety awareness, decreased ROM, impaired cardiopulmonary response to activity.    Rehab Potential is good    Activity Tolerance: Good    Plan:     Patient to be seen 5 x/week to address the above listed problems via gait training, therapeutic activities, therapeutic exercises, neuromuscular re-education, wheelchair management/training    Plan of Care Expires: 01/26/24  Plan of Care Reviewed with: patient    Subjective     "Can we stop this, its hurting my Right knee".     Pain/Comfort:  Pain Rating 1: 0/10  Location - Side 1: Right  Location - Orientation 1: generalized  Location 1: knee  Pain Addressed 1: Reposition, Distraction, Cessation of Activity  Pain Rating Post-Intervention 1:  (not " "rated)    Patient's cultural, spiritual, Taoist conflicts given the current situation:  no    Objective:     Patient found up in chair with gray catheter upon handoff from OT     Therapeutic Activities and Exercises: LBE X ~3 min For BLE strengthening, endurance and ROM. Request to stop due to R knee pain    Patient educated on role of therapy, goals of session, and benefits of out of bed mobility.   Instructed on use of call button and importance of calling nursing staff for assistance with mobility   Questions/concerns addressed within PTA scope of practice  Pt verbalized understanding    Functional Mobility:  Transfers:     Sit to Stand:  stand by assistance with rolling walker. Vc to scoot to edge of chair and hand placement/ technique  Gait: pt amb 165 ft + ~120 ft SBA/CGA with RW. Seated rest break in between trials   Stairs:  Pt ascended/descended 4 stair(s) with No Assistive Device with bilateral handrails with Stand-by Assistance and Contact Guard Assistance. Asc SBA and descend CGA  4" curb: pt asc/desc SBA/CGA with RW. Vc for sequencing   Wheelchair Propulsion:  Pt propelled Light weight wheelchair x 150 feet on Level tile with  Bilateral upper extremity with Supervision or Set-up Assistance.     AM-PAC 6 CLICK MOBILITY  17    Patient left up in chair with  daughter present.    GOALS:   Multidisciplinary Problems       Physical Therapy Goals          Problem: Physical Therapy    Goal Priority Disciplines Outcome Goal Variances Interventions   Physical Therapy Goal     PT, PT/OT Ongoing, Progressing     Description: Goals to be met by: 24     Patient will increase functional independence with mobility by performin. Supine to sit with Supervision - in progress  2. Sit to supine with Supervision - in progress  3. Rolling to Left and Right with Supervision - in progress  4. Sit to stand transfer with Stand-by Assistance - in progress  5. Bed to chair transfer with Stand-by Assistance using " Rolling Walker - in progress  6. Gait  x 150 feet with Stand-by Assistance using Rolling Walker - in progress  7. Wheelchair propulsion x 150 feet with Modified Reagan using bilateral upper extremities - in progress  8. New Goal 1/8/24: Ascend/descend 4 inch curb step using RW with SBA - in progress  9. New Goal 1/8/24: Ascend/descend 4 steps using BHR with SBA (progress number of steps as tolerated) - in progress    Rehab Services' DME recommendations    Wheelchair  Number of hours up in a wheelchair per day 8      Style Light weight    Justification for light weight w/c: patient cannot propel in a standard wheelchair, patient can and does self-propel in a lightweight wheelchair, patient has impaired ability to participate in MRADLs, mobility limitations cannot be sifficiently resolved with a cane/walker, the home provides adequate access between rooms for a wheelchair, a wheelchair will significantly improve the ability to participate in MRADLs and will be used in the home on a regular basis, the patient is willing to use a wheelchair in the home, the patient has a caregiver who is available, willing, and able to provide assistance with the wheelchair, and the patient requires additional person for safety with mobility with walker  Seat Width 18 (Standard adult)  Seat Depth 18  Back Height Standard  Leg Support Standard, Heel Loops, and Swing Away  Arm Height Desk and Swing Away  Lap Belt Buckle  Accessories Anti-tippers and Safety belt  Cushion Basic  Justification for Cushion Skin Integrity    Transport Chair    3 in 1 commode Standard     Justification for 3 in 1 commode: The patient's deficits will not be sufficiently addressed by a raised toilet seat                           Time Tracking:     PT Received On: 01/18/24  PT Start Time: 1439  PT Stop Time: 1518  PT Total Time (min): 39 min    Billable Minutes: Gait Training 26 and Therapeutic Activity 13    Treatment Type: Treatment  PT/PTA: PTA      Number of PTA visits since last PT visit: 2     01/18/2024

## 2024-01-18 NOTE — PROGRESS NOTES
[facility] - Skilled Nursing  Adult Nutrition  Progress Note    SUMMARY   Recommendations  Continue regular diet, encourage PO intake, add double meat in am,RD following  Goals: PO to meet 75% of EEN/EPN with ONS by next RD follow up  Nutrition Goal Status: progressing towards goal  Communication of RD Recs: other (comment) (POC)    Assessment and Plan   Severe malnutrition  Malnutrition Type:  Context:  social/environmental  Level:  severe     Related to (etiology):   Inadequate oral  intake,      Signs and Symptoms (as evidenced by):   BMI 18      Malnutrition Characteristic Summary:   5% weight loss in one month  Severe fat loss  Moderate to severe muscle loss        Interventions/Recommendations (treatment strategy):  Collaboration with other providers  General diet, ice cream with lunch and dinner, double meat in am  Vitamin supplement therapy- Vit D, Vit C  Mineral supplement therapy- zinc, Calcium  Prescription medication- mirtazapine     Nutrition Diagnosis Status: Active    Malnutrition Assessment 12/29,1/18     Skin (Micronutrient): none, bruised, dry, pallor  Neck/Chest (Micronutrient): muscle wasting, bony prominence, subcutaneous fat loss  Musculoskeletal/Lower Extremities: subcutaneous fat loss, muscle control poor, muscle wasting           Orbital Region (Subcutaneous Fat Loss): severe depletion  Upper Arm Region (Subcutaneous Fat Loss): severe depletion  Thoracic and Lumbar Region: severe depletion   [place] Region (Muscle Loss): moderate depletion  Clavicle Bone Region (Muscle Loss): moderate depletion  Clavicle and Acromion Bone Region (Muscle Loss): severe depletion  Scapular Bone Region (Muscle Loss): severe depletion  Dorsal Hand (Muscle Loss): severe depletion  Patellar Region (Muscle Loss): moderate depletion  Anterior Thigh Region (Muscle Loss): moderate depletion  Posterior Calf Region (Muscle Loss): moderate depletion                 Reason for Assessment    Reason For Assessment: RD  "follow-up  Diagnosis:  (wekaness, constipation)  Relevant Medical History: HTN, deconditioning  Interdisciplinary Rounds: attended  General Information Comments: pt to dc to long term care  Nutrition Discharge Planning: DC on regular diet, ONS of choice for weight gain    Nutrition/Diet History    Patient Reported Diet/Restrictions/Preferences: general  Spiritual, Cultural Beliefs, Confucianist Practices, Values that Affect Care: no  Food Allergies: NKFA  Factors Affecting Nutritional Intake: decreased appetite, constipation    Anthropometrics    Temp: 98.2 °F (36.8 °C)  Height Method: Stated  Height: 5' 11" (180.3 cm)  Height (inches): 71 in  Weight Method: Bed Scale  Weight: 62.9 kg (138 lb 10.7 oz)  Weight (lb): 138.67 lb  Ideal Body Weight (IBW), Male: 172 lb  % Ideal Body Weight, Male (lb): 75.37 %  BMI (Calculated): 19.3  BMI Grade: 18.5-24.9 - normal  Usual Body Weight (UBW), k.6 kg  % Usual Body Weight: 95.65  % Weight Change From Usual Weight: -4.55 %       Lab/Procedures/Meds    Pertinent Labs Reviewed: reviewed  Pertinent Labs Comments: Hg 12.0, Hct 36.4, BUN  40, GFR  57.4,  Pertinent Medications Reviewed: reviewed  Pertinent Medications Comments: senna-docusate, polyethlyene glycol    Estimated/Assessed Needs    Weight Used For Calorie Calculations: 61.6 kg (135 lb 12.9 oz) (UBW)  Energy Calorie Requirements (kcal): 1861-8298  Energy Need Method: Kcal/kg (25-30 kcal/kg for weight gain)  Protein Requirements: 80g  Weight Used For Protein Calculations: 61.6 kg (135 lb 12.9 oz) (UBW x 1.3)  Fluid Requirements (mL): 1540 or per MD  Estimated Fluid Requirement Method: RDA Method  RDA Method (mL): 1540  CHO Requirement: -      Nutrition Prescription Ordered    Current Diet Order: Regular  Nutrition Order Comments: PO 75%  Oral Nutrition Supplement: dc refuses    Evaluation of Received Nutrient/Fluid Intake    I/O: no data  Energy Calories Required: meeting needs  Protein Required: not meeting needs  Fluid " Required: meeting needs  Comments: LBM 1/17  Tolerance: tolerating  % Intake of Estimated Energy Needs: 75 - 100 %  % Meal Intake: 75 - 100 %    Nutrition Risk    Level of Risk/Frequency of Follow-up: low (one time per week)     Monitor and Evaluation    Food and Nutrient Intake: food and beverage intake  Food and Nutrient Adminstration: diet order  Physical Activity and Function: nutrition-related ADLs and IADLs  Anthropometric Measurements: weight change  Biochemical Data, Medical Tests and Procedures: electrolyte and renal panel, gastrointestinal profile  Nutrition-Focused Physical Findings: overall appearance     Nutrition Follow-Up    RD Follow-up?: Yes

## 2024-01-18 NOTE — PROGRESS NOTES
[facility]                                  Skilled Nursing Facility                   Progress Note     Patient Name: Dimitri Johnson  YOB: 1933  MRN: [id number]  Room: [id number] A     Admit Date: 12/26/2023   PALOMO: 1/23/2024     Principal Problem:  Weakness    HPI obtained from patient interview and chart review     Chief Complaint: Re-evaluation of medical treatment and therapy status, memory loss, lab review    HPI:   Dimitri Johnson is a 89 y.o. male admitted to [facility] 12/22 with weakness and constipation. He had associated stomach discomfort that was relieved after having a large bowel movement. Daily miralax and prn dulcolax regimen started. Patient has decreased mobility due to his degenerative disc disease and deconditioning. He is hesitant to walk and stand up. He worked with PT and OT who both recommended moderate intensity therapy.     Patient will be treated at [facility] with PT and OT to improve functional status and ability to perform ADLs.     Interval history:  24 hour chart review completed. Pertinent lab, micro, and/or radiology results addressed below  Patient verbalizing concern regarding memory loss. Consult placed to SLP for cognitive eval and tx.  Vitals: Afebrile, VSS.  I/O: Patient eating well. Encourage fluids as BUN is elevated. LBM 01/17. Ramirez cath draining CYU.   Skilled Therapy: Patient progressing with therapy as tolerated and would continue to benefit from skilled PT and OT services to improve overall functional mobility and independence, strength, endurance, and safety.   Family considering [facility] placement at discharge, case management to facilitate.    Past Medical History: Patient has a past medical history of Glaucoma and Hypertension. 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    Social History: Patient reports that he has never smoked. He does not have any smokeless tobacco history on file. He reports current alcohol use.    Family History:  non contributory    Allergies: Patient is allergic to pcn [penicillins].        ROS  Constitutional:  Negative for fever.  Positive forgetfulness  HENT:  Negative for sore throat.    Eyes:  Negative for redness. + limited vision  Respiratory:  Negative for shortness of breath.    Cardiovascular:  Negative for chest pain.   Gastrointestinal:  Negative for nausea. Positive rectal pain  Genitourinary:  Positive urinary frequency and urgency  Musculoskeletal:  Negative for back pain.    Skin:  Negative for rash.   Neurological:  Positive for weakness.   Hematological:  Does not bruise/bleed easily.   Psychiatric/Behavioral: Negative insomnia      24 hour Vital Sign Range   Temp:  [97.6 °F (36.4 °C)-98.2 °F (36.8 °C)]   Pulse:  [78-81]   Resp:  [17-18]   BP: (130-135)/(58-66)   SpO2:  [97 %]       Physical Exam  General: Alert, frail, thin, oriented x3 poor short-term memory and recall  HENT: Atraumatic, normocephalic, Right eye keteroplasty/blind, left eye poor vision, hard of hearing  CV: RRR; no murmurs, Normal s1, s2, no peripheral edema  Resp: CTAB with normal work of breathing and chest excursion on room air.   Abd: Soft, NTND. Bowel sounds normoactive. No external hemorrhoids or prolapse visible  MSK: Generalized weakness  :  +Ramirez cath  Neuro: Normal appearing coordination and sensory function.   Skin: No rashes or lesions noted, dry, warm, dressing to left ankle,  dermatitis sacrum  Psych: calm, cooperative, yells out at time         Labs:  Recent Labs   Lab 01/15/24  0643 01/16/24  0411 01/18/24  0414   WBC 7.68 7.92 8.23   HGB 12.6* 12.4* 12.0*   HCT 38.9* 38.1* 36.4*    286 297       Recent Labs   Lab 01/15/24  0643 01/18/24 0414    136   K 4.9 4.4    103  "  CO2 21* 24   BUN 34* 40*   CREATININE 1.2 1.2   GLU 82 83   CALCIUM 9.2 9.0   MG 2.1 2.3   PHOS 4.0 3.8       No results for input(s): "ALKPHOS", "ALT", "AST", "ALBUMIN", "PROT", "BILITOT", "INR" in the last 168 hours.    No results for input(s): "POCTGLUCOSE" in the last 72 hours.    Meds Scheduled:   acetaminophen  1,000 mg Oral Q8H    ascorbic acid (vitamin C)  500 mg Oral BID    aspirin  81 mg Oral Daily    calcium-vitamin D3  2 tablet Oral Daily    enoxparin  30 mg Subcutaneous Q24H (prophylaxis, 1700)    finasteride  5 mg Oral Daily    melatonin  6 mg Oral Nightly    methocarbamoL  500 mg Oral TID    mirtazapine  7.5 mg Oral QHS    oseltamivir  30 mg Oral Daily    polyethylene glycol  17 g Oral BID    senna-docusate 8.6-50 mg  2 tablet Oral BID    tamsulosin  0.8 mg Oral Daily    zinc sulfate  220 mg Oral Daily       PRN:   bisacodyL, calcium carbonate, hydrALAZINE, HYDROcodone-acetaminophen      Assessment and Plan:    Severe malnutrition  Poor appetite  Body mass index is 19.34 kg/m²., previous weight unknown  RD following  Continue Remeron  Patient reports fair appetite.   Order in place for assist with feedings due to blindness.     Urinary retention  Dysuria  Left renal cyst and Prostatomegaly found on recent CT needs Urology outpatient   Urine culture with staph however previous culture negative, asymptomatic   vitamin C, finasteride  -12/29 Failed void trial, Gray now removed with residuals 200-400 mL  - Avoid constipation  -  7 day doxy course for suspected UTI completed 1/6.    - 1/10 Bladder scan q6h in and out cath only for residual >300ml or patient discomfort. Urology appt 1/16.  Treat rectal constipation seen on KUB  1/11 Gray cath replaced due to patient's severe discomfort  1/12  no complain of pain today  1/16: Urology recs from appt today: keep gray, continue flomax and finasteride. Urology f/up in 3-4 weeks for void trial in clinic. Son or family to be present at next appt, may need to " discuss long term otpions if fails trial. Pt to start macrobid 1 wk prior to next appt. Continue to tx chronic constipation.    Constipation  CT- Limited examination. Moderately large liquid stool within the patulous rectum.  Question possible diarrheal illness and/or fecal impaction. Left renal cyst. Prostatomegaly. Osseous changes particularly of the spine.  - Miralax daily, senokot bid  - Encourge oral intake, assist with eating and drinking d/t visual impairment  - Avoid narcotics  -12/29 KUB no constipation  -1/1 Lactulose 30 g x 1   -1/6 + BM (first BM produced without aid of suppository since hospitalization)  1/9: no BM x3 days with rectal discomfort, urinary retention today. Increase senna to 2 tabs BID, miralax to BID. KUB ordered. Discontinue norvasc causes constipation  1/10 suppository daily x3  1/18/2024 see interval hx    Degenerative disc disease  Osteoporosis  Muscle spasms   Continue Robaxin QID PRN, APAP  Norco 5 mg q8h PRN use with caution due to constipation    Hyponatremia  Hypocalcemia  Dehydration  Encourage oral intake  Monitor on routine lab  Calcium with vitamin-D    Metabolic Acidosis  New 1/15: Na 137, CO2 21. Start 650 mg po na bicarb daily x 3 days.  Monitor on routine lab    Right lower extremity Baker's cyst  Noted on venous ultrasound  Continue ibuprofen prn pain, ice  Ambulatory referral to orthopedics    Insomnia  Scheduled melatonin  Remeron 7.5 mg nightly     Weakness  Debility  Patient uses walker at baseline. His son reported  prior to admit he was unable to get off of toilet and ambulate.    B12 WNL TSH 8.7 but T4 WNL   PT/OT consult   Fall precaution  Lovenox 30mg daily for VTE ppx    Moisture associated dermatitis sacrum   Triad BID   Consult wound care   Vitamin-C and zinc     Primary hypertension  Home Norvasc 5 mg b.i.d.       1/9 Discontinue Norvasc causes constipation. PRN hydralazine for now, will add scheduled medication based on trends  1/12 BP acceptable 170/79,  p.r.n. hydralazine available if persistent    Anticipate disposition:    Nursing facility TBD    IP [facility] RISK OF UNPLANNED READMISSION Model: LOW      Follow-up needed during SNF admission:   Urology clinic 1/16    Follow-up needed after discharge from SNF:   - PCP within 1-2 weeks  - See appt scheduled below     Future Appointments   Date Time Provider Department Center   2/6/2024  9:00 AM Vinh Moon MD [facility] UROLOGY [facility] [place] I certify that SNF services are required to be given on an inpatient basis because Dimitri Johnson needs for skilled nursing care and/or skilled rehabilitation are required on a daily basis and such services can only practically be provided in a skilled nursing facility setting and are for an ongoing condition for which she received inpatient care in the hospital.       Extended Visit:   Total time spent: 47 minutes  Description of Time: counseling provided on clinical condition, therapies provided, plan of care, emotional support, coordinating patient care with other care team members, reviewing and interpreting labs and imaging, collaboration with physician, initiating new orders, chart review, and documentation. See interval hx.       Nunu Suárez NP  Department of Hospital Medicine   [facility]- Skilled Nursing Facility     DOS: 1/18/2024       Patient note was created using MModal Dictation.  Any errors in syntax or even information may not have been identified and edited on initial review prior to signing this note.

## 2024-01-19 PROCEDURE — 92523 SPEECH SOUND LANG COMPREHEN: CPT

## 2024-01-19 PROCEDURE — 63600175 PHARM REV CODE 636 W HCPCS: Performed by: FAMILY MEDICINE

## 2024-01-19 PROCEDURE — 25000003 PHARM REV CODE 250: Performed by: FAMILY MEDICINE

## 2024-01-19 PROCEDURE — 25000003 PHARM REV CODE 250: Performed by: HOSPITALIST

## 2024-01-19 PROCEDURE — 97116 GAIT TRAINING THERAPY: CPT | Mod: CQ

## 2024-01-19 PROCEDURE — 11000004 HC SNF PRIVATE

## 2024-01-19 PROCEDURE — 97110 THERAPEUTIC EXERCISES: CPT | Mod: CO

## 2024-01-19 PROCEDURE — 97535 SELF CARE MNGMENT TRAINING: CPT

## 2024-01-19 PROCEDURE — 97110 THERAPEUTIC EXERCISES: CPT | Mod: CQ

## 2024-01-19 PROCEDURE — 25000003 PHARM REV CODE 250: Performed by: INTERNAL MEDICINE

## 2024-01-19 PROCEDURE — 97530 THERAPEUTIC ACTIVITIES: CPT | Mod: CO

## 2024-01-19 RX ADMIN — POLYETHYLENE GLYCOL 3350 17 G: 17 POWDER, FOR SOLUTION ORAL at 08:01

## 2024-01-19 RX ADMIN — METHOCARBAMOL 500 MG: 500 TABLET ORAL at 02:01

## 2024-01-19 RX ADMIN — METHOCARBAMOL 500 MG: 500 TABLET ORAL at 08:01

## 2024-01-19 RX ADMIN — SENNOSIDES AND DOCUSATE SODIUM 2 TABLET: 8.6; 5 TABLET ORAL at 08:01

## 2024-01-19 RX ADMIN — Medication 500 MG: at 08:01

## 2024-01-19 RX ADMIN — Medication 2 TABLET: at 08:01

## 2024-01-19 RX ADMIN — ENOXAPARIN SODIUM 30 MG: 30 INJECTION SUBCUTANEOUS at 05:01

## 2024-01-19 RX ADMIN — TAMSULOSIN HYDROCHLORIDE 0.8 MG: 0.4 CAPSULE ORAL at 08:01

## 2024-01-19 RX ADMIN — ACETAMINOPHEN 1000 MG: 500 TABLET ORAL at 09:01

## 2024-01-19 RX ADMIN — MIRTAZAPINE 7.5 MG: 7.5 TABLET, FILM COATED ORAL at 08:01

## 2024-01-19 RX ADMIN — ACETAMINOPHEN 1000 MG: 500 TABLET ORAL at 01:01

## 2024-01-19 RX ADMIN — FINASTERIDE 5 MG: 5 TABLET, FILM COATED ORAL at 08:01

## 2024-01-19 RX ADMIN — OSELTAMIVIR PHOSPHATE 30 MG: 30 CAPSULE ORAL at 08:01

## 2024-01-19 RX ADMIN — ASPIRIN 81 MG CHEWABLE TABLET 81 MG: 81 TABLET CHEWABLE at 08:01

## 2024-01-19 RX ADMIN — Medication 6 MG: at 08:01

## 2024-01-19 RX ADMIN — Medication 500 MG: at 09:01

## 2024-01-19 RX ADMIN — ZINC SULFATE 220 MG (50 MG) CAPSULE 220 MG: CAPSULE at 08:01

## 2024-01-19 NOTE — CARE UPDATE
Discharge follow ups    Discussed with Tanner what has been happening at this time, informed him of phone calls to all facilities below, he too was contact calling in follow up      [facility]-  Cameron-private pay-Cameron intends to call family before 1 pm today! Semi private or private    [facility]-  Geraldine -private pay pt-left (another ) message       [facility], ([facility])- filled out online request for info, faxed referral to [phone]  Fax email says went through  [facility] changed LTC placement, private pay, will call back with update

## 2024-01-19 NOTE — PROGRESS NOTES
[facility]                                  Skilled Nursing Facility                   Progress Note     Patient Name: Dimitri Johnson  YOB: 1933  MRN: [id number]  Room: [id number] A     Admit Date: 12/26/2023   PALOMO: 1/23/2024     Principal Problem:  Weakness    HPI obtained from patient interview and chart review     Chief Complaint: Re-evaluation of medical treatment and therapy status, memory loss, HTN    HPI:   Dimitri Johnson is a 89 y.o. male admitted to [facility] 12/22 with weakness and constipation. He had associated stomach discomfort that was relieved after having a large bowel movement. Daily miralax and prn dulcolax regimen started. Patient has decreased mobility due to his degenerative disc disease and deconditioning. He is hesitant to walk and stand up. He worked with PT and OT who both recommended moderate intensity therapy.     Patient will be treated at [facility] with PT and OT to improve functional status and ability to perform ADLs.     Interval history:  24 hour chart review completed. Pertinent lab, micro, and/or radiology results addressed below  SLP initial eval completed today. SLP will follow patient for cognition in SNF as it pertains to mild cognitive impairments.  Vitals: Afebrile, VSS. HTN intermittently hovering at upper limits of goal. Monitor  I/O: Patient eating well. Encourage fluids given elevated BUN. LBM 01/17. Ramirez cath draining CYU.   Skilled Therapy: Patient progressing with therapy as tolerated and would continue to benefit from skilled PT and OT services to improve overall functional mobility and independence, strength, endurance, and safety.   nursing home placement at discharge, anticipating [facility]. Case management facilitating.    Past Medical History: Patient has a past medical history of Glaucoma and Hypertension. 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    Social History: Patient reports that he has never smoked. He does not have any smokeless tobacco history on file. He reports current alcohol use.    Family History:  non contributory    Allergies: Patient is allergic to pcn [penicillins].        ROS  Constitutional:  Negative for fever.  Positive forgetfulness  HENT:  Negative for sore throat.    Eyes:  Negative for redness. + limited vision  Respiratory:  Negative for shortness of breath.    Cardiovascular:  Negative for chest pain.   Gastrointestinal:  Negative for nausea. Positive rectal pain  Genitourinary:  Positive urinary frequency and urgency  Musculoskeletal:  Negative for back pain.    Skin:  Negative for rash.   Neurological:  Positive for weakness.   Hematological:  Does not bruise/bleed easily.   Psychiatric/Behavioral: Negative insomnia      24 hour Vital Sign Range   Temp:  [98 °F (36.7 °C)]   Pulse:  [71-75]   Resp:  [16-18]   BP: (128-154)/(70-74)   SpO2:  [96 %-97 %]       Physical Exam  General: Alert, frail, thin, oriented x3 poor short-term memory and recall  HENT: Atraumatic, normocephalic, Right eye keteroplasty/blind, left eye poor vision, hard of hearing  CV: RRR; no murmurs, Normal s1, s2, no peripheral edema  Resp: CTAB with normal work of breathing and chest excursion on room air.   Abd: Soft, NTND. Bowel sounds normoactive. No external hemorrhoids or prolapse visible  MSK: Generalized weakness  :  +Ramirez cath  Neuro: Normal appearing coordination and sensory function.   Skin: No rashes or lesions noted, dry, warm, dressing to left ankle,  dermatitis sacrum  Psych: calm, cooperative, yells out at time         Labs:  Recent Labs   Lab 01/15/24  0643 01/16/24  0411 01/18/24  0414   WBC 7.68 7.92 8.23   HGB 12.6* 12.4* 12.0*   HCT 38.9* 38.1* 36.4*    286 297       Recent Labs  "  Lab 01/15/24  0643 01/18/24  0414    136   K 4.9 4.4    103   CO2 21* 24   BUN 34* 40*   CREATININE 1.2 1.2   GLU 82 83   CALCIUM 9.2 9.0   MG 2.1 2.3   PHOS 4.0 3.8       No results for input(s): "ALKPHOS", "ALT", "AST", "ALBUMIN", "PROT", "BILITOT", "INR" in the last 168 hours.    No results for input(s): "POCTGLUCOSE" in the last 72 hours.    Meds Scheduled:   acetaminophen  1,000 mg Oral Q8H    ascorbic acid (vitamin C)  500 mg Oral BID    aspirin  81 mg Oral Daily    calcium-vitamin D3  2 tablet Oral Daily    enoxparin  30 mg Subcutaneous Q24H (prophylaxis, 1700)    finasteride  5 mg Oral Daily    melatonin  6 mg Oral Nightly    methocarbamoL  500 mg Oral TID    mirtazapine  7.5 mg Oral QHS    oseltamivir  30 mg Oral Daily    polyethylene glycol  17 g Oral BID    senna-docusate 8.6-50 mg  2 tablet Oral BID    tamsulosin  0.8 mg Oral Daily    zinc sulfate  220 mg Oral Daily       PRN:   bisacodyL, calcium carbonate, hydrALAZINE, HYDROcodone-acetaminophen      Assessment and Plan:    Severe malnutrition  Poor appetite  Body mass index is 19.34 kg/m²., previous weight unknown  RD following  Continue Remeron  Patient reports fair appetite.   Order in place for assist with feedings due to blindness.     Urinary retention  Dysuria  Left renal cyst and Prostatomegaly found on recent CT needs Urology outpatient   Urine culture with staph however previous culture negative, asymptomatic   vitamin C, finasteride  -12/29 Failed void trial, Gray now removed with residuals 200-400 mL  - Avoid constipation  -  7 day doxy course for suspected UTI completed 1/6.    - 1/10 Bladder scan q6h in and out cath only for residual >300ml or patient discomfort. Urology appt 1/16.  Treat rectal constipation seen on KUB  1/11 Gray cath replaced due to patient's severe discomfort  1/12  no complain of pain today  1/16: Urology recs from appt today: keep gray, continue flomax and finasteride. Urology f/up in 3-4 weeks for " void trial in clinic. Son or family to be present at next appt, may need to discuss long term otpions if fails trial. Pt to start macrobid 1 wk prior to next appt. Continue to tx chronic constipation.    Constipation  CT- Limited examination. Moderately large liquid stool within the patulous rectum.  Question possible diarrheal illness and/or fecal impaction. Left renal cyst. Prostatomegaly. Osseous changes particularly of the spine.  - Miralax daily, senokot bid  - Encourge oral intake, assist with eating and drinking d/t visual impairment  - Avoid narcotics  -12/29 KUB no constipation  -1/1 Lactulose 30 g x 1   -1/6 + BM (first BM produced without aid of suppository since hospitalization)  1/9: no BM x3 days with rectal discomfort, urinary retention today. Increase senna to 2 tabs BID, miralax to BID. KUB ordered. Discontinue norvasc causes constipation  1/10 suppository daily x3  1/19/2024 see interval hx    Degenerative disc disease  Osteoporosis  Muscle spasms   Continue Robaxin QID PRN, APAP  Norco 5 mg q8h PRN use with caution due to constipation    Hyponatremia  Hypocalcemia  Dehydration  Encourage oral intake  Monitor on routine lab  Calcium with vitamin-D    Metabolic Acidosis  New 1/15: Na 137, CO2 21. Start 650 mg po na bicarb daily x 3 days.  Monitor on routine lab    Right lower extremity Baker's cyst  Noted on venous ultrasound  Continue ibuprofen prn pain, ice  Ambulatory referral to orthopedics    Insomnia  Scheduled melatonin  Remeron 7.5 mg nightly     Weakness  Debility  Patient uses walker at baseline. His son reported  prior to admit he was unable to get off of toilet and ambulate.    B12 WNL TSH 8.7 but T4 WNL   PT/OT consult   Fall precaution  Lovenox 30mg daily for VTE ppx    Moisture associated dermatitis sacrum   Triad BID   Consult wound care   Vitamin-C and zinc     Primary hypertension  Home Norvasc 5 mg b.i.d.       1/9 Discontinue Norvasc causes constipation. PRN hydralazine for now,  will add scheduled medication based on trends  1/12 BP acceptable 170/79, p.r.n. hydralazine available if persistent    Anticipate disposition:  [facility]    IP [facility] RISK OF UNPLANNED READMISSION Model: LOW      Follow-up needed during SNF admission:   Urology clinic 1/16    Follow-up needed after discharge from SNF:   - PCP within 1-2 weeks  - See appt scheduled below     Future Appointments   Date Time Provider Department Center   2/6/2024  9:00 AM Vinh Moon MD [facility] UROLOGY [facility] I certify that SNF services are required to be given on an inpatient basis because Dimitri Johnson needs for skilled nursing care and/or skilled rehabilitation are required on a daily basis and such services can only practically be provided in a skilled nursing facility setting and are for an ongoing condition for which she received inpatient care in the hospital.       Extended Visit:   Total time spent: 32 minutes  Description of Time: counseling provided on clinical condition, therapies provided, plan of care, emotional support, coordinating patient care with other care team members, reviewing and interpreting labs and imaging, collaboration with physician, initiating new orders, chart review, and documentation. See interval hx.       Nunu Suárez NP  Department of Hospital Medicine   [facility]- Skilled Nursing [facility]     DOS: 1/19/2024       Patient note was created using MModal Dictation.  Any errors in syntax or even information may not have been identified and edited on initial review prior to signing this note.

## 2024-01-19 NOTE — PLAN OF CARE
Interdisciplinary team, Nicole Colin, RN Unit Director, Kirti Adam, RN Charge Nurse, Mary Colón, , Emily Ochoa PT, Rehab, Mary Bahena, Activities, and Val Parada, Dietician, spoke to patient and patient's siblings, for care plan conference, weekly status update, and therapy progress update. Tentative discharge date set for 1/23/24.

## 2024-01-19 NOTE — CARE UPDATE
Updates sent to Teresa at [facility] on pt.; she is contacting family for financial information.

## 2024-01-19 NOTE — PT/OT/SLP PROGRESS
Occupational Therapy   Treatment    Name: Dimitri Johnson  MRN: [id number]  Admit Date: 12/26/2023  Admitting Diagnosis:  Weakness    General Precautions: Standard, aspiration, vision impaired, fall   Orthopedic Precautions: N/A   Braces: N/A    Recommendations:     Discharge Recommendations:  home health OT  Level of Assistance Recommended at Discharge: 24 hours light assistance for ADL's and homemaking tasks  Discharge Equipment Recommendations: 3-in-1 commode, wheelchair  Barriers to discharge:  Inaccessible home environment, Decreased caregiver support    Assessment:     Dimitri Johnson is a 89 y.o. male with a medical diagnosis of Weakness.  He presents with limitations in performance of self-care, functional mobility, and ADLs. Performance deficits affecting function are weakness, impaired endurance, impaired self care skills, impaired functional mobility, gait instability, impaired balance, visual deficits, decreased safety awareness, pain. Pt tolerated Tx without incident and is making progress but continues to require assist to perform self care tasks, functional mobility and functional transfers. Pt would continue to benefit from OT intervention to further functional (I)ce and safety.     Rehab Potential is good    Activity tolerance:  Good    Plan:     Patient to be seen 5 x/week to address the above listed problems via self-care/home management, therapeutic activities, therapeutic exercises    Plan of Care Expires: 01/25/24  Plan of Care Reviewed with: patient    Subjective     Communicated with: Nursing prior to session.     Pain/Comfort:  Pain Rating 1: 0/10  Pain Rating Post-Intervention 1: 0/10    Patient's cultural, spiritual, Baptism conflicts given the current situation:  no    Objective:     Patient found up in chair with gray catheter upon OT entry to room.    Bed Mobility:    Pt seated in chair at onset of treatment session.      Functional Mobility/Transfers:  Patient completed Sit <> Stand Transfer  with contact guard assistance  with  rolling walker  and step by step v/c for technique  Functional Mobility: Pt ambulated from therapy gym to room with CGA and RW for ~ 125 ft    [facility] 6 Click ADL: 16    OT Exercises: Pt performed UBE exercise for 12 minutes with Mod resistance.  UE exercises performed to increase functional endurance and strength in order increase independence when performing self care tasks, functional ambulation, W/C propulsion, and functional standing activities .     Treatment & Education:  Pt participated in gross motor balloon volleyball standing activity with CGA and RW. Activity with focus on correct standing posture, standing tolerance, functional reaching, dynamic standing bal, crossing midline, and to promote independence with homemaking and self care tasks.    Pt educated on:  - role of OT  - level of assistance  - energy conservation and task modification to maximized independence with ADL's and mobility   -  safety while performing functional transfers and self care tasks  - progress towards OT goals    Patient left  up in bedside chair  with call button in reach    GOALS:   Multidisciplinary Problems       Occupational Therapy Goals          Problem: Occupational Therapy    Goal Priority Disciplines Outcome Interventions   Occupational Therapy Goal     OT, PT/OT Ongoing, Progressing    Description: Goals to be met by: 1/17/2024 (extend to 1/25/2024)    Patient will increase functional independence with ADLs by performing:    UE Dressing with Modified [place]- Ongoing  LE Dressing with Stand-by Assistance-Ongoing  Grooming while seated at sink with Modified [place]- Ongoing  Toileting from toilet with Stand-by Assistance for hygiene and clothing management.   Bathing from sitting/standing at sink with Stand-by Assistance.  Supine to sit with Modified Bee.  Step transfer with Stand-by Assistance with RW.  Toilet transfer to toilet with Stand-by Assistance with  RW.  Footwear /c Mod A and AE as needed  Tolerate standing functionals tasks for ~5 minutes /c CGA and RW as needed -Met                         Time Tracking:     OT Date of Treatment: 01/19/24  OT Start Time: 1152    OT Stop Time: 1225  OT Total Time (min): 33 min    Billable Minutes:Therapeutic Activity 18  Therapeutic Exercise 15    1/19/2024

## 2024-01-19 NOTE — PT/OT/SLP EVAL
Speech Language Pathology  Evaluation    Dimitri Johnson   MRN: [id number]   Admitting Diagnosis: Weakness    Diet recommendations: Solid Diet Level: Regular Diet - IDDSI Level 7  Liquid Diet Level: Thin liquids - IDDSI Level 0 Standard aspiration precautions    SLP Treatment Date: 01/19/24  Speech Start Time: 1505     Speech Stop Time: 1524     Speech Total (min): 19 min       TREATMENT BILLABLE MINUTES:  Eval 11  and Self Care/Home Management Training 8    Diagnosis: Weakness      Past Medical History:   Diagnosis Date    Glaucoma     Hypertension      Past Surgical History:   Procedure Laterality Date    CORNEAL TRANSPLANT Right     CORNEAL TRANSPLANT Left 7/17/14    DSEK       Has the patient been evaluated by SLP for swallowing? : Yes  Keep patient NPO?: No General Precautions: Standard, fall, vision impaired        HPI:   Dimitri Johnson is a 89 y.o. male admitted to [facility] 12/22 with weakness and constipation. He had associated stomach discomfort that was relieved after having a large bowel movement. Daily miralax and prn dulcolax regimen started. Patient has decreased mobility due to his degenerative disc disease and deconditioning. He is hesitant to walk and stand up. He worked with PT and OT who both recommended moderate intensity therapy.      Patient will be treated at [facility] with PT and OT to improve functional status and ability to perform ADLs.      Interval history:  24 hour chart review completed. Pertinent lab, micro, and/or radiology results addressed below  Patient verbalizing concern regarding memory loss. Consult placed to SLP for cognitive eval and tx.  Vitals: Afebrile, VSS.  I/O: Patient eating well. Encourage fluids as BUN is elevated. LBM 01/17. Ramirez cath draining CYU.   Skilled Therapy: Patient progressing with therapy as tolerated and would continue to benefit from skilled PT and OT services to improve overall functional mobility and independence, strength, endurance, and safety.  "  Family considering long term placement at discharge, case management to facilitate.      Social Hx: Patient lives with son and daughter per pt. Pt states one of them is usually home with him. He no longer drives and he reports minimal medication management required.     Subjective:  "It sped up when I got in the hospital." Pt referring to memory difficulties.        Objective:        Oral Musculature Evaluation  Oral Musculature: WNL  Dentition: present and adequate  Secretion Management: adequate  Mucosal Quality: adequate  Mandibular Strength and Mobility: WNL  Oral Labial Strength and Mobility: WNL  Lingual Strength and Mobility: WNL  Velar Elevation: WNL  Buccal Strength and Mobility: WNL  Volitional Cough: strong  Volitional Swallow: elicited  Voice Prior to PO Intake: dry, clear, good volume and intensity     Cognitive Status:   Behavioral Observations: alert, appropriate, and cooperative-  Memory and Orientation: Pt was O x 4, but required increased response time and use of reasoning skills to recall temporal and spatial information. Pt was able to recall general information and complete immediate recall tasks with 100% accuracy IND. Following a 2 minute delay, pt recalled 3/3 unrelated words given min cues (2/3 IND).   Attention: WFL  Problem Solving: Pt provided appropriate responses to problem solving q's. He generated multiple causes for a hypothetical problem with good ability.   Pragmatics:  [facility]    Auditory Comprehension: answered complex yes/no questions and was able to follow 2- and 3- step commands with 100% accuracy.     Verbal Expression: conversational speech Pt able to express simple and complex information and thoughts/ideas without evidence of expressive language deficits. During a word fluency task, pt listed 11 items in a category in one minute given min cues (15-20 is wnl).     Motor Speech:  WFL    Voice:  WFL    Reading:  tba    Writing:  tba    Visual-Spatial: tba    Additional " Treatment:  Education was provided to pt regarding role of SLP, purpose of speech/language/cognitive evaluation, plan for trial cognitive therapy and stimulation, and SLP treatment plan and POC. Pt expressed understanding and agreement.     Assessment:  Dimitri Johnson is a 89 y.o. male with a medical diagnosis of Weakness and presents with mild cognitive impairments.      Discharge recommendations: Discharge Facility/Level of Care Needs:  (no further SLP needs)     Goals:   Multidisciplinary Problems       SLP Goals          Problem: SLP    Goal Priority Disciplines Outcome   SLP Goal     SLP    Description: Speech Language Pathology Goals  Goals expected to be met by 1/26:  1. Pt will recall 3/3 novel items after a 2 minute delay with modified independence.   2. Pt with O x 4.   3. Pt will list an average of 12 items in categories within one minute given min cues.   4. Pt will participate in evaluation of reading, writing, and visual spatial abilities.                                 Plan:   Patient to be seen Therapy Frequency: 3 x/week   Planned Interventions:    Plan of Care expires: 02/18/24  Plan of Care reviewed with: patient  SLP Follow-up?: Yes  SLP - Next Visit Date: 01/22/24 01/19/2024

## 2024-01-19 NOTE — PLAN OF CARE
Problem: Adult Inpatient Plan of Care  Goal: Plan of Care Review  Outcome: Ongoing, Progressing  Flowsheets (Taken 1/19/2024 0219)  Plan of Care Reviewed With: patient  Goal: Patient-Specific Goal (Individualized)  Outcome: Ongoing, Progressing  Goal: Absence of Hospital-Acquired Illness or Injury  Outcome: Ongoing, Progressing  Goal: Optimal Comfort and Wellbeing  Outcome: Ongoing, Progressing  Goal: Readiness for Transition of Care  Outcome: Ongoing, Progressing     Problem: Fall Injury Risk  Goal: Absence of Fall and Fall-Related Injury  Outcome: Ongoing, Progressing     Problem: Skin Injury Risk Increased  Goal: Skin Health and Integrity  Outcome: Ongoing, Progressing     Problem: Impaired Wound Healing  Goal: Optimal Wound Healing  Outcome: Ongoing, Progressing     Problem: Infection  Goal: Absence of Infection Signs and Symptoms  Outcome: Ongoing, Progressing     Problem: Malnutrition  Goal: Improved Nutritional Intake  Outcome: Ongoing, Progressing

## 2024-01-19 NOTE — PT/OT/SLP PROGRESS
"Physical Therapy Treatment    Patient Name:  Dimitri Johnson   MRN:  [id number]  Admit Date: 12/26/2023  Admitting Diagnosis: Weakness  Recent Surgeries: N/A    General Precautions: Standard, aspiration, vision impaired, fall  Orthopedic Precautions: N/A  Braces: N/A    Recommendations:     Discharge Recommendations: home health PT  Level of Assistance Recommended at Discharge: 24 hours light assistance  Discharge Equipment Recommendations: 3-in-1 commode, wheelchair  Barriers to discharge: Decreased caregiver support, Inaccessible home environment    Assessment:     Dimitri Johnson is a 89 y.o. male admitted with a medical diagnosis of Weakness . Pt tolerated well, pt amb x 1 trial with CGA. Pt remains anxious and forgetful although willing to give effort with mild encouragement. Pt propelled LBE with Mod res. Pt progressing and would continue to benefit from skilled PT services to improve overall functional mobility, strength and endurance.      Performance deficits affecting function: weakness, impaired endurance, impaired self care skills, impaired functional mobility, gait instability, impaired balance, decreased upper extremity function, decreased lower extremity function, decreased safety awareness, decreased ROM, impaired cardiopulmonary response to activity.    Rehab Potential is good    Activity Tolerance: Good    Plan:     Patient to be seen 5 x/week to address the above listed problems via gait training, therapeutic activities, therapeutic exercises, neuromuscular re-education, wheelchair management/training    Plan of Care Expires: 01/26/24  Plan of Care Reviewed with: patient    Subjective     "I'm perplexed, both my legs feel kind of numb, It just started in the last hour".     Pain/Comfort:  Pain Rating 1: 0/10  Pain Rating Post-Intervention 1: 0/10    Patient's cultural, spiritual, Mandaeism conflicts given the current situation:  no    Objective:      Patient found HOB elevated with gray catheter upon PT " entry to room.     Therapeutic Activities and Exercises: LBE x 10 min For BLE strengthening, endurance and ROM. Mod resistance    Patient educated on role of therapy, goals of session, and benefits of out of bed mobility.   Instructed on use of call button and importance of calling nursing staff for assistance with mobility   Questions/concerns addressed within PTA scope of practice  Pt verbalized understanding.    Functional Mobility:  Bed Mobility:     Scooting: stand by assistance, toward EOB HOB Flat  Supine to Sit: stand by assistance, HOB slightly elevated with rail and vc  Transfers:     Sit to Stand:  contact guard assistance with rolling walker. Vc to scoot to edge of chair, hand placement and sequencing   Bed to Chair: contact guard assistance with  rolling walker  using  Stand Pivot  Gait: pt amb >250 ft CGA with RW, pt mildly anxious about ambulating    AM-PAC 6 CLICK MOBILITY  17    Patient left up in chair with  PT tech .    GOALS:   Multidisciplinary Problems       Physical Therapy Goals          Problem: Physical Therapy    Goal Priority Disciplines Outcome Goal Variances Interventions   Physical Therapy Goal     PT, PT/OT Ongoing, Progressing     Description: Goals to be met by: 24     Patient will increase functional independence with mobility by performin. Supine to sit with Supervision - in progress  2. Sit to supine with Supervision - in progress  3. Rolling to Left and Right with Supervision - in progress  4. Sit to stand transfer with Stand-by Assistance - in progress  5. Bed to chair transfer with Stand-by Assistance using Rolling Walker - in progress  6. Gait  x 150 feet with Stand-by Assistance using Rolling Walker - in progress  7. Wheelchair propulsion x 150 feet with Modified [place] using bilateral upper extremities - in progress  8. New Goal 24: Ascend/descend 4 inch curb step using RW with SBA - in progress  9. New Goal 24: Ascend/descend 4 steps using BHR  with SBA (progress number of steps as tolerated) - in progress    Rehab Services' DME recommendations    Wheelchair  Number of hours up in a wheelchair per day 8      Style Light weight    Justification for light weight w/c: patient cannot propel in a standard wheelchair, patient can and does self-propel in a lightweight wheelchair, patient has impaired ability to participate in MRADLs, mobility limitations cannot be sifficiently resolved with a cane/walker, the home provides adequate access between rooms for a wheelchair, a wheelchair will significantly improve the ability to participate in MRADLs and will be used in the home on a regular basis, the patient is willing to use a wheelchair in the home, the patient has a caregiver who is available, willing, and able to provide assistance with the wheelchair, and the patient requires additional person for safety with mobility with walker  Seat Width 18 (Standard adult)  Seat Depth 18  Back Height Standard  Leg Support Standard, Heel Loops, and Swing Away  Arm Height Desk and Swing Away  Lap Belt Buckle  Accessories Anti-tippers and Safety belt  Cushion Basic  Justification for Cushion Skin Integrity    Transport Chair    3 in 1 commode Standard     Justification for 3 in 1 commode: The patient's deficits will not be sufficiently addressed by a raised toilet seat                           Time Tracking:     PT Received On: 01/19/24  PT Start Time: 1120  PT Stop Time: 1150  PT Total Time (min): 30 min    Billable Minutes: Gait Training 17 and Therapeutic Exercise 13    Treatment Type: Treatment  PT/PTA: PTA     Number of PTA visits since last PT visit: 3     01/19/2024

## 2024-01-20 PROCEDURE — 63600175 PHARM REV CODE 636 W HCPCS: Performed by: FAMILY MEDICINE

## 2024-01-20 PROCEDURE — 25000003 PHARM REV CODE 250: Performed by: INTERNAL MEDICINE

## 2024-01-20 PROCEDURE — 11000004 HC SNF PRIVATE

## 2024-01-20 PROCEDURE — 97110 THERAPEUTIC EXERCISES: CPT | Mod: CO

## 2024-01-20 PROCEDURE — 97530 THERAPEUTIC ACTIVITIES: CPT | Mod: CO

## 2024-01-20 PROCEDURE — 25000003 PHARM REV CODE 250: Performed by: FAMILY MEDICINE

## 2024-01-20 PROCEDURE — 25000003 PHARM REV CODE 250: Performed by: HOSPITALIST

## 2024-01-20 RX ADMIN — ACETAMINOPHEN 1000 MG: 500 TABLET ORAL at 09:01

## 2024-01-20 RX ADMIN — OSELTAMIVIR PHOSPHATE 30 MG: 30 CAPSULE ORAL at 09:01

## 2024-01-20 RX ADMIN — POLYETHYLENE GLYCOL 3350 17 G: 17 POWDER, FOR SOLUTION ORAL at 09:01

## 2024-01-20 RX ADMIN — Medication 2 TABLET: at 09:01

## 2024-01-20 RX ADMIN — TAMSULOSIN HYDROCHLORIDE 0.8 MG: 0.4 CAPSULE ORAL at 09:01

## 2024-01-20 RX ADMIN — ZINC SULFATE 220 MG (50 MG) CAPSULE 220 MG: CAPSULE at 10:01

## 2024-01-20 RX ADMIN — HYDROCODONE BITARTRATE AND ACETAMINOPHEN 1 TABLET: 5; 325 TABLET ORAL at 03:01

## 2024-01-20 RX ADMIN — FINASTERIDE 5 MG: 5 TABLET, FILM COATED ORAL at 09:01

## 2024-01-20 RX ADMIN — ACETAMINOPHEN 1000 MG: 500 TABLET ORAL at 06:01

## 2024-01-20 RX ADMIN — ENOXAPARIN SODIUM 30 MG: 30 INJECTION SUBCUTANEOUS at 04:01

## 2024-01-20 RX ADMIN — ASPIRIN 81 MG CHEWABLE TABLET 81 MG: 81 TABLET CHEWABLE at 09:01

## 2024-01-20 RX ADMIN — Medication 500 MG: at 09:01

## 2024-01-20 RX ADMIN — SENNOSIDES AND DOCUSATE SODIUM 2 TABLET: 8.6; 5 TABLET ORAL at 09:01

## 2024-01-20 RX ADMIN — METHOCARBAMOL 500 MG: 500 TABLET ORAL at 09:01

## 2024-01-20 RX ADMIN — MIRTAZAPINE 7.5 MG: 7.5 TABLET, FILM COATED ORAL at 09:01

## 2024-01-20 RX ADMIN — Medication 6 MG: at 09:01

## 2024-01-20 NOTE — PT/OT/SLP PROGRESS
Occupational Therapy   Treatment    Name: Dimitri Johnson  MRN: [id number]  Admit Date: 12/26/2023  Admitting Diagnosis:  Weakness    General Precautions: Standard, aspiration, vision impaired, fall   Orthopedic Precautions: N/A   Braces: N/A    Recommendations:     Discharge Recommendations:  home health OT  Level of Assistance Recommended at Discharge: 24 hours light assistance for ADL's and homemaking tasks  Discharge Equipment Recommendations: 3-in-1 commode, wheelchair  Barriers to discharge:  Inaccessible home environment, Decreased caregiver support    Assessment:     Dimitri Johnson is a 89 y.o. male with a medical diagnosis of Weakness.  He presents with limitations in performance of self-care, functional mobility, and ADLs. Performance deficits affecting function are weakness, impaired endurance, impaired self care skills, impaired functional mobility, gait instability, impaired balance, visual deficits, decreased safety awareness, pain. Pt tolerated Tx without incident and is making progress but continues to require assist to perform self care tasks, functional mobility and functional transfers. Pt would continue to benefit from OT intervention to further functional (I)ce and safety.     Rehab Potential is good    Activity tolerance:  Good    Plan:     Patient to be seen 5 x/week to address the above listed problems via self-care/home management, therapeutic activities, therapeutic exercises    Plan of Care Expires: 01/25/24  Plan of Care Reviewed with: patient    Subjective     Communicated with: Nursing prior to session.     Pain/Comfort:  Pain Rating 1: 0/10  Pain Rating Post-Intervention 1: 0/10    Patient's cultural, spiritual, Alevism conflicts given the current situation:  no    Objective:     Patient found HOB elevated with gray catheter upon OT entry to room.    Bed Mobility:    Patient completed Scooting/Bridging with supervision and with side rail  Patient completed Supine to Sit with supervision and  with side rail     Functional Mobility/Transfers:  Patient completed Sit <> Stand Transfer with contact guard assistance  with  rolling walker 2x with v/c for technique  Patient completed NuStep Chair <> WC Stand Pivot technique with contact guard assistance with rolling walker  Functional Mobility: Pt ambulated from EOB to therapy gym with CGA and RW tolerating ~ 150 ft    AMPAC 6 Click ADL: 16    OT Exercises: Recumbent cross  - level 5 x 15 minutes for LE strengthening, ROM, and endurance      Treatment & Education:  Pt educated on:  - role of OT  - level of assistance  - energy conservation and task modification to maximized independence with ADL's and mobility   -  safety while performing functional transfers and self care tasks  - progress towards OT goals      Patient left up in chair with  rehab tech present    GOALS:   Multidisciplinary Problems       Occupational Therapy Goals          Problem: Occupational Therapy    Goal Priority Disciplines Outcome Interventions   Occupational Therapy Goal     OT, PT/OT Ongoing, Progressing    Description: Goals to be met by: 1/17/2024 (extend to 1/25/2024)    Patient will increase functional independence with ADLs by performing:    UE Dressing with Modified [place]- Ongoing  LE Dressing with Stand-by Assistance-Ongoing  Grooming while seated at sink with Modified [place]- Ongoing  Toileting from toilet with Stand-by Assistance for hygiene and clothing management.   Bathing from sitting/standing at sink with Stand-by Assistance.  Supine to sit with Modified [place].  Step transfer with Stand-by Assistance with RW.  Toilet transfer to toilet with Stand-by Assistance with RW.  Footwear /c Mod A and AE as needed  Tolerate standing functionals tasks for ~5 minutes /c CGA and RW as needed -Met                         Time Tracking:     OT Date of Treatment: 01/20/24  OT Start Time: 1105    OT Stop Time: 1137  OT Total Time (min): 32 min    Billable  Minutes:Therapeutic Activity 15  Therapeutic Exercise 17    1/20/2024

## 2024-01-21 PROCEDURE — 11000004 HC SNF PRIVATE

## 2024-01-21 PROCEDURE — 63600175 PHARM REV CODE 636 W HCPCS: Performed by: FAMILY MEDICINE

## 2024-01-21 PROCEDURE — 25000003 PHARM REV CODE 250: Performed by: FAMILY MEDICINE

## 2024-01-21 PROCEDURE — 25000003 PHARM REV CODE 250: Performed by: HOSPITALIST

## 2024-01-21 PROCEDURE — 25000003 PHARM REV CODE 250: Performed by: INTERNAL MEDICINE

## 2024-01-21 RX ADMIN — METHOCARBAMOL 500 MG: 500 TABLET ORAL at 09:01

## 2024-01-21 RX ADMIN — POLYETHYLENE GLYCOL 3350 17 G: 17 POWDER, FOR SOLUTION ORAL at 09:01

## 2024-01-21 RX ADMIN — Medication 500 MG: at 09:01

## 2024-01-21 RX ADMIN — Medication 2 TABLET: at 09:01

## 2024-01-21 RX ADMIN — HYDROCODONE BITARTRATE AND ACETAMINOPHEN 1 TABLET: 5; 325 TABLET ORAL at 05:01

## 2024-01-21 RX ADMIN — MIRTAZAPINE 7.5 MG: 7.5 TABLET, FILM COATED ORAL at 09:01

## 2024-01-21 RX ADMIN — Medication 6 MG: at 09:01

## 2024-01-21 RX ADMIN — ACETAMINOPHEN 1000 MG: 500 TABLET ORAL at 09:01

## 2024-01-21 RX ADMIN — TAMSULOSIN HYDROCHLORIDE 0.8 MG: 0.4 CAPSULE ORAL at 09:01

## 2024-01-21 RX ADMIN — OSELTAMIVIR PHOSPHATE 30 MG: 30 CAPSULE ORAL at 09:01

## 2024-01-21 RX ADMIN — SENNOSIDES AND DOCUSATE SODIUM 2 TABLET: 8.6; 5 TABLET ORAL at 09:01

## 2024-01-21 RX ADMIN — ENOXAPARIN SODIUM 30 MG: 30 INJECTION SUBCUTANEOUS at 04:01

## 2024-01-21 RX ADMIN — ASPIRIN 81 MG CHEWABLE TABLET 81 MG: 81 TABLET CHEWABLE at 09:01

## 2024-01-21 RX ADMIN — ZINC SULFATE 220 MG (50 MG) CAPSULE 220 MG: CAPSULE at 09:01

## 2024-01-21 RX ADMIN — FINASTERIDE 5 MG: 5 TABLET, FILM COATED ORAL at 09:01

## 2024-01-22 PROBLEM — K59.00 CONSTIPATION: Status: RESOLVED | Noted: 2023-12-22 | Resolved: 2024-01-22

## 2024-01-22 LAB
ANION GAP SERPL CALC-SCNC: 8 MMOL/L (ref 8–16)
BASOPHILS # BLD AUTO: 0.06 K/UL (ref 0–0.2)
BASOPHILS NFR BLD: 0.7 % (ref 0–1.9)
BUN SERPL-MCNC: 36 MG/DL (ref 8–23)
CALCIUM SERPL-MCNC: 9.1 MG/DL (ref 8.7–10.5)
CHLORIDE SERPL-SCNC: 110 MMOL/L (ref 95–110)
CO2 SERPL-SCNC: 23 MMOL/L (ref 23–29)
CREAT SERPL-MCNC: 1.2 MG/DL (ref 0.5–1.4)
DIFFERENTIAL METHOD BLD: ABNORMAL
EOSINOPHIL # BLD AUTO: 0.4 K/UL (ref 0–0.5)
EOSINOPHIL NFR BLD: 4.1 % (ref 0–8)
ERYTHROCYTE [DISTWIDTH] IN BLOOD BY AUTOMATED COUNT: 14 % (ref 11.5–14.5)
EST. GFR  (NO RACE VARIABLE): 57.4 ML/MIN/1.73 M^2
GLUCOSE SERPL-MCNC: 74 MG/DL (ref 70–110)
HCT VFR BLD AUTO: 37.2 % (ref 40–54)
HGB BLD-MCNC: 12 G/DL (ref 14–18)
IMM GRANULOCYTES # BLD AUTO: 0.02 K/UL (ref 0–0.04)
IMM GRANULOCYTES NFR BLD AUTO: 0.2 % (ref 0–0.5)
LYMPHOCYTES # BLD AUTO: 2.3 K/UL (ref 1–4.8)
LYMPHOCYTES NFR BLD: 25.3 % (ref 18–48)
MAGNESIUM SERPL-MCNC: 2.1 MG/DL (ref 1.6–2.6)
MCH RBC QN AUTO: 31.2 PG (ref 27–31)
MCHC RBC AUTO-ENTMCNC: 32.3 G/DL (ref 32–36)
MCV RBC AUTO: 97 FL (ref 82–98)
MONOCYTES # BLD AUTO: 0.9 K/UL (ref 0.3–1)
MONOCYTES NFR BLD: 9.3 % (ref 4–15)
NEUTROPHILS # BLD AUTO: 5.5 K/UL (ref 1.8–7.7)
NEUTROPHILS NFR BLD: 60.4 % (ref 38–73)
NRBC BLD-RTO: 0 /100 WBC
PHOSPHATE SERPL-MCNC: 4.1 MG/DL (ref 2.7–4.5)
PLATELET # BLD AUTO: 289 K/UL (ref 150–450)
PMV BLD AUTO: 11.1 FL (ref 9.2–12.9)
POTASSIUM SERPL-SCNC: 4.7 MMOL/L (ref 3.5–5.1)
RBC # BLD AUTO: 3.85 M/UL (ref 4.6–6.2)
SODIUM SERPL-SCNC: 141 MMOL/L (ref 136–145)
WBC # BLD AUTO: 9.17 K/UL (ref 3.9–12.7)

## 2024-01-22 PROCEDURE — 63600175 PHARM REV CODE 636 W HCPCS: Performed by: FAMILY MEDICINE

## 2024-01-22 PROCEDURE — 80048 BASIC METABOLIC PNL TOTAL CA: CPT | Performed by: HOSPITALIST

## 2024-01-22 PROCEDURE — 97535 SELF CARE MNGMENT TRAINING: CPT | Mod: CO

## 2024-01-22 PROCEDURE — 36415 COLL VENOUS BLD VENIPUNCTURE: CPT | Performed by: HOSPITALIST

## 2024-01-22 PROCEDURE — 25000003 PHARM REV CODE 250: Performed by: FAMILY MEDICINE

## 2024-01-22 PROCEDURE — 97535 SELF CARE MNGMENT TRAINING: CPT

## 2024-01-22 PROCEDURE — 97110 THERAPEUTIC EXERCISES: CPT

## 2024-01-22 PROCEDURE — 25000003 PHARM REV CODE 250: Performed by: HOSPITALIST

## 2024-01-22 PROCEDURE — 11000004 HC SNF PRIVATE

## 2024-01-22 PROCEDURE — 84100 ASSAY OF PHOSPHORUS: CPT | Performed by: HOSPITALIST

## 2024-01-22 PROCEDURE — 83735 ASSAY OF MAGNESIUM: CPT | Performed by: HOSPITALIST

## 2024-01-22 PROCEDURE — 25000003 PHARM REV CODE 250: Performed by: INTERNAL MEDICINE

## 2024-01-22 PROCEDURE — 97530 THERAPEUTIC ACTIVITIES: CPT

## 2024-01-22 PROCEDURE — 85025 COMPLETE CBC W/AUTO DIFF WBC: CPT | Performed by: HOSPITALIST

## 2024-01-22 PROCEDURE — 97129 THER IVNTJ 1ST 15 MIN: CPT

## 2024-01-22 PROCEDURE — 97116 GAIT TRAINING THERAPY: CPT

## 2024-01-22 RX ORDER — FERROUS SULFATE, DRIED 160(50) MG
2 TABLET, EXTENDED RELEASE ORAL DAILY
Qty: 60 TABLET | Refills: 11
Start: 2024-01-23 | End: 2025-01-22

## 2024-01-22 RX ORDER — CALCIUM CARBONATE 200(500)MG
500 TABLET,CHEWABLE ORAL 2 TIMES DAILY PRN
Start: 2024-01-22 | End: 2025-01-21

## 2024-01-22 RX ORDER — ACETAMINOPHEN 500 MG
1000 TABLET ORAL EVERY 8 HOURS PRN
Refills: 0
Start: 2024-01-22

## 2024-01-22 RX ORDER — ASCORBIC ACID 500 MG
500 TABLET ORAL 2 TIMES DAILY
Start: 2024-01-22

## 2024-01-22 RX ORDER — NITROFURANTOIN 25; 75 MG/1; MG/1
100 CAPSULE ORAL 2 TIMES DAILY
Qty: 28 CAPSULE | Refills: 0
Start: 2024-02-12 | End: 2024-01-23

## 2024-01-22 RX ORDER — AMOXICILLIN 250 MG
2 CAPSULE ORAL 2 TIMES DAILY
Start: 2024-01-22

## 2024-01-22 RX ORDER — TALC
6 POWDER (GRAM) TOPICAL NIGHTLY
Refills: 0
Start: 2024-01-22

## 2024-01-22 RX ORDER — ZINC SULFATE 50(220)MG
220 CAPSULE ORAL DAILY
Start: 2024-01-23

## 2024-01-22 RX ORDER — MIRTAZAPINE 7.5 MG/1
7.5 TABLET, FILM COATED ORAL NIGHTLY
Qty: 30 TABLET | Refills: 11
Start: 2024-01-22 | End: 2025-01-21

## 2024-01-22 RX ORDER — POLYETHYLENE GLYCOL 3350 17 G/17G
17 POWDER, FOR SOLUTION ORAL 2 TIMES DAILY
Refills: 0
Start: 2024-01-22

## 2024-01-22 RX ORDER — METHOCARBAMOL 500 MG/1
500 TABLET, FILM COATED ORAL 3 TIMES DAILY PRN
Qty: 30 TABLET | Refills: 0
Start: 2024-01-22 | End: 2024-02-01

## 2024-01-22 RX ORDER — TAMSULOSIN HYDROCHLORIDE 0.4 MG/1
0.8 CAPSULE ORAL DAILY
Qty: 60 CAPSULE | Refills: 11
Start: 2024-01-23 | End: 2024-05-13

## 2024-01-22 RX ADMIN — METHOCARBAMOL 500 MG: 500 TABLET ORAL at 02:01

## 2024-01-22 RX ADMIN — ACETAMINOPHEN 1000 MG: 500 TABLET ORAL at 02:01

## 2024-01-22 RX ADMIN — TAMSULOSIN HYDROCHLORIDE 0.8 MG: 0.4 CAPSULE ORAL at 08:01

## 2024-01-22 RX ADMIN — POLYETHYLENE GLYCOL 3350 17 G: 17 POWDER, FOR SOLUTION ORAL at 08:01

## 2024-01-22 RX ADMIN — HYDROCODONE BITARTRATE AND ACETAMINOPHEN 1 TABLET: 5; 325 TABLET ORAL at 02:01

## 2024-01-22 RX ADMIN — FINASTERIDE 5 MG: 5 TABLET, FILM COATED ORAL at 08:01

## 2024-01-22 RX ADMIN — ASPIRIN 81 MG CHEWABLE TABLET 81 MG: 81 TABLET CHEWABLE at 08:01

## 2024-01-22 RX ADMIN — SENNOSIDES AND DOCUSATE SODIUM 2 TABLET: 8.6; 5 TABLET ORAL at 09:01

## 2024-01-22 RX ADMIN — MIRTAZAPINE 7.5 MG: 7.5 TABLET, FILM COATED ORAL at 09:01

## 2024-01-22 RX ADMIN — METHOCARBAMOL 500 MG: 500 TABLET ORAL at 09:01

## 2024-01-22 RX ADMIN — Medication 500 MG: at 09:01

## 2024-01-22 RX ADMIN — ZINC SULFATE 220 MG (50 MG) CAPSULE 220 MG: CAPSULE at 08:01

## 2024-01-22 RX ADMIN — ACETAMINOPHEN 1000 MG: 500 TABLET ORAL at 09:01

## 2024-01-22 RX ADMIN — METHOCARBAMOL 500 MG: 500 TABLET ORAL at 08:01

## 2024-01-22 RX ADMIN — Medication 500 MG: at 08:01

## 2024-01-22 RX ADMIN — SENNOSIDES AND DOCUSATE SODIUM 2 TABLET: 8.6; 5 TABLET ORAL at 08:01

## 2024-01-22 RX ADMIN — ENOXAPARIN SODIUM 30 MG: 30 INJECTION SUBCUTANEOUS at 04:01

## 2024-01-22 RX ADMIN — Medication 6 MG: at 09:01

## 2024-01-22 RX ADMIN — Medication 2 TABLET: at 08:01

## 2024-01-22 RX ADMIN — OSELTAMIVIR PHOSPHATE 30 MG: 30 CAPSULE ORAL at 08:01

## 2024-01-22 NOTE — PT/OT/SLP PROGRESS
"Speech Language Pathology  Treatment    Dimitri Johnson   MRN: [id number]   Admitting Diagnosis: Weakness    Diet recommendations: Solid Diet Level: Regular Diet - IDDSI Level 7  Liquid Diet Level: Thin liquids - IDDSI Level 0 Monitor for s/s of aspiration    SLP Treatment Date: 01/22/24  Speech Start Time: 0943     Speech Stop Time: 1010     Speech Total (min): 27 min       TREATMENT BILLABLE MINUTES:  Speech Therapy Individual (cognitive therapy) 19 and Self Care/Home Management Training 8    Has the patient been evaluated by SLP for swallowing? : Yes  Keep patient NPO?: No   General Precautions: Standard, fall, vision impaired          Subjective:  "I don't remember whether I do or not." "I think I don't do that anymore." Pt unable to recalled if he was oriented to time and if he participate in managing his finances prior to this hospitalization. Pt was able to recall some long term personal information, like having earned a masters and PhD in philosophy and being a  at [employer] and [employer]. He was unable to recall the year or his age when he retired.     Objective:      Pt seen for ongoing cognitive therapy while sitting up in w/c in room.  Pt was oriented x 4, requiring increased time to recall month.  Following a 2 minute then 5 minute delay, pt was able to recall 3/3 novel items without assistance.  Pt solved math word problems related to time and money with 100% accuracy requiring repetition for 1 question due to decreased recall.  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 He was unable to read time on wall clock, but able to tell time in smaller printed clocks when held closely with 80% accuracy IND/100% given cues.  Education was provided to pt regarding role of SLP, cognition, long term and short term memory, memory strategies, assessment of higher level cognition, and SLP treatment plan and POC.  Pt " demonstrated understanding of education provided, but will benefit from continued reinforcement.       Assessment:  Dimitri Johnson is a 89 y.o. male with a medical diagnosis of Weakness and presents with cognitive impairments.       Discharge recommendations: Discharge Facility/Level of Care Needs: home health speech therapy     Goals:   Multidisciplinary Problems       SLP Goals          Problem: SLP    Goal Priority Disciplines Outcome   SLP Goal     SLP    Description: Speech Language Pathology Goals  Goals expected to be met by 1/26:  1. Pt will recall 3/3 novel items after a 2 minute delay with modified independence.   2. Pt with O x 4.   3. Pt will list an average of 12 items in categories within one minute given min cues.   4. Pt will participate in evaluation of reading, writing, and visual spatial abilities. Discontinue goal. Unable to assess 2/2 blindness in right eye and visual impairments in left eye                                Plan:   Patient to be seen Therapy Frequency: 3 x/week  Planned Interventions: Cognitive-Linguistic Therapy  Plan of Care expires: 02/18/24  Plan of Care reviewed with: patient  SLP Follow-up?: Yes  SLP - Next Visit Date: 01/24/24 (if not discharged on 1/23)             01/22/2024

## 2024-01-22 NOTE — CARE UPDATE
Family decided on [facility]  Sent clinical updates and orders to [facility] today for admit tomorrow  Expected betw 1-2 pm per family to transport once facility approves officially  Will expect that transportation should be set up, w/c van for him tomorrow  Family will pay for, call [employer]anner for payment information at [phone]  Will follow up tomorrow with family and facility

## 2024-01-22 NOTE — PLAN OF CARE
D/C scheduled on 24.    Problem: Physical Therapy  Goal: Physical Therapy Goal  Description: Goals to be met by: 24     Patient will increase functional independence with mobility by performin. Supine to sit with Supervision - MET 24  2. Sit to supine with Supervision - MET 24  3. Rolling to Left and Right with Supervision - MET 24  4. Sit to stand transfer with Stand-by Assistance - not met, CGA for safety  5. Bed to chair transfer with Stand-by Assistance using Rolling Walker - not met, CGA for safety  6. Gait  x 150 feet with Stand-by Assistance using Rolling Walker - not met, CGA for safety  7. Wheelchair propulsion x 150 feet with Modified [place] using bilateral upper extremities - not met, CGA/SBA for safety secondary to visual deficits  8. New Goal 24: Ascend/descend 4 inch curb step using RW with SBA - not met, CGA for safety  9. New Goal 24: Ascend/descend 4 steps using BHR with SBA (progress number of steps as tolerated) - not met, CGA for safety    Rehab Services' DME recommendations    Wheelchair  Number of hours up in a wheelchair per day 8      Style Light weight    Justification for light weight w/c: patient cannot propel in a standard wheelchair, patient can and does self-propel in a lightweight wheelchair, patient has impaired ability to participate in MRADLs, mobility limitations cannot be sifficiently resolved with a cane/walker, the home provides adequate access between rooms for a wheelchair, a wheelchair will significantly improve the ability to participate in MRADLs and will be used in the home on a regular basis, the patient is willing to use a wheelchair in the home, the patient has a caregiver who is available, willing, and able to provide assistance with the wheelchair, and the patient requires additional person for safety with mobility with walker  Seat Width 18 (Standard adult)  Seat Depth 18  Back Height Standard  Leg Support Standard, Heel  Loops, and Swing Away  Arm Height Desk and Swing Away  Lap Belt Buckle  Accessories Anti-tippers and Safety belt  Cushion Basic  Justification for Cushion Skin Integrity    Transport Chair    3 in 1 commode Standard     Justification for 3 in 1 commode: The patient's deficits will not be sufficiently addressed by a raised toilet seat      Outcome: Adequate for Care Transition   1/22/2024

## 2024-01-22 NOTE — CARE UPDATE
Notified NP that pt has choose [facility] and that discharge was pending tomorrow.    Returned son phone call stating had not heard from other facilities ([facility] nor [facility]) and that we were were on target for an admit tomorrow.     Son is complete paperwork today at [facility]. To follow up with Fal[place]ia and family later in the day.

## 2024-01-22 NOTE — PT/OT/SLP PROGRESS
Occupational Therapy   Treatment/ Discharge Note    Name: Dimitri Johnson  MRN: [id number]  Admit Date: 12/26/2023  Admitting Diagnosis:  Weakness    General Precautions: Standard, aspiration, vision impaired, fall   Orthopedic Precautions: N/A   Braces: N/A    Recommendations:     Discharge Recommendations:  home health OT  Level of Assistance Recommended at Discharge: 24 hours light assistance for ADL's and homemaking tasks  Discharge Equipment Recommendations: 3-in-1 commode, wheelchair  Barriers to discharge:  Inaccessible home environment, Decreased caregiver support    Assessment:     Dimitri Johnson is a 89 y.o. male with a medical diagnosis of Weakness.  He presents with limitations in performance of self-care, functional mobility, and ADLs. Performance deficits affecting function are weakness, impaired endurance, impaired self care skills, impaired functional mobility, gait instability, impaired balance, visual deficits, decreased safety awareness, pain. Pt tolerated Tx without incident and is making progress but continues to require assist to perform self care tasks, functional mobility and functional transfers. Pt scheduled to discharge on 1/23/24 with home health recommended. Pt would continue to benefit from OT intervention to further functional (I)ce and safety.     Rehab Potential is good    Activity tolerance:  Good    Plan:     Patient to be seen 5 x/week to address the above listed problems via self-care/home management, therapeutic activities, therapeutic exercises    Plan of Care Expires: 01/25/24  Plan of Care Reviewed with: patient    Subjective     Communicated with: Nursing prior to session.     Pain/Comfort:  Pain Rating 1: 0/10  Pain Rating Post-Intervention 1: 0/10    Patient's cultural, spiritual, Mandaeism conflicts given the current situation:  no    Objective:     Patient found up in chair with gray catheter upon OT entry to room.    Bed Mobility:    Pt seated in chair at onset of treatment  session.     Functional Mobility/Transfers:  Patient completed Sit <> Stand Transfer with stand by assistance  with  rolling walker and v/c for technique  Patient completed Chair <> Bedside Chair Step Transfer technique with stand by assistance with rolling walker  Functional Mobility: Pt ambulated functional distances in room/bathroom with close SBA and RW    Activities of Daily Living:  Oral Hygiene with stand by assistance while standing at sink level with RW and v/c for where items are located secondary to visual impairment  Personal Hygiene with stand by assistance while standing at sink level with RW  Bathing with stand by assistance from sitting/standing sink side with RW when standing to perform mauricio care with v/c for sequencing  Upper Body Dressing with stand by assistance to doff/naseem pull over shirt with set up   Lower Body Dressing with minimum assistance to doff/naseem pants with (A) to steady in standing when managing pants and (A) to thread catheter   Footwear with minimum assistance to doff/naseem B socks with (A) to naseem R socks      AMPAC 6 Click ADL: 18    Treatment & Education:  Pt educated on:  - role of OT  - level of assistance  - energy conservation and task modification to maximized independence with ADL's and mobility   -  safety while performing functional transfers and self care tasks  - progress towards OT goals      Patient left  up in bedside chair  with call button in reach    GOALS:   Multidisciplinary Problems       Occupational Therapy Goals          Problem: Occupational Therapy    Goal Priority Disciplines Outcome Interventions   Occupational Therapy Goal     OT, PT/OT Ongoing, Progressing    Description: Goals to be met by: 1/17/2024 (extend to 1/25/2024)    Patient will increase functional independence with ADLs by performing:    UE Dressing with Modified [place]- Not Met  LE Dressing with Stand-by Assistance-Not Met  Grooming while seated at sink with Modified [place]-  Not Met  Toileting from toilet with Stand-by Assistance for hygiene and clothing management. -Met  Bathing from sitting/standing at sink with Stand-by Assistance. -Met  Supine to sit with Modified [place]. -Not Met  Step transfer with Stand-by Assistance with RW. -met  Toilet transfer to toilet with Stand-by Assistance with RW. -not met with therapy  Footwear /c Mod A and AE as needed -Met  Tolerate standing functionals tasks for ~5 minutes /c CGA and RW as needed -Met                         Time Tracking:     OT Date of Treatment: 01/22/24  OT Start Time: 1038    OT Stop Time: 1116  OT Total Time (min): 38 min    Billable Minutes:Self Care/Home Management 38    1/22/2024

## 2024-01-22 NOTE — PROGRESS NOTES
[facility]                                  Skilled Nursing Facility                   Progress Note     Patient Name: Dimitri Johnson  YOB: 1933  MRN: [id number]  Room: [id number]/BOQL827 A     Admit Date: 12/26/2023   PALOMO: 1/23/2024     Principal Problem:  Weakness    HPI obtained from patient interview and chart review     Chief Complaint: Re-evaluation of medical treatment and therapy status, discharge planning    HPI:   Dimitri Johnson is a 89 y.o. male admitted to [facility] 12/22 with weakness and constipation. He had associated stomach discomfort that was relieved after having a large bowel movement. Daily miralax and prn dulcolax regimen started. Patient has decreased mobility due to his degenerative disc disease and deconditioning. He is hesitant to walk and stand up. He worked with PT and OT who both recommended moderate intensity therapy.     Patient will be treated at [facility] with PT and OT to improve functional status and ability to perform ADLs.     Interval history:  24 hour chart review completed. Pertinent lab, micro, and/or radiology results addressed below  Vitals: Afebrile, VSS. HTN intermittently hovering at upper limits of goal. Monitor  I/O: Patient eating well. Encourage fluids given elevated BUN trending down today 36. LBM 01/21 Ramirez cath draining CYU.   Skilled Therapy: Patient progressing with therapy as tolerated and would continue to benefit from skilled PT and OT and ST services to improve overall functional mobility and independence, strength, endurance, and safety.   Plan for discharge tomorrow to [facility].  Orders placed.  Ramirez catheter to remain in place upon discharge until seen by Urology outpatient    Past Medical History: Patient has a past medical history of Glaucoma and Hypertension. 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    Social History: Patient reports that he has never smoked. He does not have any smokeless tobacco history on file. He reports current alcohol use.    Family History:  non contributory    Allergies: Patient is allergic to pcn [penicillins].        ROS  Constitutional:  Negative for fever.  Positive forgetfulness  HENT:  Negative for sore throat.    Eyes:  Negative for redness. + limited vision  Respiratory:  Negative for shortness of breath.    Cardiovascular:  Negative for chest pain.   Gastrointestinal:  Negative for nausea. Positive rectal pain  Genitourinary:  Positive urinary frequency and urgency  Musculoskeletal:  Negative for back pain.    Skin:  Negative for rash.   Neurological:  Positive for weakness.   Hematological:  Does not bruise/bleed easily.   Psychiatric/Behavioral: Negative insomnia      24 hour Vital Sign Range   Temp:  [98 °F (36.7 °C)-98.3 °F (36.8 °C)]   Pulse:  [67-71]   Resp:  [16]   BP: (125-141)/(67-71)   SpO2:  [96 %]       Physical Exam  General: Alert, frail, thin, oriented x3 poor short-term memory and recall  HENT: Atraumatic, normocephalic, Right eye keteroplasty/blind, left eye poor vision, hard of hearing  CV: RRR; no murmurs, Normal s1, s2, no peripheral edema  Resp: CTAB with normal work of breathing and chest excursion on room air.   Abd: Soft, NTND. Bowel sounds normoactive. No external hemorrhoids or prolapse visible  MSK: Generalized weakness  :  +Ramirez cath  Neuro: Normal appearing coordination and sensory function.   Skin: No rashes or lesions noted, dry, warm, dressing to left ankle,  dermatitis sacrum  Psych: calm, cooperative, yells out at time         Labs:  Recent Labs   Lab 01/16/24 0411 01/18/24 0414 01/22/24  0519   WBC 7.92 8.23 9.17   HGB 12.4* 12.0* 12.0*   HCT 38.1* 36.4* 37.2*    297 289       Recent Labs   Lab 01/18/24 0414 01/22/24  0519  "   141   K 4.4 4.7    110   CO2 24 23   BUN 40* 36*   CREATININE 1.2 1.2   GLU 83 74   CALCIUM 9.0 9.1   MG 2.3 2.1   PHOS 3.8 4.1       No results for input(s): "ALKPHOS", "ALT", "AST", "ALBUMIN", "PROT", "BILITOT", "INR" in the last 168 hours.    No results for input(s): "POCTGLUCOSE" in the last 72 hours.    Meds Scheduled:   acetaminophen  1,000 mg Oral Q8H    ascorbic acid (vitamin C)  500 mg Oral BID    aspirin  81 mg Oral Daily    calcium-vitamin D3  2 tablet Oral Daily    enoxparin  30 mg Subcutaneous Q24H (prophylaxis, 1700)    finasteride  5 mg Oral Daily    melatonin  6 mg Oral Nightly    methocarbamoL  500 mg Oral TID    mirtazapine  7.5 mg Oral QHS    polyethylene glycol  17 g Oral BID    senna-docusate 8.6-50 mg  2 tablet Oral BID    tamsulosin  0.8 mg Oral Daily    zinc sulfate  220 mg Oral Daily       PRN:   bisacodyL, calcium carbonate, hydrALAZINE, HYDROcodone-acetaminophen      Assessment and Plan:    Severe malnutrition  Poor appetite  Body mass index is 18.08 kg/m²., previous weight unknown  RD following  Continue Remeron  Patient reports fair appetite.   Order in place for assist with feedings due to blindness.     Urinary retention  Dysuria  Left renal cyst and Prostatomegaly found on recent CT needs Urology outpatient   Urine culture with staph however previous culture negative, asymptomatic   vitamin C, finasteride  -12/29 Failed void trial, Gray now removed with residuals 200-400 mL  - Avoid constipation  -  7 day doxy course for suspected UTI completed 1/6.    - 1/10 Bladder scan q6h in and out cath only for residual >300ml or patient discomfort. Urology appt 1/16.  Treat rectal constipation seen on KUB  1/11 Gray cath replaced due to patient's severe discomfort  1/12  no complain of pain today  1/16: Urology recs from appt today: keep gray, continue flomax and finasteride. Urology f/up in 3-4 weeks for void trial in clinic. Son or family to be present at next appt, may need " to discuss long term otpions if fails trial. Pt to start macrobid 1 wk prior to next appt. Continue to tx chronic constipation.    Constipation  CT- Limited examination. Moderately large liquid stool within the patulous rectum.  Question possible diarrheal illness and/or fecal impaction. Left renal cyst. Prostatomegaly. Osseous changes particularly of the spine.  - Miralax daily, senokot bid  - Encourge oral intake, assist with eating and drinking d/t visual impairment  - Avoid narcotics  -12/29 KUB no constipation  -1/1 Lactulose 30 g x 1   -1/6 + BM (first BM produced without aid of suppository since hospitalization)  1/9: no BM x3 days with rectal discomfort, urinary retention today. Increase senna to 2 tabs BID, miralax to BID. KUB ordered. Discontinue norvasc causes constipation  1/10 suppository daily x3  1/22/2024 last BM 1/21    Degenerative disc disease  Osteoporosis  Muscle spasms   Continue Robaxin QID PRN, APAP    Hyponatremia, resolved  Hypocalcemia, resolved  Dehydration  Encourage oral intake  Monitor on routine lab  Calcium with vitamin-D  1/22 BUN 36    Metabolic Acidosis  Monitor on routine lab    Right lower extremity Baker's cyst  Noted on venous ultrasound  Continue ibuprofen prn pain, ice  Ambulatory referral to orthopedics placed    Insomnia  Scheduled melatonin  Remeron 7.5 mg nightly     Weakness  Debility  Patient uses walker at baseline. His son reported  prior to admit he was unable to get off of toilet and ambulate.    B12 WNL TSH 8.7 but T4 WNL   PT/OT consult   Fall precaution  Lovenox 30mg daily for VTE ppx    Moisture associated dermatitis sacrum   Triad BID   Consult wound care   Vitamin-C and zinc     Primary hypertension  Home Norvasc discontinued due to constipation       Has been normotensive for the most part off medication    Anticipate disposition:  [facility] tomorrow    IP [facility] RISK OF UNPLANNED READMISSION Model: LOW      Follow-up needed during SNF admission:   Urology  clinic 1/16    Follow-up needed after discharge from SNF:   - PCP within 1-2 weeks  - See appt scheduled below     Future Appointments   Date Time Provider Department Center   2/6/2024  9:00 AM Vnih Moon MD [facility] UROLOGY [facility] I certify that SNF services are required to be given on an inpatient basis because Dimitri Johnson needs for skilled nursing care and/or skilled rehabilitation are required on a daily basis and such services can only practically be provided in a skilled nursing facility setting and are for an ongoing condition for which she received inpatient care in the hospital.       Extended Visit:   Total time spent: 46 minutes  Description of Time: counseling provided on clinical condition, therapies provided, plan of care, emotional support, coordinating patient care with other care team members, reviewing and interpreting labs and imaging, collaboration with physician, initiating new orders, chart review, and documentation. See interval hx.       SADIE GÓMEZ  Department of Hospital Medicine   [facility]     DOS: 1/22/2024       Patient note was created using MModal Dictation.  Any errors in syntax or even information may not have been identified and edited on initial review prior to signing this note.

## 2024-01-22 NOTE — PLAN OF CARE
Problem: Adult Inpatient Plan of Care  Goal: Plan of Care Review  Outcome: Ongoing, Progressing  Goal: Patient-Specific Goal (Individualized)  Outcome: Ongoing, Progressing  Goal: Absence of Hospital-Acquired Illness or Injury  Outcome: Ongoing, Progressing  Goal: Optimal Comfort and Wellbeing  Outcome: Ongoing, Progressing  Goal: Readiness for Transition of Care  Outcome: Ongoing, Progressing     Problem: Fall Injury Risk  Goal: Absence of Fall and Fall-Related Injury  Outcome: Ongoing, Progressing  Intervention: Identify and Manage Contributors  Flowsheets (Taken 1/22/2024 0343)  Self-Care Promotion:   BADL personal objects within reach   BADL personal routines maintained  Medication Review/Management: medications reviewed  Intervention: Promote Injury-Free Environment  Flowsheets (Taken 1/22/2024 0343)  Safety Promotion/Fall Prevention:   assistive device/personal item within reach   side rails raised x 2   Fall Risk signage in place   high risk medications identified     Problem: Skin Injury Risk Increased  Goal: Skin Health and Integrity  Outcome: Ongoing, Progressing     Problem: Impaired Wound Healing  Goal: Optimal Wound Healing  Outcome: Ongoing, Progressing     Problem: Infection  Goal: Absence of Infection Signs and Symptoms  Outcome: Ongoing, Progressing     Problem: Malnutrition  Goal: Improved Nutritional Intake  Outcome: Ongoing, Progressing

## 2024-01-22 NOTE — PT/OT/SLP PROGRESS
"Physical Therapy Treatment/Discharge Note    Patient Name:  Dimitri Johnson   MRN:  [id number]  Admit Date: 12/26/2023  Admitting Diagnosis: Weakness  Recent Surgeries: N/A    General Precautions: Standard, fall, vision impaired  Orthopedic Precautions: N/A  Braces: N/A    Recommendations:     Discharge Recommendations: home health PT  Level of Assistance Recommended at Discharge: 24 hours light assistance  Discharge Equipment Recommendations: 3-in-1 commode, wheelchair  Barriers to discharge: Inaccessible home environment, Decreased caregiver support    Assessment:     Dimitri Johnson is a 89 y.o. male admitted with a medical diagnosis of Weakness. Patient agreeable to PT treatment this AM. Patient pleasant and cooperative. D/C GG scoring completed during session. Patient currently able to perform bed mobility with Supervision and complete functional transfers, ambulation, stair training and curb step negotiation with CGA. Visual deficits limiting overall safety during mobility with verbal cues provided as needed. Patient scheduled for D/C on 1/23/24 with recommendation for Home Health PT/OT/SLP for continued progression of mobility toward increased functional independence.       Performance deficits affecting function: weakness, impaired endurance, impaired self care skills, impaired functional mobility, gait instability, impaired balance, decreased lower extremity function, visual deficits, decreased safety awareness.    Rehab Potential is good    Activity Tolerance: Good    Plan:     Patient to be seen 5 x/week to address the above listed problems via gait training, therapeutic activities, therapeutic exercises, neuromuscular re-education, wheelchair management/training    Plan of Care Expires: 01/26/24  Plan of Care Reviewed with: patient    Subjective     "I am neutral. I have nothing to really compare the feeling to."     Pain/Comfort:  Pain Rating 1: 0/10  Pain Rating Post-Intervention 1: 0/10    Patient's cultural, " spiritual, Cheondoism conflicts given the current situation:  no    Objective:     Communicated with nursing staff prior to session.  Patient found supine with gray catheter upon PT entry to room.     Therapeutic Activities and Exercises:   D/C GG scoring completed during session. See chart below for details.    PT assisted patient with changing of brief and donning of pants in supine at start of session. Repeated rolling and bridging performed for completion of tasks. Gray catheter fed through pant leg for appropriate drainage.     Seated BLE exercises 2 x 10 reps each including ankle DF/PF, hip flexion and LAQs. Rest breaks as needed.     Functional Mobility:     01/22/24 0840   Prior Functioning: Everyday Activities   Self Care Independent   Indoor Mobility (Ambulation) Independent   Stairs Not applicable  (Patient reports having chair lift for steps within home)   Functional Cognition Independent   Prior Device Use Walker   Functional Limitation in Range of Motion   Upper Extremity No impairment   Lower Extremity No impairment   Mobility Devices Walker;Wheelchair (manual or electric)   Roll Left and Right   Was the activity attempted? Yes   Was the activity done independently? No   Assistance Needed Supervision  (Supervision to roll R/L with HOB flat and use of bed rails)   Was adaptive equipment used? Yes   CARE Score - Roll Left and Right 4   Sit to Lying   Was the activity attempted? Yes   Was the activity done independently? No   Assistance Needed Supervision  (Supervision with HOB flat and use of bed rails)   Was adaptive equipment used? Yes   CARE Score - Sit to Lying 4   Lying to Sitting on Side of Bed   Was the activity attempted? Yes   Was the activity done independently? No   Assistance Needed Supervision  (Supervision with HOB flat and use of bed rails)   Was adaptive equipment used? Yes   CARE Score - Lying to Sitting on Side of Bed 4   Sit to Stand   Was the activity attempted? Yes   Was the  activity done independently? No   Assistance Needed Verbal cues;Touching assistance  (CGA using RW)   Was adaptive equipment used? Yes   CARE Score - Sit to Stand 4   Chair/Bed-to-Chair Transfer   Was the activity attempted? Yes   Was the activity done independently? No   Assistance Needed Verbal cues;Touching assistance  (CGA using RW)   Was adaptive equipment used? Yes   CARE Score - Chair/Bed-to-Chair Transfer 4   Car Transfer   Was the activity attempted? No   Reason if not Attempted Environmental limitations   CARE Score - Car Transfer 10   Walk 10 Feet   Was the activity attempted? Yes   Was the activity done independently? No   Assistance Needed Touching assistance;Verbal cues  (Patient ambulated 150 feet using RW with CGA. Verbal cues for direction change secondary to visual deficits. W/C in tow. No LOB.)   Was adaptive equipment used? Yes   CARE Score - Walk 10 Feet 4   Walk 50 Feet with Two Turns   Was the activity attempted? Yes   Was the activity done independently? No   Assistance Needed Touching assistance;Verbal cues  (Patient ambulated 150 feet using RW with CGA. Verbal cues for direction change secondary to visual deficits. W/C in tow. No LOB.)   Was adaptive equipment used? Yes   CARE Score - Walk 50 Feet with Two Turns 4   Walk 150 Feet   Was the activity attempted? Yes   Was the activity done independently? No   Assistance Needed Touching assistance;Verbal cues  (Patient ambulated 150 feet using RW with CGA. Verbal cues for direction change secondary to visual deficits. W/C in tow. No LOB.)   Was adaptive equipment used? Yes   CARE Score - Walk 150 Feet 4   Walking 10 Feet on Uneven Surfaces   Was the activity attempted? Yes   Was the activity done independently? No   Assistance Needed Touching assistance;Verbal cues  (CGA using RW with verbal cues)   Was adaptive equipment used? Yes   CARE Score - Walking 10 Feet on Uneven Surfaces 4   1 Step (Curb)   Was the activity attempted? Yes   Was the  activity done independently? No   Assistance Needed Touching assistance;Verbal cues  (CGA to ascend/descend 4 inch curb step using RW with verbal cues for walker placement.)   Was adaptive equipment used? Yes   CARE Score - 1 Step (Curb) 4   4 Steps   Was the activity attempted? Yes   Was the activity done independently? No   Assistance Needed Touching assistance;Verbal cues  (CGA to ascend/descend 4 steps using BHR with verbal cues for stepping sequence.)   Was adaptive equipment used? Yes   CARE Score - 4 Steps 4   12 Steps   Was the activity attempted? No   Reason if not Attempted Safety concerns   CARE Score - 12 Steps 88   Picking Up Object   Was the activity attempted? Yes   Was the activity done independently? No   Assistance Needed Touching assistance;Verbal cues  (CGA using RW and reacher)   Was adaptive equipment used? Yes   CARE Score - Picking Up Object 4   OTHER   Uses a Wheelchair/Scooter? Yes   Wheel 50 Feet with Two Turns   Was the activity attempted? Yes   Was the activity done independently? No   Assistance Needed Touching assistance;Verbal cues  (CGA/SBA to propel W/C 150 feet with BUE)   Type of Wheelchair/Scooter Manual   CARE Score - Wheel 50 Feet with Two Turns 4   Wheel 150 Feet   Was the activity attempted? Yes   Was the activity done independently? No   Assistance Needed Touching assistance;Verbal cues  (CGA/SBA to propel W/C 150 feet with BUE)   Type of Wheelchair/Scooter Manual   CARE Score - Wheel 150 Feet 4       AM-PAC 6 CLICK MOBILITY  18    Patient left up in chair with call button in reach and nursing staff notified.    GOALS:   Multidisciplinary Problems       Physical Therapy Goals          Problem: Physical Therapy    Goal Priority Disciplines Outcome Goal Variances Interventions   Physical Therapy Goal     PT, PT/OT Adequate for Care Transition     Description: Goals to be met by: 24     Patient will increase functional independence with mobility by performin. Supine  to sit with Supervision - MET 1/22/24  2. Sit to supine with Supervision - MET 1/22/24  3. Rolling to Left and Right with Supervision - MET 1/22/24  4. Sit to stand transfer with Stand-by Assistance - not met, CGA for safety  5. Bed to chair transfer with Stand-by Assistance using Rolling Walker - not met, CGA for safety  6. Gait  x 150 feet with Stand-by Assistance using Rolling Walker - not met, CGA for safety  7. Wheelchair propulsion x 150 feet with Modified Patrick using bilateral upper extremities - not met, CGA/SBA for safety secondary to visual deficits  8. New Goal 1/8/24: Ascend/descend 4 inch curb step using RW with SBA - not met, CGA for safety  9. New Goal 1/8/24: Ascend/descend 4 steps using BHR with SBA (progress number of steps as tolerated) - not met, CGA for safety    Rehab Services' DME recommendations    Wheelchair  Number of hours up in a wheelchair per day 8      Style Light weight    Justification for light weight w/c: patient cannot propel in a standard wheelchair, patient can and does self-propel in a lightweight wheelchair, patient has impaired ability to participate in MRADLs, mobility limitations cannot be sifficiently resolved with a cane/walker, the home provides adequate access between rooms for a wheelchair, a wheelchair will significantly improve the ability to participate in MRADLs and will be used in the home on a regular basis, the patient is willing to use a wheelchair in the home, the patient has a caregiver who is available, willing, and able to provide assistance with the wheelchair, and the patient requires additional person for safety with mobility with walker  Seat Width 18 (Standard adult)  Seat Depth 18  Back Height Standard  Leg Support Standard, Heel Loops, and Swing Away  Arm Height Desk and Swing Away  Lap Belt Buckle  Accessories Anti-tippers and Safety belt  Cushion Basic  Justification for Cushion Skin Integrity    Transport Chair    3 in 1 commode  Standard     Justification for 3 in 1 commode: The patient's deficits will not be sufficiently addressed by a raised toilet seat                           Time Tracking:     PT Received On: 01/22/24  PT Start Time: 0840  PT Stop Time: 0925  PT Total Time (min): 45 min    Billable Minutes: Gait Training 20, Therapeutic Activity 15, and Therapeutic Exercise 10    Treatment Type: Treatment  PT/PTA: PT     Number of PTA visits since last PT visit: 0     01/22/2024

## 2024-01-22 NOTE — PLAN OF CARE
NURSING HOME ORDERS    01/22/2024  [facility]  [address] [place] [address]  Dept: [phone]  Loc: [phone]     Admit to Nursing Home:  [facility] on 1/23/24    Diagnoses:  Active Hospital Problems    Diagnosis  POA    *Weakness [R53.1]  Yes    Severe malnutrition [E43]  Yes    DDD (degenerative disc disease), thoracolumbar [M51.35]  Yes    Urinary retention [R33.9]  Yes    Primary hypertension [I10]  Yes    Blindness, one eye [H54.40]  Yes      Resolved Hospital Problems    Diagnosis Date Resolved POA    Constipation [K59.00] 01/22/2024 Yes       Patient is homebound due to:  Weakness    Allergies:  Review of patient's allergies indicates:   Allergen Reactions    Pcn [penicillins]        Vitals:  Routine    Diet: regular diet    Activities:   Activity as tolerated    Goals of Care Treatment Preferences:  Code Status: Full Code    Labs:  per protocol    Nursing Precautions:  Aspiration , Fall, and Pressure ulcer prevention    Consults:   PT, OT, ST, wound care, dietary    Miscellaneous Care: Ramirez Care: Empty Ramirez bag every shift.  Change Ramirez every month last changed  1/11, has urology appt 2/6 for void trial               Wound care: Bilateral buttocks - BID and PRN for soilage  Cleanse previous application of Triad using no-rinse foam or soap and water with washcloth.   Apply Triad   To correctly apply Triad   Always cleanse wound before applying Triad.  To remove Triad:   Use pH-balanced wound cleanser to soften Triad  Gently wipe without scrubbing  For complete removal, repeat as needed.     Gently spread Triad evenly over the area of application to the thickness of a dime.    Follow up Appt:  Discharge with Ramirez catheter per Urology instruction with follow-up appointment on 02/06. Start Macrobid 1 wk before his next voiding trial/appt on 2/6/24     Ambulatory referral placed for orthopedics if patient desires to  have his baker's cyst removed on RLE    Medications: Discontinue all previous medication orders, if any. See new list below.     Medication List        START taking these medications      acetaminophen 500 MG tablet  Commonly known as: TYLENOL  Take 2 tablets (1,000 mg total) by mouth every 8 (eight) hours as needed for Pain.     ascorbic acid (vitamin C) 500 MG tablet  Commonly known as: VITAMIN C  Take 1 tablet (500 mg total) by mouth 2 (two) times daily.     calcium carbonate 200 mg calcium (500 mg) chewable tablet  Commonly known as: TUMS  Take 1 tablet (500 mg total) by mouth 2 (two) times daily as needed for Heartburn.     calcium-vitamin D3 500 mg-5 mcg (200 unit) per tablet  Commonly known as: OS-WILLIE 500 + D3  Take 2 tablets by mouth once daily.  Start taking on: January 23, 2024     melatonin 3 mg tablet  Commonly known as: MELATIN  Take 2 tablets (6 mg total) by mouth nightly.     methocarbamoL 500 MG Tab  Commonly known as: ROBAXIN  Take 1 tablet (500 mg total) by mouth 3 (three) times daily as needed (muscle spasms).     mirtazapine 7.5 MG Tab  Commonly known as: REMERON  Take 1 tablet (7.5 mg total) by mouth every evening.     nitrofurantoin (macrocrystal-monohydrate) 100 MG capsule  Commonly known as: MACROBID  Take 1 capsule (100 mg total) by mouth 2 (two) times daily. for 14 days  Start taking on: February 12, 2024     senna-docusate 8.6-50 mg 8.6-50 mg per tablet  Commonly known as: PERICOLACE  Take 2 tablets by mouth 2 (two) times daily.     zinc sulfate 50 mg zinc (220 mg) capsule  Commonly known as: ZINCATE  Take 1 capsule (220 mg total) by mouth once daily.  Start taking on: January 23, 2024            CHANGE how you take these medications      polyethylene glycol 17 gram Pwpk  Commonly known as: GLYCOLAX  Take 17 g by mouth 2 (two) times daily.  What changed: when to take this     tamsulosin 0.4 mg Cap  Commonly known as: FLOMAX  Take 2 capsules (0.8 mg total) by mouth once daily.  Start taking  on: January 23, 2024  What changed: how much to take            CONTINUE taking these medications      aspirin 81 MG Chew  Take 81 mg by mouth once daily.     bisacodyL 10 mg Supp  Commonly known as: DULCOLAX  Place 1 suppository (10 mg total) rectally daily as needed (constipation).     finasteride 5 mg tablet  Commonly known as: PROSCAR  Take 5 mg by mouth once daily.            STOP taking these medications      amLODIPine 5 MG tablet  Commonly known as: NORVASC                Immunizations Administered as of 1/22/2024       No immunizations on file.              WAYNE CAPMOS, APRN  01/22/2024

## 2024-01-23 VITALS
DIASTOLIC BLOOD PRESSURE: 63 MMHG | TEMPERATURE: 98 F | OXYGEN SATURATION: 98 % | SYSTOLIC BLOOD PRESSURE: 127 MMHG | HEIGHT: 71 IN | WEIGHT: 129.63 LBS | HEART RATE: 68 BPM | BODY MASS INDEX: 18.15 KG/M2 | RESPIRATION RATE: 16 BRPM

## 2024-01-23 PROCEDURE — 86580 TB INTRADERMAL TEST: CPT | Performed by: FAMILY MEDICINE

## 2024-01-23 PROCEDURE — 25000003 PHARM REV CODE 250: Performed by: HOSPITALIST

## 2024-01-23 PROCEDURE — 63600175 PHARM REV CODE 636 W HCPCS: Performed by: FAMILY MEDICINE

## 2024-01-23 PROCEDURE — 25000003 PHARM REV CODE 250: Performed by: FAMILY MEDICINE

## 2024-01-23 PROCEDURE — 30200315 PPD INTRADERMAL TEST REV CODE 302: Performed by: FAMILY MEDICINE

## 2024-01-23 PROCEDURE — 25000003 PHARM REV CODE 250: Performed by: INTERNAL MEDICINE

## 2024-01-23 RX ORDER — NITROFURANTOIN 25; 75 MG/1; MG/1
100 CAPSULE ORAL 2 TIMES DAILY
Qty: 14 CAPSULE | Refills: 0
Start: 2024-01-30 | End: 2024-02-06

## 2024-01-23 RX ADMIN — POLYETHYLENE GLYCOL 3350 17 G: 17 POWDER, FOR SOLUTION ORAL at 09:01

## 2024-01-23 RX ADMIN — ENOXAPARIN SODIUM 30 MG: 30 INJECTION SUBCUTANEOUS at 04:01

## 2024-01-23 RX ADMIN — METHOCARBAMOL 500 MG: 500 TABLET ORAL at 02:01

## 2024-01-23 RX ADMIN — Medication 2 TABLET: at 09:01

## 2024-01-23 RX ADMIN — HYDROCODONE BITARTRATE AND ACETAMINOPHEN 1 TABLET: 5; 325 TABLET ORAL at 02:01

## 2024-01-23 RX ADMIN — SENNOSIDES AND DOCUSATE SODIUM 2 TABLET: 8.6; 5 TABLET ORAL at 09:01

## 2024-01-23 RX ADMIN — ACETAMINOPHEN 1000 MG: 500 TABLET ORAL at 02:01

## 2024-01-23 RX ADMIN — ACETAMINOPHEN 1000 MG: 500 TABLET ORAL at 05:01

## 2024-01-23 RX ADMIN — TAMSULOSIN HYDROCHLORIDE 0.8 MG: 0.4 CAPSULE ORAL at 09:01

## 2024-01-23 RX ADMIN — Medication 500 MG: at 09:01

## 2024-01-23 RX ADMIN — ASPIRIN 81 MG CHEWABLE TABLET 81 MG: 81 TABLET CHEWABLE at 09:01

## 2024-01-23 RX ADMIN — METHOCARBAMOL 500 MG: 500 TABLET ORAL at 09:01

## 2024-01-23 RX ADMIN — ZINC SULFATE 220 MG (50 MG) CAPSULE 220 MG: CAPSULE at 09:01

## 2024-01-23 RX ADMIN — FINASTERIDE 5 MG: 5 TABLET, FILM COATED ORAL at 09:01

## 2024-01-23 RX ADMIN — TUBERCULIN PURIFIED PROTEIN DERIVATIVE 5 UNITS: 5 INJECTION, SOLUTION INTRADERMAL at 12:01

## 2024-01-23 NOTE — CARE UPDATE
Pt is scheduled to discharge today around 1-2 pm to       [address] [address]   [phone]        Transportation will be through our [employer] company, paid for by the family.  Tanner Johnson is the son.    No HHA required.  No DME required.    Transfer nurse will be Randa at the number above.  Informed CN and US, Paulina.  Tammy Michel, Admissions Coordinator

## 2024-01-23 NOTE — CARE UPDATE
Contact called and left a message on readiness for transport today to [facility]  Informed Paulina and CN of transport ([facility]) and time  Pt will be transferred to [facility] this afternoon    [facility]  [address] [place] [address]   [phone]    Awaiting official approval, time and room number.    1053 a  Sent PPD placed and neg rapid covid    Awaiting rm number to set transportation up

## 2024-01-23 NOTE — DISCHARGE SUMMARY
[facility]   Skilled [facility] Facility   Discharge Summary  Patient Name: Dimitri Johnson  : 1933  MRN: [id number]  Attending Physician: Kang Green MD  Nurse Practitioner: SADIE GÓMEZ    Admit Date: 2023   Date of Discharge:    Length of Stay:  LOS: 28 days     Date of Service: 2024    Principal Problem: Weakness    HPI  Dimitri Johnson is a 89 y.o. male admitted to [facility]  with weakness and constipation. He had associated stomach discomfort that was relieved after having a large bowel movement. Daily miralax and prn dulcolax regimen started. Patient has decreased mobility due to his degenerative disc disease and deconditioning. He is hesitant to walk and stand up. He worked with PT and OT who both recommended moderate intensity therapy.      Patient will be treated at [facility] with PT and OT to improve functional status and ability to perform ADLs.     Hospital Course  Patient progressed well with PT and OT- last PT note states that patient ambulated >250 ft CGA with RW, pt mildly anxious about ambulating . Patient had no significant events during their stay at [facility]. Home health was set up. DME was ordered if needed. Follow up appointment to be made by patient within one week. All prescriptions and discharge instructions were ordered to be given to the patient prior to discharge.  Discharge with Ramirez catheter per Urology instruction with follow-up appointment on . Start Macrobid on  1 wk before his next voiding trial on 24. Communicated with [facility] to order this medication    Physical Exam  General: Alert, frail, thin, oriented x3 poor short-term memory and recall  HENT: Atraumatic, normocephalic, Right eye keteroplasty/blind, left eye poor vision, hard of hearing  CV: RRR; no murmurs, Normal s1, s2, no peripheral edema  Resp: CTAB with normal work of breathing and chest excursion on room air.   Abd: Soft, NTND. Bowel sounds  "normoactive. No external hemorrhoids or prolapse visible  MSK: Generalized weakness  :  +Ramirez cath  Neuro: Normal appearing coordination and sensory function.   Skin: No rashes or lesions noted, dry, warm, dressing to left ankle,  dermatitis sacrum  Psych: calm, cooperative, pleasant    Recent Labs   Lab 01/18/24 0414 01/22/24  0519   WBC 8.23 9.17   HGB 12.0* 12.0*   HCT 36.4* 37.2*    289     Recent Labs   Lab 01/18/24 0414 01/22/24  0519    141   K 4.4 4.7    110   CO2 24 23   BUN 40* 36*   CREATININE 1.2 1.2   GLU 83 74   CALCIUM 9.0 9.1   MG 2.3 2.1   PHOS 3.8 4.1     No results for input(s): "ALKPHOS", "ALT", "AST", "ALBUMIN", "PROT", "BILITOT", "INR" in the last 168 hours.   No results for input(s): "CPK", "CPKMB", "MB", "TROPONINI" in the last 72 hours.  No results for input(s): "LACTATE" in the last 72 hours.    No results for input(s): "POCTGLUCOSE" in the last 168 hours.   No results found for: "HGBA1C"      Current Discharge Medication List        START taking these medications    Details   acetaminophen (TYLENOL) 500 MG tablet Take 2 tablets (1,000 mg total) by mouth every 8 (eight) hours as needed for Pain.  Refills: 0      ascorbic acid, vitamin C, (VITAMIN C) 500 MG tablet Take 1 tablet (500 mg total) by mouth 2 (two) times daily.      calcium carbonate (TUMS) 200 mg calcium (500 mg) chewable tablet Take 1 tablet (500 mg total) by mouth 2 (two) times daily as needed for Heartburn.      calcium-vitamin D3 (OS-WILLIE 500 + D3) 500 mg-5 mcg (200 unit) per tablet Take 2 tablets by mouth once daily.  Qty: 60 tablet, Refills: 11      melatonin (MELATIN) 3 mg tablet Take 2 tablets (6 mg total) by mouth nightly.  Refills: 0      methocarbamoL (ROBAXIN) 500 MG Tab Take 1 tablet (500 mg total) by mouth 3 (three) times daily as needed (muscle spasms).  Qty: 30 tablet, Refills: 0      mirtazapine (REMERON) 7.5 MG Tab Take 1 tablet (7.5 mg total) by mouth every evening.  Qty: 30 tablet, " Refills: 11      nitrofurantoin, macrocrystal-monohydrate, (MACROBID) 100 MG capsule Take 1 capsule (100 mg total) by mouth 2 (two) times daily. for 14 days  Qty: 28 capsule, Refills: 0    Comments: Urology wants macrobid started 1 week prior to next outpatient appt which they recommend in 3-4 weeks      senna-docusate 8.6-50 mg (PERICOLACE) 8.6-50 mg per tablet Take 2 tablets by mouth 2 (two) times daily.      zinc sulfate (ZINCATE) 50 mg zinc (220 mg) capsule Take 1 capsule (220 mg total) by mouth once daily.           CONTINUE these medications which have CHANGED    Details   polyethylene glycol (GLYCOLAX) 17 gram PwPk Take 17 g by mouth 2 (two) times daily.  Refills: 0      tamsulosin (FLOMAX) 0.4 mg Cap Take 2 capsules (0.8 mg total) by mouth once daily.  Qty: 60 capsule, Refills: 11           CONTINUE these medications which have NOT CHANGED    Details   aspirin 81 MG Chew Take 81 mg by mouth once daily.      bisacodyL (DULCOLAX) 10 mg Supp Place 1 suppository (10 mg total) rectally daily as needed (constipation).  Qty: 10 suppository, Refills: 0      finasteride (PROSCAR) 5 mg tablet Take 5 mg by mouth once daily.           STOP taking these medications       amlodipine (NORVASC) 5 MG tablet Comments:   Reason for Stopping:               Discharge Diet:  Regular    Activity: activity as tolerated    Discharge Condition: Good     Disposition: Long Term Care    Future Appointments   Date Time Provider Department Center   1/30/2024  1:00 PM Giselle Velez PA-C [facility]   2/6/2024  9:00 AM Vinh Moon MD [facility] UROLOGY [facility]       36 minutes total time spent on the discharge of the patient including review of hospital course with the patient, reviewing discharge medications, and arranging follow-up care.      AMY L DEVINE, APRN [facility]

## 2024-01-24 NOTE — NURSING
Patient discharged to [facility], picked up vis w/c -van per hospital transportation. Pt is [id number], ndn, belongins provided.[facility]'s receiving nurse informed.

## 2024-01-30 ENCOUNTER — OFFICE VISIT (OUTPATIENT)
Dept: INTERNAL MEDICINE | Facility: CLINIC | Age: 89
End: 2024-01-30
Payer: MEDICARE

## 2024-01-30 VITALS
HEIGHT: 71 IN | OXYGEN SATURATION: 99 % | WEIGHT: 129.63 LBS | DIASTOLIC BLOOD PRESSURE: 76 MMHG | BODY MASS INDEX: 18.15 KG/M2 | SYSTOLIC BLOOD PRESSURE: 126 MMHG | HEART RATE: 72 BPM

## 2024-01-30 DIAGNOSIS — M51.35 DDD (DEGENERATIVE DISC DISEASE), THORACOLUMBAR: ICD-10-CM

## 2024-01-30 DIAGNOSIS — Z09 HOSPITAL DISCHARGE FOLLOW-UP: Primary | ICD-10-CM

## 2024-01-30 DIAGNOSIS — R53.81 PHYSICAL DEBILITY: ICD-10-CM

## 2024-01-30 DIAGNOSIS — R33.9 URINARY RETENTION: ICD-10-CM

## 2024-01-30 DIAGNOSIS — K59.00 CONSTIPATION, UNSPECIFIED CONSTIPATION TYPE: ICD-10-CM

## 2024-01-30 PROCEDURE — 1111F DSCHRG MED/CURRENT MED MERGE: CPT | Mod: CPTII,S$GLB,, | Performed by: PHYSICIAN ASSISTANT

## 2024-01-30 PROCEDURE — 3288F FALL RISK ASSESSMENT DOCD: CPT | Mod: CPTII,S$GLB,, | Performed by: PHYSICIAN ASSISTANT

## 2024-01-30 PROCEDURE — 99203 OFFICE O/P NEW LOW 30 MIN: CPT | Mod: S$GLB,,, | Performed by: PHYSICIAN ASSISTANT

## 2024-01-30 PROCEDURE — 1126F AMNT PAIN NOTED NONE PRSNT: CPT | Mod: CPTII,S$GLB,, | Performed by: PHYSICIAN ASSISTANT

## 2024-01-30 PROCEDURE — 1159F MED LIST DOCD IN RCRD: CPT | Mod: CPTII,S$GLB,, | Performed by: PHYSICIAN ASSISTANT

## 2024-01-30 PROCEDURE — 99999 PR PBB SHADOW E&M-EST. PATIENT-LVL IV: CPT | Mod: PBBFAC,,, | Performed by: PHYSICIAN ASSISTANT

## 2024-01-30 PROCEDURE — 1101F PT FALLS ASSESS-DOCD LE1/YR: CPT | Mod: CPTII,S$GLB,, | Performed by: PHYSICIAN ASSISTANT

## 2024-01-30 NOTE — PROGRESS NOTES
Subjective:       Patient ID: Dimitri Johnson is a 89 y.o. male.    Chief Complaint: Follow-up      Established pt of Everette Neville MD (new to me)    HPI          Here for hospital discharge follow after admission for urinary retention, constipation and weakness.   Home from [facility] one week ago.   Resident at [facility]   He is attended by elodia ochoa from [facility]  Dr. Alice Peacock Facility physician    He brings current orders/meds to clinic  He is without acute complaints or concerns  Repost he is having BM  Catheter is is place, has f/u schedule with Dr. Moon next week  He is working with PT  It unclear if he has a PCP.         Admit Date: 12/26/2023   Discharge: 01/23/2024    HPI  Dimitri Johnson is a 89 y.o. male admitted to [facility] 12/22 with weakness and constipation. He had associated stomach discomfort that was relieved after having a large bowel movement. Daily miralax and prn dulcolax regimen started. Patient has decreased mobility due to his degenerative disc disease and deconditioning. He is hesitant to walk and stand up. He worked with PT and OT who both recommended moderate intensity therapy.      Patient will be treated at [facility] with PT and OT to improve functional status and ability to perform ADLs.      Hospital Course  Patient progressed well with PT and OT- last PT note states that patient ambulated >250 ft CGA with RW, pt mildly anxious about ambulating . Patient had no significant events during their stay at SNF. Home health was set up. DME was ordered if needed. Follow up appointment to be made by patient within one week. All prescriptions and discharge instructions were ordered to be given to the patient prior to discharge.  Discharge with Ramirez catheter per Urology instruction with follow-up appointment on 02/06. Start Macrobid on 1/30 1 wk before his next voiding trial on 2/6/24. Communicated with [facility] to  order this medication      Past Medical History:   Diagnosis Date    Glaucoma     Hypertension        Social History     Tobacco Use    Smoking status: Never   Substance Use Topics    Alcohol use: Yes     Comment: once a month       Review of patient's allergies indicates:   Allergen Reactions    Pcn [penicillins]          Current Outpatient Medications:     acetaminophen (TYLENOL) 500 MG tablet, Take 2 tablets (1,000 mg total) by mouth every 8 (eight) hours as needed for Pain., Disp: , Rfl: 0    ascorbic acid, vitamin C, (VITAMIN C) 500 MG tablet, Take 1 tablet (500 mg total) by mouth 2 (two) times daily., Disp: , Rfl:     aspirin 81 MG Chew, Take 81 mg by mouth once daily., Disp: , Rfl:     bisacodyL (DULCOLAX) 10 mg Supp, Place 1 suppository (10 mg total) rectally daily as needed (constipation)., Disp: 10 suppository, Rfl: 0    calcium carbonate (TUMS) 200 mg calcium (500 mg) chewable tablet, Take 1 tablet (500 mg total) by mouth 2 (two) times daily as needed for Heartburn., Disp: , Rfl:     calcium-vitamin D3 (OS-WILLIE 500 + D3) 500 mg-5 mcg (200 unit) per tablet, Take 2 tablets by mouth once daily., Disp: 60 tablet, Rfl: 11    finasteride (PROSCAR) 5 mg tablet, Take 5 mg by mouth once daily., Disp: , Rfl:     melatonin (MELATIN) 3 mg tablet, Take 2 tablets (6 mg total) by mouth nightly., Disp: , Rfl: 0    methocarbamoL (ROBAXIN) 500 MG Tab, Take 1 tablet (500 mg total) by mouth 3 (three) times daily as needed (muscle spasms)., Disp: 30 tablet, Rfl: 0    mirtazapine (REMERON) 7.5 MG Tab, Take 1 tablet (7.5 mg total) by mouth every evening., Disp: 30 tablet, Rfl: 11    polyethylene glycol (GLYCOLAX) 17 gram PwPk, Take 17 g by mouth 2 (two) times daily., Disp: , Rfl: 0    tamsulosin (FLOMAX) 0.4 mg Cap, Take 2 capsules (0.8 mg total) by mouth once daily., Disp: 60 capsule, Rfl: 11    zinc sulfate (ZINCATE) 50 mg zinc (220 mg) capsule, Take 1 capsule (220 mg total) by mouth once daily., Disp: , Rfl:      "nitrofurantoin, macrocrystal-monohydrate, (MACROBID) 100 MG capsule, Take 1 capsule (100 mg total) by mouth 2 (two) times daily. for 7 days (Patient not taking: Reported on 1/30/2024), Disp: 14 capsule, Rfl: 0    senna-docusate 8.6-50 mg (PERICOLACE) 8.6-50 mg per tablet, Take 2 tablets by mouth 2 (two) times daily. (Patient not taking: Reported on 1/30/2024), Disp: , Rfl:     No family history on file.    Past Surgical History:   Procedure Laterality Date    CORNEAL TRANSPLANT Right     CORNEAL TRANSPLANT Left 7/17/14    [facility]       Review of Systems   Constitutional:  Negative for fever.   Respiratory:  Negative for shortness of breath.    Cardiovascular:  Negative for chest pain and palpitations.   Gastrointestinal:  Positive for constipation (improved). Negative for abdominal pain, nausea and vomiting.   Genitourinary:  Positive for difficulty urinating (has gray). Negative for dysuria and hematuria.   Neurological:  Positive for weakness (deconditioning).       Objective: ,/76 (BP Location: Right arm, Patient Position: Sitting, BP Method: Medium (Manual))   Pulse 72   Ht 5' 11" (1.803 m)   Wt 58.8 kg (129 lb 10.1 oz)   SpO2 99%   BMI 18.08 kg/m²         Physical Exam  Vitals reviewed.   Constitutional:       General: He is not in acute distress.     Appearance: He is well-developed and underweight.   HENT:      Head: Normocephalic and atraumatic.   Cardiovascular:      Rate and Rhythm: Normal rate and regular rhythm.      Heart sounds: No murmur heard.  Pulmonary:      Effort: Pulmonary effort is normal.      Breath sounds: Normal breath sounds. No wheezing or rales.   Abdominal:      General: Bowel sounds are normal.      Palpations: Abdomen is soft.      Tenderness: There is no abdominal tenderness.      Comments: Gray in place draining clear yellow urine   Musculoskeletal:      Right lower leg: No edema.      Left lower leg: No edema.      Comments: In wheelchair   Skin:     General: Skin is warm " and dry.      Findings: No rash.   Neurological:      Mental Status: He is alert. Mental status is at baseline.   Psychiatric:         Mood and Affect: Mood normal.         Behavior: Behavior is cooperative.         Assessment:       1. Hospital discharge follow-up    2. Urinary retention    3. DDD (degenerative disc disease), thoracolumbar    4. Constipation, unspecified constipation type    5. Physical debility        Plan:             Dimitri was seen today for follow-up.    Diagnoses and all orders for this visit:    Hospital discharge follow-up    Urinary retention    DDD (degenerative disc disease), thoracolumbar    Constipation, unspecified constipation type    Physical debility      Reviewed current facility orders (scanned to EPIC)  Agree to continue (form signed and returned to staff/)   No changes  Keep urology f/u  RTC prn    Future Appointments   Date Time Provider Department Center   2/6/2024  9:00 AM Vinh Moon MD [facility] UR[facility]Y [facility]

## 2024-02-06 ENCOUNTER — OFFICE VISIT (OUTPATIENT)
Dept: UROLOGY | Facility: CLINIC | Age: 89
End: 2024-02-06
Payer: MEDICARE

## 2024-02-06 VITALS
SYSTOLIC BLOOD PRESSURE: 153 MMHG | BODY MASS INDEX: 18.06 KG/M2 | HEART RATE: 73 BPM | WEIGHT: 129 LBS | DIASTOLIC BLOOD PRESSURE: 68 MMHG | HEIGHT: 71 IN

## 2024-02-06 DIAGNOSIS — N13.8 ENLARGED PROSTATE WITH URINARY OBSTRUCTION: ICD-10-CM

## 2024-02-06 DIAGNOSIS — M71.20 SYNOVIAL CYST OF POPLITEAL SPACE, UNSPECIFIED LATERALITY: ICD-10-CM

## 2024-02-06 DIAGNOSIS — R33.9 URINARY RETENTION: Primary | ICD-10-CM

## 2024-02-06 DIAGNOSIS — N40.1 ENLARGED PROSTATE WITH URINARY OBSTRUCTION: ICD-10-CM

## 2024-02-06 PROCEDURE — 99999 PR PBB SHADOW E&M-EST. PATIENT-LVL IV: CPT | Mod: PBBFAC,,, | Performed by: UROLOGY

## 2024-02-06 PROCEDURE — 3288F FALL RISK ASSESSMENT DOCD: CPT | Mod: CPTII,S$GLB,, | Performed by: UROLOGY

## 2024-02-06 PROCEDURE — 51700 IRRIGATION OF BLADDER: CPT | Mod: S$GLB,,, | Performed by: UROLOGY

## 2024-02-06 PROCEDURE — 99214 OFFICE O/P EST MOD 30 MIN: CPT | Mod: 25,S$GLB,, | Performed by: UROLOGY

## 2024-02-06 PROCEDURE — 1159F MED LIST DOCD IN RCRD: CPT | Mod: CPTII,S$GLB,, | Performed by: UROLOGY

## 2024-02-06 PROCEDURE — 1101F PT FALLS ASSESS-DOCD LE1/YR: CPT | Mod: CPTII,S$GLB,, | Performed by: UROLOGY

## 2024-02-06 PROCEDURE — 1111F DSCHRG MED/CURRENT MED MERGE: CPT | Mod: CPTII,S$GLB,, | Performed by: UROLOGY

## 2024-02-06 PROCEDURE — 1126F AMNT PAIN NOTED NONE PRSNT: CPT | Mod: CPTII,S$GLB,, | Performed by: UROLOGY

## 2024-02-06 RX ORDER — GABAPENTIN 100 MG/1
100 CAPSULE ORAL 2 TIMES DAILY
COMMUNITY
Start: 2024-01-29

## 2024-02-06 NOTE — PATIENT INSTRUCTIONS
Pt passed a voiding trial today.  Ramirez catheter was removed and he was able to void spontaneously.  Continue flomax and finasteride for a long term.

## 2024-02-06 NOTE — PROGRESS NOTES
CC: recurrent urinary retetnion    Dimitri Johnson is a 89 y.o. man who is here for the evaluation of Other (VT)    A new pt referred by his PCP, Everette Neville MD   He came today by a transportation from [facility].    Mr Mosley was admitted with weakness and constipation. He had associated stomach discomfort that was relieved after having a large bowel movement. Daily miralax and prn dulcolax regimen started. Patient has decreased mobility due to his degenerative disc disease and deconditioning. He is hesitant to walk and stand up. His strength is intact. He worked with PT and OT who both recommended moderate intensity therapy. Discussed SNF therapy with Mr Mosley and he has been in agreement to go.  Updated son by voicemail, as I have been unable ot reach by phone since the second day of admission. Patient to be discharged to [facility] this evening.       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  Lives with his son.  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    Past Medical History:   Diagnosis Date    Glaucoma     Hypertension      Past Surgical History:   Procedure Laterality Date    CORNEAL TRANSPLANT Right     CORNEAL TRANSPLANT Left 7/17/14    [facility]     Social History     Tobacco Use    Smoking status: Never   Substance Use Topics    Alcohol use: Yes     Comment: once a month     No family history on file.  Allergy:  Review of patient's allergies indicates:   Allergen Reactions    Pcn [penicillins]      Outpatient Encounter Medications as of 2/6/2024   Medication Sig Dispense Refill    acetaminophen (TYLENOL) 500 MG tablet Take 2 tablets (1,000 mg total) by mouth every 8 (eight) hours as needed for Pain.  0    ascorbic acid, vitamin C, (VITAMIN C) 500 MG tablet Take 1 tablet (500 mg total) by mouth 2 (two) times daily.      aspirin 81 MG Chew Take 81 mg by mouth once daily.      bisacodyL (DULCOLAX) 10 mg Supp Place 1 suppository (10 mg total) rectally daily as needed  (constipation). 10 suppository 0    calcium carbonate (TUMS) 200 mg calcium (500 mg) chewable tablet Take 1 tablet (500 mg total) by mouth 2 (two) times daily as needed for Heartburn.      calcium-vitamin D3 (OS-WILLIE 500 + D3) 500 mg-5 mcg (200 unit) per tablet Take 2 tablets by mouth once daily. 60 tablet 11    finasteride (PROSCAR) 5 mg tablet Take 5 mg by mouth once daily.      gabapentin (NEURONTIN) 100 MG capsule Take 100 mg by mouth 2 (two) times daily.      melatonin (MELATIN) 3 mg tablet Take 2 tablets (6 mg total) by mouth nightly.  0    mirtazapine (REMERON) 7.5 MG Tab Take 1 tablet (7.5 mg total) by mouth every evening. 30 tablet 11    polyethylene glycol (GLYCOLAX) 17 gram PwPk Take 17 g by mouth 2 (two) times daily.  0    senna-docusate 8.6-50 mg (PERICOLACE) 8.6-50 mg per tablet Take 2 tablets by mouth 2 (two) times daily.      tamsulosin (FLOMAX) 0.4 mg Cap Take 2 capsules (0.8 mg total) by mouth once daily. 60 capsule 11    zinc sulfate (ZINCATE) 50 mg zinc (220 mg) capsule Take 1 capsule (220 mg total) by mouth once daily.      [] methocarbamoL (ROBAXIN) 500 MG Tab Take 1 tablet (500 mg total) by mouth 3 (three) times daily as needed (muscle spasms). 30 tablet 0    nitrofurantoin, macrocrystal-monohydrate, (MACROBID) 100 MG capsule Take 1 capsule (100 mg total) by mouth 2 (two) times daily. for 7 days (Patient not taking: Reported on 2024) 14 capsule 0    [DISCONTINUED] amlodipine (NORVASC) 5 MG tablet Take 5 mg by mouth 2 (two) times daily.      [DISCONTINUED] nitrofurantoin, macrocrystal-monohydrate, (MACROBID) 100 MG capsule Take 1 capsule (100 mg total) by mouth 2 (two) times daily. Start Macrobid 7 days before his next visit prior to voiding trial for 10 days 20 capsule 0    [DISCONTINUED] nitrofurantoin, macrocrystal-monohydrate, (MACROBID) 100 MG capsule Take 1 capsule (100 mg total) by mouth 2 (two) times daily. for 14 days 28 capsule 0    [DISCONTINUED] polyethylene glycol  (GLYCOLAX) 17 gram PwPk Take 17 g by mouth once daily. 30 each 0    [DISCONTINUED] tamsulosin (FLOMAX) 0.4 mg Cap Take 1 capsule (0.4 mg total) by mouth once daily. 1 capsule 0    [] oseltamivir capsule 30 mg       [] sodium bicarbonate tablet 650 mg       [DISCONTINUED] acetaminophen tablet 1,000 mg       [DISCONTINUED] ascorbic acid (vitamin C) tablet 500 mg       [DISCONTINUED] aspirin chewable tablet 81 mg       [DISCONTINUED] bisacodyL suppository 10 mg       [DISCONTINUED] calcium carbonate 200 mg calcium (500 mg) chewable tablet 500 mg       [DISCONTINUED] calcium-vitamin D3 500 mg-5 mcg (200 unit) per tablet 2 tablet       [DISCONTINUED] enoxaparin injection 30 mg       [DISCONTINUED] finasteride tablet 5 mg       [DISCONTINUED] hydrALAZINE tablet 50 mg       [DISCONTINUED] HYDROcodone-acetaminophen 5-325 mg per tablet 1 tablet       [DISCONTINUED] melatonin tablet 6 mg       [DISCONTINUED] methocarbamoL tablet 500 mg       [DISCONTINUED] mirtazapine tablet 7.5 mg       [DISCONTINUED] polyethylene glycol packet 17 g       [DISCONTINUED] senna-docusate 8.6-50 mg per tablet 2 tablet       [DISCONTINUED] tamsulosin 24 hr capsule 0.8 mg       [DISCONTINUED] zinc sulfate capsule 220 mg        No facility-administered encounter medications on file as of 2024.     Review of Systems   ROS  Physical Exam     Vitals:    24 0854   BP: (!) 153/68   Pulse: 73     Physical Exam  Constitutional:       General: He is not in acute distress.     Appearance: He is well-developed. He is not diaphoretic.   HENT:      Head: Normocephalic and atraumatic.      Right Ear: External ear normal.      Left Ear: External ear normal.      Nose: Nose normal.   Eyes:      Conjunctiva/sclera: Conjunctivae normal.      Pupils: Pupils are equal, round, and reactive to light.   Neck:      Thyroid: No thyromegaly.      Vascular: No JVD.      Trachea: No tracheal deviation.   Cardiovascular:      Rate and Rhythm: Normal  "rate and regular rhythm.      Heart sounds: Normal heart sounds. No murmur heard.     No friction rub. No gallop.   Pulmonary:      Effort: Pulmonary effort is normal. No respiratory distress.      Breath sounds: Normal breath sounds. No wheezing.   Chest:      Chest wall: No tenderness.   Abdominal:      General: Bowel sounds are normal. There is no distension.      Palpations: Abdomen is soft. There is no mass.      Tenderness: There is no abdominal tenderness. There is no guarding or rebound.   Genitourinary:     Penis: Normal. No tenderness.       Rectum: Normal.      Comments: Ramirez catheter in place with clear urine.  Musculoskeletal:         General: No tenderness or deformity. Normal range of motion.      Cervical back: Normal range of motion and neck supple.   Lymphadenopathy:      Cervical: No cervical adenopathy.   Skin:     General: Skin is warm and dry.   Neurological:      Mental Status: He is alert and oriented to person, place, and time.   Psychiatric:         Behavior: Behavior normal.         Thought Content: Thought content normal.       Genitalia:  Scrotum: no rash or lesion  Normal symmetric epididymis without masses  Normal vas palpated  Normal size, symmetric testicles with no masses   Normal urethral meatus with no discharge  Normal circumcised penis with no lesion   Rectal:  Normal perineum and anus upon inspection.  Normal tone, no masses or tenderness;     LABS:  No results found for: "PSA", "PSADIAG", "PSATOTAL", "PSAFREE", "PSAFREEPCT"  No results found for this or any previous visit.  Lab Results   Component Value Date    CREATININE 1.2 01/22/2024    CREATININE 1.2 01/18/2024    CREATININE 1.2 01/15/2024     No results found for this or any previous visit.  Urine Culture, Routine   Date Value Ref Range Status   12/29/2023 (A)  Final    COAGULASE-NEGATIVE STAPHYLOCOCCUS SPECIES  10,000 - 49,999 cfu/ml  Susceptibility testing not routinely performed.       No results found for: " ""HGBA1C"    Radiology:  CT 12/22/23  Limited examination. Moderately large liquid stool within the patulous rectum.  Question possible diarrheal illness and/or fecal impaction.     Left renal cyst.     Prostatomegaly.     Osseous changes particularly of the spine.    Assessment and Plan:  Dimitri was seen today for other.    Diagnoses and all orders for this visit:    Urinary retention    Enlarged prostate with urinary obstruction  -     Ambulatory referral/consult to Urology    Synovial cyst of popliteal space, unspecified laterality  -     Ambulatory referral/consult to Urology    He has an enlarged prostate with obstruction.  He failed a voiding trial on 1/11/24 and a new gray catheter is in place.    Voiding Trial is done.  200 cc sterile water instilled in the bladder.  Gray catheter removed.  Patient was able to void spontaneously.     Hopefully he will continue to void better.  Continue flomax and finasteride for a long term.  Control constipation, which will help him with recurrent urinary retention.    I explained all these to pt, his care taker and daughter who came with him today.   pt does have a memory problem.    Follow-up:  Follow up if symptoms worsen or fail to improve.  "

## 2024-03-08 ENCOUNTER — TELEPHONE (OUTPATIENT)
Dept: UROLOGY | Facility: CLINIC | Age: 89
End: 2024-03-08
Payer: MEDICARE

## 2024-03-08 NOTE — TELEPHONE ENCOUNTER
Appointment Access, Pt Advice, Same Day Access Requested  [facility] Staff  Caller: Rosenda [phone] (Today,  3:43 PM)  Rosenda/Jocelyn Naik calling to schedule f/u appt. States pt has cath. Attempted to schedule. Please call [phone]

## 2024-03-08 NOTE — TELEPHONE ENCOUNTER
Spoke to khushboo, she states she is the [employer]jada clerk at the nursing home. She states he does not have a gray, he is not having issues; they just wanted to know if he needs to follow up with  Dr hagen and  if so when. I told her that we did not need to see him, unless he was having trouble urinating

## 2024-03-08 NOTE — TELEPHONE ENCOUNTER
----- Message from Deepti Meyers LPN sent at 3/5/2024  4:38 PM CST -----  Regarding: FW: Appointment  Contact: Noy jolly/[facility] [phone]  I spoke with noy, I asked if patient was unable to urinate, she said yes. I advised her to send him to er. She asked if we had an appt with dr hagen before June. I explained that someone would call her Thursday about an appt (dr hagen;s nurse is out today and tomorrow), but if pt is unable to urinate, he should go to er. She said ok and hung up the phone  ----- Message -----  From: Estefania Naranjo  Sent: 3/5/2024   4:24 PM CST  To: [facility] Staff  Subject: Appointment                                      Calling to schedule an appointment per urine retention as soon as possible. Please call patient to schedule today.

## 2024-03-15 ENCOUNTER — TELEPHONE (OUTPATIENT)
Dept: UROLOGY | Facility: CLINIC | Age: 89
End: 2024-03-15
Payer: MEDICARE

## 2024-03-15 NOTE — TELEPHONE ENCOUNTER
----- Message from Grecia Blanc sent at 3/15/2024  2:40 PM CDT -----  Regarding: Appt  Contact: [phone]  Marina from [facility]  calling to speak with someone in provider office regarding scheduling an appt for urinary retention. No appt available. Please call  back at  [phone]

## 2024-03-15 NOTE — TELEPHONE ENCOUNTER
I called and was transferred twice to a voice mail that was full. If the patient needs an office visit for not emptying his bladder. Please offer an erica't with one of our nurse practitioners

## 2024-03-18 ENCOUNTER — TELEPHONE (OUTPATIENT)
Dept: UROLOGY | Facility: CLINIC | Age: 89
End: 2024-03-18
Payer: MEDICARE

## 2024-03-18 NOTE — TELEPHONE ENCOUNTER
----- Message from Mrianda Solorzano LPN sent at 3/18/2024 10:24 AM CDT -----    ----- Message -----  From: Nichole Bain  Sent: 3/18/2024   9:51 AM CDT  To: [facility] Staff    Name of Who is Calling: ODALYS WHITAKER [[id number]] Marina ( [facility] )       What is the request in detail: requesting to schedule patient for urine retention appt. Next appt is too far in June. Please advise       Can the clinic reply by [facility]: NO       What Number to Call Back [facility] not [facility]: [phone]

## 2024-04-01 ENCOUNTER — OFFICE VISIT (OUTPATIENT)
Dept: UROLOGY | Facility: CLINIC | Age: 89
End: 2024-04-01
Payer: MEDICARE

## 2024-04-01 VITALS
SYSTOLIC BLOOD PRESSURE: 112 MMHG | BODY MASS INDEX: 23.46 KG/M2 | HEIGHT: 71 IN | HEART RATE: 64 BPM | WEIGHT: 167.56 LBS | DIASTOLIC BLOOD PRESSURE: 81 MMHG

## 2024-04-01 DIAGNOSIS — N40.1 ENLARGED PROSTATE WITH URINARY OBSTRUCTION: Primary | ICD-10-CM

## 2024-04-01 DIAGNOSIS — Z96.0 INDWELLING CATHETER PRESENT ON ADMISSION: ICD-10-CM

## 2024-04-01 DIAGNOSIS — R33.9 URINARY RETENTION: ICD-10-CM

## 2024-04-01 DIAGNOSIS — N13.8 ENLARGED PROSTATE WITH URINARY OBSTRUCTION: Primary | ICD-10-CM

## 2024-04-01 PROCEDURE — 51702 INSERT TEMP BLADDER CATH: CPT | Mod: S$GLB,,,

## 2024-04-01 PROCEDURE — 1160F RVW MEDS BY RX/DR IN RCRD: CPT | Mod: CPTII,S$GLB,,

## 2024-04-01 PROCEDURE — 99999 PR PBB SHADOW E&M-EST. PATIENT-LVL III: CPT | Mod: PBBFAC,,,

## 2024-04-01 PROCEDURE — 1126F AMNT PAIN NOTED NONE PRSNT: CPT | Mod: CPTII,S$GLB,,

## 2024-04-01 PROCEDURE — 99214 OFFICE O/P EST MOD 30 MIN: CPT | Mod: 25,S$GLB,,

## 2024-04-01 PROCEDURE — 3288F FALL RISK ASSESSMENT DOCD: CPT | Mod: CPTII,S$GLB,,

## 2024-04-01 PROCEDURE — 1159F MED LIST DOCD IN RCRD: CPT | Mod: CPTII,S$GLB,,

## 2024-04-01 PROCEDURE — 1101F PT FALLS ASSESS-DOCD LE1/YR: CPT | Mod: CPTII,S$GLB,,

## 2024-04-01 NOTE — PROGRESS NOTES
CHIEF COMPLAINT:  Urinary retention       HISTORY OF PRESENTING ILLINESS:  Dimitri Johnson is a 89 y.o. male established patient of Dr. Moon. He was seen in office 2/6/2024, gray catheter was removed in clinic and passed a voiding trial at that time. A catheter was replaced 3/2/2024 due to inability to urinate. Presents today for monthly catheter change. Patient does not want Rezum, states he prefers to manage urinary retention with gray catheter. Current resident of [facility].         REVIEW OF SYSTEMS:  Review of Systems   Constitutional:  Negative for chills and fever.   HENT:  Negative for congestion and sore throat.    Respiratory:  Negative for cough and shortness of breath.    Cardiovascular:  Negative for chest pain and palpitations.   Gastrointestinal:  Negative for nausea and vomiting.   Genitourinary:  Negative for flank pain and hematuria.        Gray catheter present   Neurological:  Positive for weakness. Negative for dizziness and headaches.         PATIENT HISTORY:    Past Medical History:   Diagnosis Date    Glaucoma     Hypertension        Past Surgical History:   Procedure Laterality Date    CORNEAL TRANSPLANT Right     CORNEAL TRANSPLANT Left 7/17/14    [facility]       History reviewed. No pertinent family history.    Social History     Socioeconomic History    Marital status:    Tobacco Use    Smoking status: Never   Substance and Sexual Activity    Alcohol use: Yes     Comment: once a month     Social Determinants of Health     Financial Resource Strain: Low Risk  (12/23/2023)    Overall Financial Resource Strain (CARDIA)     Difficulty of Paying Living Expenses: Not hard at all   Food Insecurity: No Food Insecurity (12/23/2023)    Hunger Vital Sign     Worried About Running Out of Food in the Last Year: Never true     Ran Out of Food in the Last Year: Never true   Transportation Needs: No Transportation Needs (12/23/2023)    PRAPARE - Transportation     Lack of Transportation (Medical):  No     Lack of Transportation (Non-Medical): No   Physical Activity: Inactive (12/23/2023)    Exercise Vital Sign     Days of Exercise per Week: 0 days     Minutes of Exercise per Session: 0 min   Stress: No Stress Concern Present (12/23/2023)    [employer] [place] [employer] - Occupational Stress Questionnaire     Feeling of Stress : Only a little   Social Connections: Socially Isolated (12/23/2023)    Social Connection and Isolation Panel [NHANES]     Frequency of Communication with Friends and Family: Three times a week     Frequency of Social Gatherings with Friends and Family: Three times a week     Attends Restorationist Services: Never     Active Member of Clubs or Organizations: No     Attends Club or Organization Meetings: Never     Marital Status:    Housing Stability: Unknown (12/23/2023)    Housing Stability Vital Sign     Unable to Pay for Housing in the Last Year: No     Unstable Housing in the Last Year: No       Allergies:  Pcn [penicillins]    Medications:    Current Outpatient Medications:     acetaminophen (TYLENOL) 500 MG tablet, Take 2 tablets (1,000 mg total) by mouth every 8 (eight) hours as needed for Pain., Disp: , Rfl: 0    ascorbic acid, vitamin C, (VITAMIN C) 500 MG tablet, Take 1 tablet (500 mg total) by mouth 2 (two) times daily., Disp: , Rfl:     aspirin 81 MG Chew, Take 81 mg by mouth once daily., Disp: , Rfl:     bisacodyL (DULCOLAX) 10 mg Supp, Place 1 suppository (10 mg total) rectally daily as needed (constipation)., Disp: 10 suppository, Rfl: 0    calcium carbonate (TUMS) 200 mg calcium (500 mg) chewable tablet, Take 1 tablet (500 mg total) by mouth 2 (two) times daily as needed for Heartburn., Disp: , Rfl:     calcium-vitamin D3 (OS-WILLIE 500 + D3) 500 mg-5 mcg (200 unit) per tablet, Take 2 tablets by mouth once daily., Disp: 60 tablet, Rfl: 11    finasteride (PROSCAR) 5 mg tablet, Take 5 mg by mouth once daily., Disp: , Rfl:     gabapentin (NEURONTIN) 100 MG  capsule, Take 100 mg by mouth 2 (two) times daily., Disp: , Rfl:     melatonin (MELATIN) 3 mg tablet, Take 2 tablets (6 mg total) by mouth nightly., Disp: , Rfl: 0    mirtazapine (REMERON) 7.5 MG Tab, Take 1 tablet (7.5 mg total) by mouth every evening., Disp: 30 tablet, Rfl: 11    polyethylene glycol (GLYCOLAX) 17 gram PwPk, Take 17 g by mouth 2 (two) times daily., Disp: , Rfl: 0    senna-docusate 8.6-50 mg (PERICOLACE) 8.6-50 mg per tablet, Take 2 tablets by mouth 2 (two) times daily., Disp: , Rfl:     tamsulosin (FLOMAX) 0.4 mg Cap, Take 2 capsules (0.8 mg total) by mouth once daily., Disp: 60 capsule, Rfl: 11    zinc sulfate (ZINCATE) 50 mg zinc (220 mg) capsule, Take 1 capsule (220 mg total) by mouth once daily., Disp: , Rfl:     PHYSICAL EXAMINATION:  Physical Exam  Constitutional:       Appearance: Normal appearance.   HENT:      Head: Normocephalic and atraumatic.      Right Ear: External ear normal.      Left Ear: External ear normal.      Nose: Nose normal.      Mouth/Throat:      Mouth: Mucous membranes are moist.   Pulmonary:      Effort: Pulmonary effort is normal. No respiratory distress.   Musculoskeletal:      Comments: weakness   Skin:     General: Skin is warm and dry.   Neurological:      Mental Status: He is alert. Mental status is at baseline.   Psychiatric:         Mood and Affect: Mood normal.             Lab Results   Component Value Date    CREATININE 1.2 2024    EGFRNORACEVR 57.4 (A) 2024             IMPRESSION:    Encounter Diagnoses   Name Primary?    Enlarged prostate with urinary obstruction Yes    Indwelling catheter present on admission     Urinary retention          Assessment:       1. Enlarged prostate with urinary obstruction    2. Indwelling catheter present on admission    3. Urinary retention        Plan:   Nurse Derrell identified using two identifiers (name and ). Allergies checked. Ramirez cath currently in place was discontinued by deflating ballon using 10cc  syringe. Genitalia prepped and draped in a sterile fashion. 10cc of 2% lidocaine jelly used for local analgesia.16fr catheter placed without difficulty. Ballon inflated with 10cc of sterile water. Patient tolerated procedure without complication. He was instructed on catheter care and escorted to lobby in wheelchair.      Patient needs monthly 16 fr gray catheter changes. [facility] staff at [facility] can perform monthly catheter changes.     I spent 30 minutes with the patient of which more than half was spent in direct consultation with the patient in regards to our treatment and plan.  We addressed the office findings and recent labs; any need to go ER today.   Education and recommendations of today's plan of care including home remedies and needed follow up with PCP.   We discussed the chief complaints; reviewed the LUTS and the possible contributory factors.

## 2024-05-13 ENCOUNTER — OFFICE VISIT (OUTPATIENT)
Dept: UROLOGY | Facility: CLINIC | Age: 89
End: 2024-05-13
Payer: MEDICARE

## 2024-05-13 VITALS
SYSTOLIC BLOOD PRESSURE: 132 MMHG | WEIGHT: 165 LBS | HEART RATE: 65 BPM | HEIGHT: 70 IN | DIASTOLIC BLOOD PRESSURE: 51 MMHG | BODY MASS INDEX: 23.62 KG/M2

## 2024-05-13 DIAGNOSIS — Z96.0 INDWELLING CATHETER PRESENT ON ADMISSION: ICD-10-CM

## 2024-05-13 DIAGNOSIS — N13.8 ENLARGED PROSTATE WITH URINARY OBSTRUCTION: Primary | ICD-10-CM

## 2024-05-13 DIAGNOSIS — N40.1 ENLARGED PROSTATE WITH URINARY OBSTRUCTION: Primary | ICD-10-CM

## 2024-05-13 PROCEDURE — 1126F AMNT PAIN NOTED NONE PRSNT: CPT | Mod: CPTII,S$GLB,,

## 2024-05-13 PROCEDURE — 99499 UNLISTED E&M SERVICE: CPT | Mod: S$GLB,,,

## 2024-05-13 PROCEDURE — 1160F RVW MEDS BY RX/DR IN RCRD: CPT | Mod: CPTII,S$GLB,,

## 2024-05-13 PROCEDURE — 51702 INSERT TEMP BLADDER CATH: CPT | Mod: S$GLB,,,

## 2024-05-13 PROCEDURE — 1159F MED LIST DOCD IN RCRD: CPT | Mod: CPTII,S$GLB,,

## 2024-05-13 PROCEDURE — 99999 PR PBB SHADOW E&M-EST. PATIENT-LVL III: CPT | Mod: PBBFAC,,,

## 2024-05-13 NOTE — PROGRESS NOTES
CHIEF COMPLAINT:  Catheter change      HISTORY OF PRESENTING ILLINESS:  Dimitri Johnson is a 89 y.o. male established patient of Dr. Moon. He was seen in office 4/1/2024    A catheter was replaced 3/2/2024 due to inability to urinate. Presents today for monthly catheter change. Patient does not want Rezum, states he prefers to manage urinary retention with gray catheter. Current resident of [facility].          REVIEW OF SYSTEMS:  Review of Systems   Constitutional:  Negative for chills and fever.   HENT:  Negative for congestion and sore throat.    Respiratory:  Negative for cough and shortness of breath.    Cardiovascular:  Negative for chest pain and palpitations.   Gastrointestinal:  Negative for nausea and vomiting.   Genitourinary:  Negative for flank pain and hematuria.        Gray catheter present   Neurological:  Positive for weakness. Negative for dizziness and headaches.         PATIENT HISTORY:    Past Medical History:   Diagnosis Date    Glaucoma     Hypertension        Past Surgical History:   Procedure Laterality Date    CORNEAL TRANSPLANT Right     CORNEAL TRANSPLANT Left 7/17/14    [facility]       No family history on file.    Social History     Socioeconomic History    Marital status:    Tobacco Use    Smoking status: Never   Substance and Sexual Activity    Alcohol use: Yes     Comment: once a month     Social Determinants of Health     Financial Resource Strain: Low Risk  (12/23/2023)    Overall Financial Resource Strain (CARDIA)     Difficulty of Paying Living Expenses: Not hard at all   Food Insecurity: No Food Insecurity (12/23/2023)    Hunger Vital Sign     Worried About Running Out of Food in the Last Year: Never true     Ran Out of Food in the Last Year: Never true   Transportation Needs: No Transportation Needs (12/23/2023)    PRAPARE - Transportation     Lack of Transportation (Medical): No     Lack of Transportation (Non-Medical): No   Physical Activity: Inactive (12/23/2023)     Exercise Vital Sign     Days of Exercise per Week: 0 days     Minutes of Exercise per Session: 0 min   Stress: No Stress Concern Present (12/23/2023)    [employer] [place] [employer] - Occupational Stress Questionnaire     Feeling of Stress : Only a little   Housing Stability: Unknown (12/23/2023)    Housing Stability Vital Sign     Unable to Pay for Housing in the Last Year: No     Unstable Housing in the Last Year: No       Allergies:  Pcn [penicillins]    Medications:    Current Outpatient Medications:     acetaminophen (TYLENOL) 500 MG tablet, Take 2 tablets (1,000 mg total) by mouth every 8 (eight) hours as needed for Pain., Disp: , Rfl: 0    ascorbic acid, vitamin C, (VITAMIN C) 500 MG tablet, Take 1 tablet (500 mg total) by mouth 2 (two) times daily., Disp: , Rfl:     aspirin 81 MG Chew, Take 81 mg by mouth once daily., Disp: , Rfl:     bisacodyL (DULCOLAX) 10 mg Supp, Place 1 suppository (10 mg total) rectally daily as needed (constipation)., Disp: 10 suppository, Rfl: 0    calcium carbonate (TUMS) 200 mg calcium (500 mg) chewable tablet, Take 1 tablet (500 mg total) by mouth 2 (two) times daily as needed for Heartburn., Disp: , Rfl:     calcium-vitamin D3 (OS-WILLIE 500 + D3) 500 mg-5 mcg (200 unit) per tablet, Take 2 tablets by mouth once daily., Disp: 60 tablet, Rfl: 11    finasteride (PROSCAR) 5 mg tablet, Take 5 mg by mouth once daily., Disp: , Rfl:     gabapentin (NEURONTIN) 100 MG capsule, Take 100 mg by mouth 2 (two) times daily., Disp: , Rfl:     melatonin (MELATIN) 3 mg tablet, Take 2 tablets (6 mg total) by mouth nightly., Disp: , Rfl: 0    mirtazapine (REMERON) 7.5 MG Tab, Take 1 tablet (7.5 mg total) by mouth every evening., Disp: 30 tablet, Rfl: 11    polyethylene glycol (GLYCOLAX) 17 gram PwPk, Take 17 g by mouth 2 (two) times daily., Disp: , Rfl: 0    senna-docusate 8.6-50 mg (PERICOLACE) 8.6-50 mg per tablet, Take 2 tablets by mouth 2 (two) times daily., Disp: , Rfl:     tamsulosin  (FLOMAX) 0.4 mg Cap, Take 2 capsules (0.8 mg total) by mouth once daily., Disp: 60 capsule, Rfl: 11    zinc sulfate (ZINCATE) 50 mg zinc (220 mg) capsule, Take 1 capsule (220 mg total) by mouth once daily., Disp: , Rfl:     PHYSICAL EXAMINATION:  Physical Exam  Constitutional:       Appearance: Normal appearance.   HENT:      Head: Normocephalic and atraumatic.      Right Ear: External ear normal.      Left Ear: External ear normal.      Nose: Nose normal.      Mouth/Throat:      Mouth: Mucous membranes are moist.   Pulmonary:      Effort: Pulmonary effort is normal. No respiratory distress.   Musculoskeletal:      Comments: weakness   Skin:     General: Skin is warm and dry.   Neurological:      Mental Status: He is alert. Mental status is at baseline.   Psychiatric:         Mood and Affect: Mood normal.         Lab Results   Component Value Date    CREATININE 1.2 2024    EGFRNORACEVR 57.4 (A) 2024               IMPRESSION:    Encounter Diagnoses   Name Primary?    Enlarged prostate with urinary obstruction Yes    Indwelling catheter present on admission          Assessment:       1. Enlarged prostate with urinary obstruction    2. Indwelling catheter present on admission        Plan:   Nurse Derrell identified using two identifiers (name and ). Allergies checked. Ramirez cath currently in place was discontinued by deflating ballon using 10cc syringe. Genitalia prepped and draped in a sterile fashion. 10cc of 2% lidocaine jelly used for local analgesia.16fr catheter placed without difficulty. Ballon inflated with 10cc of sterile water. Patient tolerated procedure without complication. He was instructed on catheter care and escorted to lobby in wheelchair.       RTC 1 month for catheter change     I spent 30 minutes with the patient of which more than half was spent in direct consultation with the patient in regards to our treatment and plan.  We addressed the office findings and recent labs; any need to  go ER today.   Education and recommendations of today's plan of care including home remedies and needed follow up with PCP.   We discussed the chief complaints; reviewed the LUTS and the possible contributory factors.

## 2024-06-13 ENCOUNTER — OFFICE VISIT (OUTPATIENT)
Dept: UROLOGY | Facility: CLINIC | Age: 89
End: 2024-06-13
Payer: MEDICARE

## 2024-06-13 VITALS — DIASTOLIC BLOOD PRESSURE: 69 MMHG | HEART RATE: 68 BPM | SYSTOLIC BLOOD PRESSURE: 135 MMHG

## 2024-06-13 DIAGNOSIS — N13.8 ENLARGED PROSTATE WITH URINARY OBSTRUCTION: Primary | ICD-10-CM

## 2024-06-13 DIAGNOSIS — N40.1 ENLARGED PROSTATE WITH URINARY OBSTRUCTION: Primary | ICD-10-CM

## 2024-06-13 DIAGNOSIS — Z96.0 INDWELLING CATHETER PRESENT ON ADMISSION: ICD-10-CM

## 2024-06-13 PROCEDURE — 99999 PR PBB SHADOW E&M-EST. PATIENT-LVL III: CPT | Mod: PBBFAC,,,

## 2024-06-13 PROCEDURE — 1126F AMNT PAIN NOTED NONE PRSNT: CPT | Mod: CPTII,S$GLB,,

## 2024-06-13 PROCEDURE — 1160F RVW MEDS BY RX/DR IN RCRD: CPT | Mod: CPTII,S$GLB,,

## 2024-06-13 PROCEDURE — 51702 INSERT TEMP BLADDER CATH: CPT | Mod: S$GLB,,,

## 2024-06-13 PROCEDURE — 99499 UNLISTED E&M SERVICE: CPT | Mod: S$GLB,,,

## 2024-06-13 PROCEDURE — 1159F MED LIST DOCD IN RCRD: CPT | Mod: CPTII,S$GLB,,

## 2024-06-13 RX ORDER — METHOCARBAMOL 500 MG/1
TABLET, FILM COATED ORAL
COMMUNITY
Start: 2024-05-31

## 2024-06-13 NOTE — PROGRESS NOTES
CHIEF COMPLAINT:  Catheter change      HISTORY OF PRESENTING ILLINESS:  Dimitri Johnson is a 89 y.o. male established patient of Dr. Moon. He was seen in office 5/13/24 for cath change.     A catheter was replaced 3/2/2024 due to inability to urinate. Presents today for monthly catheter change. Patient does not want Rezum, states he prefers to manage urinary retention with gray catheter. Current resident of [facility].         REVIEW OF SYSTEMS:  Review of Systems   Constitutional:  Negative for chills and fever.   HENT:  Negative for congestion and sore throat.    Respiratory:  Negative for cough and shortness of breath.    Cardiovascular:  Negative for chest pain and palpitations.   Gastrointestinal:  Negative for nausea and vomiting.   Genitourinary:  Negative for flank pain and hematuria.        Gray catheter present   Neurological:  Positive for weakness. Negative for dizziness and headaches.          PATIENT HISTORY:    Past Medical History:   Diagnosis Date    Glaucoma     Hypertension        Past Surgical History:   Procedure Laterality Date    CORNEAL TRANSPLANT Right     CORNEAL TRANSPLANT Left 7/17/14    [facility]       No family history on file.    Social History     Socioeconomic History    Marital status:    Tobacco Use    Smoking status: Never   Substance and Sexual Activity    Alcohol use: Yes     Comment: once a month     Social Determinants of Health     Financial Resource Strain: Low Risk  (12/23/2023)    Overall Financial Resource Strain (CARDIA)     Difficulty of Paying Living Expenses: Not hard at all   Food Insecurity: No Food Insecurity (12/23/2023)    Hunger Vital Sign     Worried About Running Out of Food in the Last Year: Never true     Ran Out of Food in the Last Year: Never true   Transportation Needs: No Transportation Needs (12/23/2023)    PRAPARE - Transportation     Lack of Transportation (Medical): No     Lack of Transportation (Non-Medical): No   Physical Activity: Inactive  (12/23/2023)    Exercise Vital Sign     Days of Exercise per Week: 0 days     Minutes of Exercise per Session: 0 min   Stress: No Stress Concern Present (12/23/2023)    [employer] [place] [employer] - Occupational Stress Questionnaire     Feeling of Stress : Only a little   Housing Stability: Unknown (12/23/2023)    Housing Stability Vital Sign     Unable to Pay for Housing in the Last Year: No     Unstable Housing in the Last Year: No       Allergies:  Pcn [penicillins]    Medications:    Current Outpatient Medications:     acetaminophen (TYLENOL) 500 MG tablet, Take 2 tablets (1,000 mg total) by mouth every 8 (eight) hours as needed for Pain., Disp: , Rfl: 0    ascorbic acid, vitamin C, (VITAMIN C) 500 MG tablet, Take 1 tablet (500 mg total) by mouth 2 (two) times daily., Disp: , Rfl:     aspirin 81 MG Chew, Take 81 mg by mouth once daily., Disp: , Rfl:     bisacodyL (DULCOLAX) 10 mg Supp, Place 1 suppository (10 mg total) rectally daily as needed (constipation)., Disp: 10 suppository, Rfl: 0    calcium carbonate (TUMS) 200 mg calcium (500 mg) chewable tablet, Take 1 tablet (500 mg total) by mouth 2 (two) times daily as needed for Heartburn., Disp: , Rfl:     calcium-vitamin D3 (OS-WILLIE 500 + D3) 500 mg-5 mcg (200 unit) per tablet, Take 2 tablets by mouth once daily., Disp: 60 tablet, Rfl: 11    finasteride (PROSCAR) 5 mg tablet, Take 5 mg by mouth once daily., Disp: , Rfl:     gabapentin (NEURONTIN) 100 MG capsule, Take 100 mg by mouth 2 (two) times daily., Disp: , Rfl:     melatonin (MELATIN) 3 mg tablet, Take 2 tablets (6 mg total) by mouth nightly., Disp: , Rfl: 0    methocarbamoL (ROBAXIN) 500 MG Tab, Take by mouth., Disp: , Rfl:     mirtazapine (REMERON) 7.5 MG Tab, Take 1 tablet (7.5 mg total) by mouth every evening., Disp: 30 tablet, Rfl: 11    polyethylene glycol (GLYCOLAX) 17 gram PwPk, Take 17 g by mouth 2 (two) times daily., Disp: , Rfl: 0    senna-docusate 8.6-50 mg (PERICOLACE) 8.6-50 mg  per tablet, Take 2 tablets by mouth 2 (two) times daily., Disp: , Rfl:     zinc sulfate (ZINCATE) 50 mg zinc (220 mg) capsule, Take 1 capsule (220 mg total) by mouth once daily., Disp: , Rfl:     PHYSICAL EXAMINATION:  Physical Exam  Constitutional:       Appearance: Normal appearance.   HENT:      Head: Normocephalic and atraumatic.      Right Ear: External ear normal.      Left Ear: External ear normal.      Nose: Nose normal.      Mouth/Throat:      Mouth: Mucous membranes are moist.   Pulmonary:      Effort: Pulmonary effort is normal. No respiratory distress.   Musculoskeletal:      Comments: weakness   Skin:     General: Skin is warm and dry.   Neurological:      Mental Status: He is alert. Mental status is at baseline.   Psychiatric:         Mood and Affect: Mood normal.            Lab Results   Component Value Date    CREATININE 1.2 2024    EGFRNORACEVR 57.4 (A) 2024               IMPRESSION:    Encounter Diagnoses   Name Primary?    Enlarged prostate with urinary obstruction Yes    Indwelling catheter present on admission          Assessment:       1. Enlarged prostate with urinary obstruction    2. Indwelling catheter present on admission        Plan:   Nurse Derrell identified using two identifiers (name and ). Allergies checked. Ramirez cath currently in place was discontinued by deflating ballon using 10cc syringe. Genitalia prepped and draped in a sterile fashion. 10cc of 2% lidocaine jelly used for local analgesia.16fr catheter placed without difficulty. Ballon inflated with 10cc of sterile water. Patient tolerated procedure without complication. He was instructed on catheter care and escorted to lobby in wheelchair.       RTC 1 month for catheter change     I spent 30 minutes with the patient of which more than half was spent in direct consultation with the patient in regards to our treatment and plan.  We addressed the office findings and recent labs; any need to go ER today.   Education  and recommendations of today's plan of care including home remedies and needed follow up with PCP.   We discussed the chief complaints; reviewed the LUTS and the possible contributory factors.

## 2024-09-10 ENCOUNTER — OFFICE VISIT (OUTPATIENT)
Dept: UROLOGY | Facility: CLINIC | Age: 89
End: 2024-09-10
Payer: MEDICARE

## 2024-09-10 VITALS
SYSTOLIC BLOOD PRESSURE: 159 MMHG | HEART RATE: 62 BPM | BODY MASS INDEX: 23.6 KG/M2 | WEIGHT: 164.88 LBS | HEIGHT: 70 IN | DIASTOLIC BLOOD PRESSURE: 90 MMHG

## 2024-09-10 DIAGNOSIS — N40.1 ENLARGED PROSTATE WITH URINARY OBSTRUCTION: ICD-10-CM

## 2024-09-10 DIAGNOSIS — R33.9 URINARY RETENTION: Primary | ICD-10-CM

## 2024-09-10 DIAGNOSIS — N13.8 ENLARGED PROSTATE WITH URINARY OBSTRUCTION: ICD-10-CM

## 2024-09-10 PROCEDURE — 99499 UNLISTED E&M SERVICE: CPT | Mod: S$GLB,,,

## 2024-09-10 PROCEDURE — 1159F MED LIST DOCD IN RCRD: CPT | Mod: CPTII,S$GLB,,

## 2024-09-10 PROCEDURE — 99999 PR PBB SHADOW E&M-EST. PATIENT-LVL III: CPT | Mod: PBBFAC,,,

## 2024-09-10 PROCEDURE — 1160F RVW MEDS BY RX/DR IN RCRD: CPT | Mod: CPTII,S$GLB,,

## 2024-09-10 PROCEDURE — 51702 INSERT TEMP BLADDER CATH: CPT | Mod: S$GLB,,,

## 2024-09-10 NOTE — PROGRESS NOTES
CHIEF COMPLAINT:  Catheter change      HISTORY OF PRESENTING ILLINESS:  Dimitri Johnson is a 89 y.o. male established patient of Dr. Moon. He was seen in office 6/13/2024 for cath change. Here today for follow up. Patient and [facility] staff member unsure of the reason for the appointment today. I am assuming the patient needs a catheter change.      A catheter was replaced 3/2/2024 due to inability to urinate. Presents today for monthly catheter change. Patient does not want Rezum, states he prefers to manage urinary retention with gray catheter. Current resident of [facility].     PMHx listed below.    REVIEW OF SYSTEMS:  Review of Systems   Constitutional:  Negative for chills and fever.   HENT:  Negative for congestion and sore throat.    Respiratory:  Negative for cough and shortness of breath.    Cardiovascular:  Negative for chest pain and palpitations.   Gastrointestinal:  Negative for nausea and vomiting.   Genitourinary:  Negative for flank pain and hematuria.        Gray catheter present   Neurological:  Positive for weakness. Negative for dizziness and headaches. Positive for memory changes.      PATIENT HISTORY:    Past Medical History:   Diagnosis Date    Glaucoma     Hypertension        Past Surgical History:   Procedure Laterality Date    CORNEAL TRANSPLANT Right     CORNEAL TRANSPLANT Left 7/17/14    [facility]       No family history on file.    Social History     Socioeconomic History    Marital status:    Tobacco Use    Smoking status: Never   Substance and Sexual Activity    Alcohol use: Yes     Comment: once a month     Social Determinants of Health     Financial Resource Strain: Low Risk  (12/23/2023)    Overall Financial Resource Strain (CARDIA)     Difficulty of Paying Living Expenses: Not hard at all   Food Insecurity: No Food Insecurity (12/23/2023)    Hunger Vital Sign     Worried About Running Out of Food in the Last Year: Never true     Ran Out of Food in the Last Year: Never true    Transportation Needs: No Transportation Needs (12/23/2023)    PRAPARE - Transportation     Lack of Transportation (Medical): No     Lack of Transportation (Non-Medical): No   Physical Activity: Inactive (12/23/2023)    Exercise Vital Sign     Days of Exercise per Week: 0 days     Minutes of Exercise per Session: 0 min   Stress: No Stress Concern Present (12/23/2023)    [employer] [place] [employer] - Occupational Stress Questionnaire     Feeling of Stress : Only a little   Housing Stability: Unknown (12/23/2023)    Housing Stability Vital Sign     Unable to Pay for Housing in the Last Year: No     Unstable Housing in the Last Year: No       Allergies:  Pcn [penicillins]    Medications:    Current Outpatient Medications:     acetaminophen (TYLENOL) 500 MG tablet, Take 2 tablets (1,000 mg total) by mouth every 8 (eight) hours as needed for Pain., Disp: , Rfl: 0    ascorbic acid, vitamin C, (VITAMIN C) 500 MG tablet, Take 1 tablet (500 mg total) by mouth 2 (two) times daily., Disp: , Rfl:     aspirin 81 MG Chew, Take 81 mg by mouth once daily., Disp: , Rfl:     bisacodyL (DULCOLAX) 10 mg Supp, Place 1 suppository (10 mg total) rectally daily as needed (constipation)., Disp: 10 suppository, Rfl: 0    calcium carbonate (TUMS) 200 mg calcium (500 mg) chewable tablet, Take 1 tablet (500 mg total) by mouth 2 (two) times daily as needed for Heartburn., Disp: , Rfl:     calcium-vitamin D3 (OS-WILLIE 500 + D3) 500 mg-5 mcg (200 unit) per tablet, Take 2 tablets by mouth once daily., Disp: 60 tablet, Rfl: 11    finasteride (PROSCAR) 5 mg tablet, Take 5 mg by mouth once daily., Disp: , Rfl:     gabapentin (NEURONTIN) 100 MG capsule, Take 100 mg by mouth 2 (two) times daily., Disp: , Rfl:     melatonin (MELATIN) 3 mg tablet, Take 2 tablets (6 mg total) by mouth nightly., Disp: , Rfl: 0    methocarbamoL (ROBAXIN) 500 MG Tab, Take by mouth., Disp: , Rfl:     mirtazapine (REMERON) 7.5 MG Tab, Take 1 tablet (7.5 mg total) by  mouth every evening., Disp: 30 tablet, Rfl: 11    polyethylene glycol (GLYCOLAX) 17 gram PwPk, Take 17 g by mouth 2 (two) times daily., Disp: , Rfl: 0    senna-docusate 8.6-50 mg (PERICOLACE) 8.6-50 mg per tablet, Take 2 tablets by mouth 2 (two) times daily., Disp: , Rfl:     zinc sulfate (ZINCATE) 50 mg zinc (220 mg) capsule, Take 1 capsule (220 mg total) by mouth once daily., Disp: , Rfl:     PHYSICAL EXAMINATION:  Physical Exam  HENT:      Head: Normocephalic.      Right Ear: External ear normal.      Left Ear: External ear normal.      Nose: Nose normal.      Mouth/Throat:      Mouth: Mucous membranes are moist.   Pulmonary:      Effort: Pulmonary effort is normal. No respiratory distress.   Skin:     General: Skin is warm and dry.   Neurological:      Mental Status: He is alert. Mental status is at baseline.           Lab Results   Component Value Date    CREATININE 1.2 2024    EGFRNORACEVR 57.4 (A) 2024             IMPRESSION:    Encounter Diagnoses   Name Primary?    Urinary retention Yes    Enlarged prostate with urinary obstruction          Assessment:       1. Urinary retention    2. Enlarged prostate with urinary obstruction        Plan:   Nurse Derrell identified using two identifiers (name and ). Allergies checked. Ramirez cath currently in place was discontinued by deflating ballon using 10cc syringe. Genitalia prepped and draped in a sterile fashion. 10cc of 2% lidocaine jelly used for local analgesia.16fr catheter placed without difficulty. Ballon inflated with 10cc of sterile water. Patient tolerated procedure without complication. He was instructed on catheter care and escorted to lobby in wheelchair.       RTC 1 month for catheter change     I spent 30 minutes with the patient of which more than half was spent in direct consultation with the patient in regards to our treatment and plan.  We addressed the office findings and recent labs; any need to go ER today.   Education and  recommendations of today's plan of care including home remedies and needed follow up with PCP.   We discussed the chief complaints; reviewed the LUTS and the possible contributory factors.

## 2024-10-10 ENCOUNTER — OFFICE VISIT (OUTPATIENT)
Dept: UROLOGY | Facility: CLINIC | Age: 89
End: 2024-10-10
Payer: MEDICARE

## 2024-10-10 VITALS
BODY MASS INDEX: 23.6 KG/M2 | SYSTOLIC BLOOD PRESSURE: 138 MMHG | HEART RATE: 71 BPM | WEIGHT: 164.88 LBS | HEIGHT: 70 IN | DIASTOLIC BLOOD PRESSURE: 73 MMHG

## 2024-10-10 DIAGNOSIS — N13.8 ENLARGED PROSTATE WITH URINARY OBSTRUCTION: ICD-10-CM

## 2024-10-10 DIAGNOSIS — N40.1 ENLARGED PROSTATE WITH URINARY OBSTRUCTION: ICD-10-CM

## 2024-10-10 DIAGNOSIS — R33.9 URINARY RETENTION: Primary | ICD-10-CM

## 2024-10-10 DIAGNOSIS — Z97.8 CHRONIC INDWELLING FOLEY CATHETER: ICD-10-CM

## 2024-10-10 PROCEDURE — 99999 PR PBB SHADOW E&M-EST. PATIENT-LVL III: CPT | Mod: PBBFAC,,,

## 2024-10-10 NOTE — PROGRESS NOTES
CHIEF COMPLAINT:  Urinary retention      HISTORY OF PRESENTING ILLINESS:  Dimitri Johnson is a 89 y.o. male established patient of Dr. Moon. He was seen in office 9/10/2024 for cath change. Here today for monthly catheter change. Voices no  issues or concerns.      A catheter was replaced 3/2/2024 due to inability to urinate. Presents today for monthly catheter change. Patient does not want Rezum, states he prefers to manage urinary retention with gray catheter. Current resident of [facility].      PMHx listed below.      REVIEW OF SYSTEMS:  Review of Systems   Constitutional:  Negative for chills and fever.   HENT:  Negative for congestion and sore throat.    Respiratory:  Negative for cough and shortness of breath.    Cardiovascular:  Negative for chest pain and palpitations.   Gastrointestinal:  Negative for nausea and vomiting.   Genitourinary:         See HPI   Neurological:  Positive for weakness. Negative for dizziness and headaches.         PATIENT HISTORY:    Past Medical History:   Diagnosis Date    Glaucoma     Hypertension        Past Surgical History:   Procedure Laterality Date    CORNEAL TRANSPLANT Right     CORNEAL TRANSPLANT Left 7/17/14    [facility]       No family history on file.    Social History     Socioeconomic History    Marital status:    Tobacco Use    Smoking status: Never   Substance and Sexual Activity    Alcohol use: Yes     Comment: once a month     Social Drivers of Health     Financial Resource Strain: Low Risk  (12/23/2023)    Overall Financial Resource Strain (CARDIA)     Difficulty of Paying Living Expenses: Not hard at all   Food Insecurity: No Food Insecurity (12/23/2023)    Hunger Vital Sign     Worried About Running Out of Food in the Last Year: Never true     Ran Out of Food in the Last Year: Never true   Transportation Needs: No Transportation Needs (12/23/2023)    PRAPARE - Transportation     Lack of Transportation (Medical): No     Lack of Transportation  (Non-Medical): No   Physical Activity: Inactive (12/23/2023)    Exercise Vital Sign     Days of Exercise per Week: 0 days     Minutes of Exercise per Session: 0 min   Stress: No Stress Concern Present (12/23/2023)    [employer] [place] [employer] - Occupational Stress Questionnaire     Feeling of Stress : Only a little   Housing Stability: Unknown (12/23/2023)    Housing Stability Vital Sign     Unable to Pay for Housing in the Last Year: No     Unstable Housing in the Last Year: No       Allergies:  Pcn [penicillins]    Medications:    Current Outpatient Medications:     acetaminophen (TYLENOL) 500 MG tablet, Take 2 tablets (1,000 mg total) by mouth every 8 (eight) hours as needed for Pain., Disp: , Rfl: 0    ascorbic acid, vitamin C, (VITAMIN C) 500 MG tablet, Take 1 tablet (500 mg total) by mouth 2 (two) times daily., Disp: , Rfl:     aspirin 81 MG Chew, Take 81 mg by mouth once daily., Disp: , Rfl:     bisacodyL (DULCOLAX) 10 mg Supp, Place 1 suppository (10 mg total) rectally daily as needed (constipation)., Disp: 10 suppository, Rfl: 0    calcium carbonate (TUMS) 200 mg calcium (500 mg) chewable tablet, Take 1 tablet (500 mg total) by mouth 2 (two) times daily as needed for Heartburn., Disp: , Rfl:     calcium-vitamin D3 (OS-WILLIE 500 + D3) 500 mg-5 mcg (200 unit) per tablet, Take 2 tablets by mouth once daily., Disp: 60 tablet, Rfl: 11    finasteride (PROSCAR) 5 mg tablet, Take 5 mg by mouth once daily., Disp: , Rfl:     gabapentin (NEURONTIN) 100 MG capsule, Take 100 mg by mouth 2 (two) times daily., Disp: , Rfl:     melatonin (MELATIN) 3 mg tablet, Take 2 tablets (6 mg total) by mouth nightly., Disp: , Rfl: 0    methocarbamoL (ROBAXIN) 500 MG Tab, Take by mouth., Disp: , Rfl:     mirtazapine (REMERON) 7.5 MG Tab, Take 1 tablet (7.5 mg total) by mouth every evening., Disp: 30 tablet, Rfl: 11    polyethylene glycol (GLYCOLAX) 17 gram PwPk, Take 17 g by mouth 2 (two) times daily., Disp: , Rfl: 0     senna-docusate 8.6-50 mg (PERICOLACE) 8.6-50 mg per tablet, Take 2 tablets by mouth 2 (two) times daily., Disp: , Rfl:     zinc sulfate (ZINCATE) 50 mg zinc (220 mg) capsule, Take 1 capsule (220 mg total) by mouth once daily., Disp: , Rfl:     PHYSICAL EXAMINATION:  Physical Exam  HENT:      Head: Normocephalic and atraumatic.      Right Ear: External ear normal.      Left Ear: External ear normal.      Nose: Nose normal.      Mouth/Throat:      Mouth: Mucous membranes are moist.   Pulmonary:      Effort: Pulmonary effort is normal. No respiratory distress.   Skin:     General: Skin is warm and dry.   Neurological:      General: No focal deficit present.      Mental Status: He is alert and oriented to person, place, and time.   Psychiatric:         Mood and Affect: Mood normal.         Behavior: Behavior normal.         Lab Results   Component Value Date    CREATININE 1.2 2024    EGFRNORACEVR 57.4 (A) 2024               IMPRESSION:    Encounter Diagnoses   Name Primary?    Urinary retention Yes    Enlarged prostate with urinary obstruction     Chronic indwelling Ramirez catheter          Assessment:       1. Urinary retention    2. Enlarged prostate with urinary obstruction    3. Chronic indwelling Ramirez catheter        Plan:   Nurse Derrell identified using two identifiers (name and ). Allergies checked. Ramirez cath currently in place was discontinued by deflating ballon using 10cc syringe. Genitalia prepped and draped in a sterile fashion. 10cc of 2% lidocaine jelly used for local analgesia.16fr catheter placed without difficulty. Ballon inflated with 10cc of sterile water. Patient tolerated procedure without complication. He was instructed on catheter care and escorted to lobby in wheelchair.       RTC 1 month for catheter change     I spent 30 minutes with the patient of which more than half was spent in direct consultation with the patient in regards to our treatment and plan.

## 2025-01-08 ENCOUNTER — OFFICE VISIT (OUTPATIENT)
Dept: UROLOGY | Facility: CLINIC | Age: OVER 89
End: 2025-01-08
Payer: MEDICARE

## 2025-01-08 VITALS — SYSTOLIC BLOOD PRESSURE: 174 MMHG | DIASTOLIC BLOOD PRESSURE: 96 MMHG | HEART RATE: 76 BPM

## 2025-01-08 DIAGNOSIS — R33.9 URINARY RETENTION: Primary | ICD-10-CM

## 2025-01-08 DIAGNOSIS — N40.1 ENLARGED PROSTATE WITH URINARY OBSTRUCTION: ICD-10-CM

## 2025-01-08 DIAGNOSIS — N13.8 ENLARGED PROSTATE WITH URINARY OBSTRUCTION: ICD-10-CM

## 2025-01-08 PROCEDURE — 1160F RVW MEDS BY RX/DR IN RCRD: CPT | Mod: CPTII,S$GLB,,

## 2025-01-08 PROCEDURE — 99214 OFFICE O/P EST MOD 30 MIN: CPT | Mod: S$GLB,,,

## 2025-01-08 PROCEDURE — 99999 PR PBB SHADOW E&M-EST. PATIENT-LVL III: CPT | Mod: PBBFAC,,,

## 2025-01-08 PROCEDURE — 1159F MED LIST DOCD IN RCRD: CPT | Mod: CPTII,S$GLB,,

## 2025-01-08 PROCEDURE — 1126F AMNT PAIN NOTED NONE PRSNT: CPT | Mod: CPTII,S$GLB,,

## 2025-01-08 NOTE — PROGRESS NOTES
CHIEF COMPLAINT:  Urinary retention      HISTORY OF PRESENTING ILLINESS:  Dimitri Johnson is a 89 y.o. male established patient of Dr. Moon. He was seen in office on 10/10/24 for cath change. He is here today for monthly cath change. He voices no  concerns.            REVIEW OF SYSTEMS:  Review of Systems   Constitutional:  Negative for chills and fever.   Respiratory:  Negative for cough and shortness of breath.    Cardiovascular:  Negative for chest pain and palpitations.   Gastrointestinal:  Negative for abdominal pain, nausea and vomiting.   Genitourinary:         See HPI   Neurological:  Positive for weakness. Negative for dizziness and headaches.         PATIENT HISTORY:    Past Medical History:   Diagnosis Date    Glaucoma     Hypertension        Past Surgical History:   Procedure Laterality Date    CORNEAL TRANSPLANT Right     CORNEAL TRANSPLANT Left 7/17/14    [facility]       No family history on file.    Social History     Socioeconomic History    Marital status:    Tobacco Use    Smoking status: Never   Substance and Sexual Activity    Alcohol use: Yes     Comment: once a month     Social Drivers of Health     Financial Resource Strain: Low Risk  (12/23/2023)    Overall Financial Resource Strain (CARDIA)     Difficulty of Paying Living Expenses: Not hard at all   Food Insecurity: No Food Insecurity (12/23/2023)    Hunger Vital Sign     Worried About Running Out of Food in the Last Year: Never true     Ran Out of Food in the Last Year: Never true   Transportation Needs: No Transportation Needs (12/23/2023)    PRAPARE - Transportation     Lack of Transportation (Medical): No     Lack of Transportation (Non-Medical): No   Physical Activity: Inactive (12/23/2023)    Exercise Vital Sign     Days of Exercise per Week: 0 days     Minutes of Exercise per Session: 0 min   Stress: No Stress Concern Present (12/23/2023)    [employer] [place] [employer] - Occupational Stress Questionnaire     Feeling of  Stress : Only a little   Housing Stability: Unknown (12/23/2023)    Housing Stability Vital Sign     Unable to Pay for Housing in the Last Year: No     Unstable Housing in the Last Year: No       Allergies:  Pcn [penicillins]    Medications:    Current Outpatient Medications:     acetaminophen (TYLENOL) 500 MG tablet, Take 2 tablets (1,000 mg total) by mouth every 8 (eight) hours as needed for Pain., Disp: , Rfl: 0    ascorbic acid, vitamin C, (VITAMIN C) 500 MG tablet, Take 1 tablet (500 mg total) by mouth 2 (two) times daily., Disp: , Rfl:     aspirin 81 MG Chew, Take 81 mg by mouth once daily., Disp: , Rfl:     bisacodyL (DULCOLAX) 10 mg Supp, Place 1 suppository (10 mg total) rectally daily as needed (constipation)., Disp: 10 suppository, Rfl: 0    calcium carbonate (TUMS) 200 mg calcium (500 mg) chewable tablet, Take 1 tablet (500 mg total) by mouth 2 (two) times daily as needed for Heartburn., Disp: , Rfl:     calcium-vitamin D3 (OS-WILLIE 500 + D3) 500 mg-5 mcg (200 unit) per tablet, Take 2 tablets by mouth once daily., Disp: 60 tablet, Rfl: 11    finasteride (PROSCAR) 5 mg tablet, Take 5 mg by mouth once daily., Disp: , Rfl:     gabapentin (NEURONTIN) 100 MG capsule, Take 100 mg by mouth 2 (two) times daily., Disp: , Rfl:     melatonin (MELATIN) 3 mg tablet, Take 2 tablets (6 mg total) by mouth nightly., Disp: , Rfl: 0    methocarbamoL (ROBAXIN) 500 MG Tab, Take by mouth., Disp: , Rfl:     mirtazapine (REMERON) 7.5 MG Tab, Take 1 tablet (7.5 mg total) by mouth every evening., Disp: 30 tablet, Rfl: 11    polyethylene glycol (GLYCOLAX) 17 gram PwPk, Take 17 g by mouth 2 (two) times daily., Disp: , Rfl: 0    senna-docusate 8.6-50 mg (PERICOLACE) 8.6-50 mg per tablet, Take 2 tablets by mouth 2 (two) times daily., Disp: , Rfl:     zinc sulfate (ZINCATE) 50 mg zinc (220 mg) capsule, Take 1 capsule (220 mg total) by mouth once daily., Disp: , Rfl:     PHYSICAL EXAMINATION:  Physical Exam  HENT:      Head:  Normocephalic and atraumatic.   Eyes:      Pupils: Pupils are equal, round, and reactive to light.   Pulmonary:      Effort: Pulmonary effort is normal. No respiratory distress.   Skin:     General: Skin is warm and dry.   Neurological:      General: No focal deficit present.      Mental Status: He is alert and oriented to person, place, and time.   Psychiatric:         Mood and Affect: Mood normal.         Behavior: Behavior normal.         Lab Results   Component Value Date    CREATININE 1.2 2024    EGFRNORACEVR 57.4 (A) 2024               IMPRESSION:    Encounter Diagnoses   Name Primary?    Urinary retention Yes    Enlarged prostate with urinary obstruction            Assessment:       1. Urinary retention    2. Enlarged prostate with urinary obstruction          Plan:   Nurse Derrell identified using two identifiers (name and ). Allergies checked. Ramirez cath currently in place was discontinued by deflating ballon using 10cc syringe. Genitalia prepped and draped in a sterile fashion. 10cc of 2% lidocaine jelly used for local analgesia.16fr catheter placed without difficulty. Ballon inflated with 10cc of sterile water. Patient tolerated procedure without complication. He was instructed on catheter care and escorted to lobby in wheelchair.       RTC 1 month for catheter change     I spent a total of 30 minutes on the day of the visit. This includes face to face time and non-face to face time preparing to see the patient (eg, review of tests), obtaining and/or reviewing separately obtained history, documenting clinical information in the electronic or other health record, independently interpreting results and communicating results to the patient/family/caregiver, or care coordinator  Reviewed the possible contributory factors.

## 2025-02-03 ENCOUNTER — TELEPHONE (OUTPATIENT)
Dept: UROLOGY | Facility: CLINIC | Age: OVER 89
End: 2025-02-03
Payer: MEDICARE

## 2025-02-03 NOTE — TELEPHONE ENCOUNTER
Talk to pt about up coming appt on 2/5/2025 with Carol Amaya at 8:00 am main building 4 Floor Thanks .VS

## 2025-02-05 ENCOUNTER — OFFICE VISIT (OUTPATIENT)
Dept: UROLOGY | Facility: CLINIC | Age: OVER 89
End: 2025-02-05
Payer: MEDICARE

## 2025-02-05 VITALS
SYSTOLIC BLOOD PRESSURE: 191 MMHG | DIASTOLIC BLOOD PRESSURE: 81 MMHG | HEART RATE: 65 BPM | HEIGHT: 70 IN | BODY MASS INDEX: 23.6 KG/M2 | WEIGHT: 164.88 LBS

## 2025-02-05 DIAGNOSIS — R33.9 URINARY RETENTION: Primary | ICD-10-CM

## 2025-02-05 DIAGNOSIS — N40.1 ENLARGED PROSTATE WITH URINARY OBSTRUCTION: ICD-10-CM

## 2025-02-05 DIAGNOSIS — N13.8 ENLARGED PROSTATE WITH URINARY OBSTRUCTION: ICD-10-CM

## 2025-02-05 PROCEDURE — 1159F MED LIST DOCD IN RCRD: CPT | Mod: CPTII,S$GLB,,

## 2025-02-05 PROCEDURE — 99214 OFFICE O/P EST MOD 30 MIN: CPT | Mod: S$GLB,,,

## 2025-02-05 PROCEDURE — 1160F RVW MEDS BY RX/DR IN RCRD: CPT | Mod: CPTII,S$GLB,,

## 2025-02-05 PROCEDURE — 99999 PR PBB SHADOW E&M-EST. PATIENT-LVL III: CPT | Mod: PBBFAC,,,

## 2025-02-05 NOTE — PROGRESS NOTES
CHIEF COMPLAINT:  Urinary retention      HISTORY OF PRESENTING ILLINESS:  Dimitri Johnson is a 89 y.o. male established patient of Dr. Moon. He was seen in office last on 1/8/25 for cath change. He is here today with his caregiver for monthly cath change. He voices no  concerns. He denies fever and chills     He resides at an assisted living facility.         REVIEW OF SYSTEMS:  Review of Systems   Constitutional:  Negative for chills and fever.   Respiratory:  Negative for cough and shortness of breath.    Cardiovascular:  Negative for chest pain and palpitations.   Gastrointestinal:  Negative for abdominal pain, nausea and vomiting.   Genitourinary:         See HPI   Neurological:  Positive for weakness. Negative for dizziness and headaches.         PATIENT HISTORY:    Past Medical History:   Diagnosis Date    Glaucoma     Hypertension        Past Surgical History:   Procedure Laterality Date    CORNEAL TRANSPLANT Right     CORNEAL TRANSPLANT Left 7/17/14    [facility]       No family history on file.    Social History     Socioeconomic History    Marital status:    Tobacco Use    Smoking status: Never   Substance and Sexual Activity    Alcohol use: Yes     Comment: once a month     Social Drivers of Health     Financial Resource Strain: Low Risk  (12/23/2023)    Overall Financial Resource Strain (CARDIA)     Difficulty of Paying Living Expenses: Not hard at all   Food Insecurity: No Food Insecurity (12/23/2023)    Hunger Vital Sign     Worried About Running Out of Food in the Last Year: Never true     Ran Out of Food in the Last Year: Never true   Transportation Needs: No Transportation Needs (12/23/2023)    PRAPARE - Transportation     Lack of Transportation (Medical): No     Lack of Transportation (Non-Medical): No   Physical Activity: Inactive (12/23/2023)    Exercise Vital Sign     Days of Exercise per Week: 0 days     Minutes of Exercise per Session: 0 min   Stress: No Stress Concern Present (12/23/2023)     [facility] [facility] [employer]ational [employer] - Occupational Stress Questionnaire     Feeling of Stress : Only a little   Housing Stability: Unknown (12/23/2023)    Housing Stability Vital Sign     Unable to Pay for Housing in the Last Year: No     Unstable Housing in the Last Year: No       Allergies:  Pcn [penicillins]    Medications:    Current Outpatient Medications:     acetaminophen (TYLENOL) 500 MG tablet, Take 2 tablets (1,000 mg total) by mouth every 8 (eight) hours as needed for Pain., Disp: , Rfl: 0    ascorbic acid, vitamin C, (VITAMIN C) 500 MG tablet, Take 1 tablet (500 mg total) by mouth 2 (two) times daily., Disp: , Rfl:     aspirin 81 MG Chew, Take 81 mg by mouth once daily., Disp: , Rfl:     bisacodyL (DULCOLAX) 10 mg Supp, Place 1 suppository (10 mg total) rectally daily as needed (constipation)., Disp: 10 suppository, Rfl: 0    finasteride (PROSCAR) 5 mg tablet, Take 5 mg by mouth once daily., Disp: , Rfl:     gabapentin (NEURONTIN) 100 MG capsule, Take 100 mg by mouth 2 (two) times daily., Disp: , Rfl:     melatonin (MELATIN) 3 mg tablet, Take 2 tablets (6 mg total) by mouth nightly., Disp: , Rfl: 0    methocarbamoL (ROBAXIN) 500 MG Tab, Take by mouth., Disp: , Rfl:     polyethylene glycol (GLYCOLAX) 17 gram PwPk, Take 17 g by mouth 2 (two) times daily., Disp: , Rfl: 0    senna-docusate 8.6-50 mg (PERICOLACE) 8.6-50 mg per tablet, Take 2 tablets by mouth 2 (two) times daily., Disp: , Rfl:     zinc sulfate (ZINCATE) 50 mg zinc (220 mg) capsule, Take 1 capsule (220 mg total) by mouth once daily., Disp: , Rfl:     calcium carbonate (TUMS) 200 mg calcium (500 mg) chewable tablet, Take 1 tablet (500 mg total) by mouth 2 (two) times daily as needed for Heartburn., Disp: , Rfl:     calcium-vitamin D3 (OS-WILLIE 500 + D3) 500 mg-5 mcg (200 unit) per tablet, Take 2 tablets by mouth once daily., Disp: 60 tablet, Rfl: 11    mirtazapine (REMERON) 7.5 MG Tab, Take 1 tablet (7.5 mg total) by mouth every evening.,  Disp: 30 tablet, Rfl: 11    PHYSICAL EXAMINATION:  Physical Exam  HENT:      Head: Normocephalic and atraumatic.   Eyes:      Pupils: Pupils are equal, round, and reactive to light.   Pulmonary:      Effort: Pulmonary effort is normal. No respiratory distress.   Musculoskeletal:      Comments: Wheelchair bound   Skin:     General: Skin is warm and dry.   Neurological:      General: No focal deficit present.      Mental Status: He is alert and oriented to person, place, and time.      Motor: Weakness present.   Psychiatric:         Mood and Affect: Mood normal.         Behavior: Behavior normal.         Lab Results   Component Value Date    CREATININE 1.2 2024    EGFRNORACEVR 57.4 (A) 2024               IMPRESSION:    Encounter Diagnoses   Name Primary?    Urinary retention Yes    Enlarged prostate with urinary obstruction              Assessment:       1. Urinary retention    2. Enlarged prostate with urinary obstruction            Plan:   Nurse Derrell identified using two identifiers (name and ). Allergies checked. Ramirez cath currently in place was discontinued by deflating ballon using 10cc syringe. Genitalia prepped and draped in a sterile fashion. 10cc of 2% lidocaine jelly used for local analgesia.16fr straight tip catheter placed without difficulty. Ballon inflated with 10cc of sterile water. Patient tolerated procedure without complication. He was instructed on catheter care and escorted to lobby in wheelchair.       RTC 1 month for catheter change     I spent a total of 30 minutes on the day of the visit. This includes face to face time and non-face to face time preparing to see the patient (eg, review of tests), obtaining and/or reviewing separately obtained history, documenting clinical information in the electronic or other health record, independently interpreting results and communicating results to the patient/family/caregiver, or care coordinator  Reviewed the possible contributory  factors.

## 2025-03-05 ENCOUNTER — OFFICE VISIT (OUTPATIENT)
Dept: UROLOGY | Facility: CLINIC | Age: OVER 89
End: 2025-03-05
Payer: MEDICARE

## 2025-03-05 VITALS — HEART RATE: 89 BPM | SYSTOLIC BLOOD PRESSURE: 146 MMHG | DIASTOLIC BLOOD PRESSURE: 90 MMHG

## 2025-03-05 DIAGNOSIS — N40.1 ENLARGED PROSTATE WITH URINARY OBSTRUCTION: ICD-10-CM

## 2025-03-05 DIAGNOSIS — R33.9 URINARY RETENTION: Primary | ICD-10-CM

## 2025-03-05 DIAGNOSIS — N13.8 ENLARGED PROSTATE WITH URINARY OBSTRUCTION: ICD-10-CM

## 2025-03-05 PROCEDURE — 99999 PR PBB SHADOW E&M-EST. PATIENT-LVL III: CPT | Mod: PBBFAC,,,

## 2025-03-05 PROCEDURE — 1101F PT FALLS ASSESS-DOCD LE1/YR: CPT | Mod: CPTII,S$GLB,,

## 2025-03-05 PROCEDURE — 3288F FALL RISK ASSESSMENT DOCD: CPT | Mod: CPTII,S$GLB,,

## 2025-03-05 PROCEDURE — 1160F RVW MEDS BY RX/DR IN RCRD: CPT | Mod: CPTII,S$GLB,,

## 2025-03-05 PROCEDURE — 1159F MED LIST DOCD IN RCRD: CPT | Mod: CPTII,S$GLB,,

## 2025-03-05 PROCEDURE — 1126F AMNT PAIN NOTED NONE PRSNT: CPT | Mod: CPTII,S$GLB,,

## 2025-03-05 PROCEDURE — 99214 OFFICE O/P EST MOD 30 MIN: CPT | Mod: S$GLB,,,

## 2025-03-05 NOTE — PROGRESS NOTES
CHIEF COMPLAINT:  Urinary retention      HISTORY OF PRESENTING ILLINESS:  Dimitri Johnson is a 89 y.o. male established patient of Dr. Moon. He was seen in office last on 2/5/2025 for cath change. He is here today with his caregiver for monthly cath change. He voices no  concerns. He denies fever, chills, and hematuria.      He resides at an assisted living facility.         REVIEW OF SYSTEMS:  Review of Systems   Constitutional:  Negative for chills and fever.   Respiratory:  Negative for cough and shortness of breath.    Cardiovascular:  Negative for chest pain and palpitations.   Gastrointestinal:  Negative for abdominal pain, nausea and vomiting.   Genitourinary:         See HPI   Neurological:  Positive for weakness. Negative for dizziness and headaches.         PATIENT HISTORY:    Past Medical History:   Diagnosis Date    Glaucoma     Hypertension        Past Surgical History:   Procedure Laterality Date    CORNEAL TRANSPLANT Right     CORNEAL TRANSPLANT Left 7/17/14    [facility]       No family history on file.    Social History     Socioeconomic History    Marital status:    Tobacco Use    Smoking status: Never   Substance and Sexual Activity    Alcohol use: Yes     Comment: once a month     Social Drivers of Health     Financial Resource Strain: Low Risk  (12/23/2023)    Overall Financial Resource Strain (CARDIA)     Difficulty of Paying Living Expenses: Not hard at all   Food Insecurity: No Food Insecurity (12/23/2023)    Hunger Vital Sign     Worried About Running Out of Food in the Last Year: Never true     Ran Out of Food in the Last Year: Never true   Transportation Needs: No Transportation Needs (12/23/2023)    PRAPARE - Transportation     Lack of Transportation (Medical): No     Lack of Transportation (Non-Medical): No   Physical Activity: Inactive (12/23/2023)    Exercise Vital Sign     Days of Exercise per Week: 0 days     Minutes of Exercise per Session: 0 min   Stress: No Stress Concern Present  (12/23/2023)    [employer] - Occupational Stress Questionnaire     Feeling of Stress : Only a little   Housing Stability: Unknown (12/23/2023)    Housing Stability Vital Sign     Unable to Pay for Housing in the Last Year: No     Unstable Housing in the Last Year: No       Allergies:  Pcn [penicillins]    Medications:    Current Outpatient Medications:     acetaminophen (TYLENOL) 500 MG tablet, Take 2 tablets (1,000 mg total) by mouth every 8 (eight) hours as needed for Pain., Disp: , Rfl: 0    ascorbic acid, vitamin C, (VITAMIN C) 500 MG tablet, Take 1 tablet (500 mg total) by mouth 2 (two) times daily., Disp: , Rfl:     aspirin 81 MG Chew, Take 81 mg by mouth once daily., Disp: , Rfl:     bisacodyL (DULCOLAX) 10 mg Supp, Place 1 suppository (10 mg total) rectally daily as needed (constipation)., Disp: 10 suppository, Rfl: 0    finasteride (PROSCAR) 5 mg tablet, Take 5 mg by mouth once daily., Disp: , Rfl:     gabapentin (NEURONTIN) 100 MG capsule, Take 100 mg by mouth 2 (two) times daily., Disp: , Rfl:     melatonin (MELATIN) 3 mg tablet, Take 2 tablets (6 mg total) by mouth nightly., Disp: , Rfl: 0    methocarbamoL (ROBAXIN) 500 MG Tab, Take by mouth., Disp: , Rfl:     polyethylene glycol (GLYCOLAX) 17 gram PwPk, Take 17 g by mouth 2 (two) times daily., Disp: , Rfl: 0    senna-docusate 8.6-50 mg (PERICOLACE) 8.6-50 mg per tablet, Take 2 tablets by mouth 2 (two) times daily., Disp: , Rfl:     zinc sulfate (ZINCATE) 50 mg zinc (220 mg) capsule, Take 1 capsule (220 mg total) by mouth once daily., Disp: , Rfl:     calcium carbonate (TUMS) 200 mg calcium (500 mg) chewable tablet, Take 1 tablet (500 mg total) by mouth 2 (two) times daily as needed for Heartburn., Disp: , Rfl:     calcium-vitamin D3 (OS-WILLIE 500 + D3) 500 mg-5 mcg (200 unit) per tablet, Take 2 tablets by mouth once daily., Disp: 60 tablet, Rfl: 11    mirtazapine (REMERON) 7.5 MG Tab, Take 1 tablet (7.5 mg total) by mouth  every evening., Disp: 30 tablet, Rfl: 11    PHYSICAL EXAMINATION:  Physical Exam  HENT:      Head: Normocephalic and atraumatic.   Eyes:      Pupils: Pupils are equal, round, and reactive to light.   Pulmonary:      Effort: Pulmonary effort is normal. No respiratory distress.   Musculoskeletal:      Comments: Wheelchair bound   Skin:     General: Skin is warm and dry.   Neurological:      General: No focal deficit present.      Mental Status: He is alert and oriented to person, place, and time.      Motor: Weakness present.   Psychiatric:         Mood and Affect: Mood normal.         Behavior: Behavior normal.         Lab Results   Component Value Date    CREATININE 1.2 2024    EGFRNORACEVR 57.4 (A) 2024               IMPRESSION:    Encounter Diagnoses   Name Primary?    Urinary retention Yes    Enlarged prostate with urinary obstruction                Assessment:       1. Urinary retention    2. Enlarged prostate with urinary obstruction              Plan:   Nurse Derrell identified using two identifiers (name and ). Allergies checked. Ramirez cath currently in place was discontinued by deflating ballon using 10cc syringe. Genitalia prepped and draped in a sterile fashion. 10cc of 2% lidocaine jelly used for local analgesia.16fr straight tip catheter placed without difficulty. Ballon inflated with 10cc of sterile water. Patient tolerated procedure without complication. He was instructed on catheter care and escorted to lobby in wheelchair.       RTC 1 month for catheter change     I spent a total of 30 minutes on the day of the visit. This includes face to face time and non-face to face time preparing to see the patient (eg, review of tests), obtaining and/or reviewing separately obtained history, documenting clinical information in the electronic or other health record, independently interpreting results and communicating results to the patient/family/caregiver, or care coordinator  Reviewed the possible  contributory factors.

## 2025-04-08 ENCOUNTER — TELEPHONE (OUTPATIENT)
Dept: UROLOGY | Facility: CLINIC | Age: OVER 89
End: 2025-04-08
Payer: MEDICARE

## 2025-04-08 NOTE — TELEPHONE ENCOUNTER
This call is to remind you of your upcoming appointment on 4/9/2025  at 10:00am, located on the 4th floor clinic [facility] on [facility].Thanks Left Message VS

## 2025-04-09 ENCOUNTER — OFFICE VISIT (OUTPATIENT)
Dept: UROLOGY | Facility: CLINIC | Age: OVER 89
End: 2025-04-09
Payer: MEDICARE

## 2025-04-09 VITALS
HEART RATE: 62 BPM | WEIGHT: 164.88 LBS | BODY MASS INDEX: 23.6 KG/M2 | DIASTOLIC BLOOD PRESSURE: 82 MMHG | SYSTOLIC BLOOD PRESSURE: 156 MMHG | HEIGHT: 70 IN

## 2025-04-09 DIAGNOSIS — R53.1 WEAKNESS: ICD-10-CM

## 2025-04-09 DIAGNOSIS — N13.8 ENLARGED PROSTATE WITH URINARY OBSTRUCTION: ICD-10-CM

## 2025-04-09 DIAGNOSIS — N40.1 ENLARGED PROSTATE WITH URINARY OBSTRUCTION: ICD-10-CM

## 2025-04-09 DIAGNOSIS — R33.9 URINARY RETENTION: Primary | ICD-10-CM

## 2025-04-09 PROCEDURE — 99999 PR PBB SHADOW E&M-EST. PATIENT-LVL III: CPT | Mod: PBBFAC,,,

## 2025-04-09 NOTE — PROGRESS NOTES
CHIEF COMPLAINT:  Urinary retention      HISTORY OF PRESENTING ILLINESS:  Dimitri Johnson is a 89 y.o. male established patient of Dr. Moon. He was seen in office last on 3/5/2025 for cath change. He is here today with his caregiver for monthly cath change. He voices no  concerns. He denies fever, chills, and hematuria.      He resides at an assisted living facility.         REVIEW OF SYSTEMS:  Review of Systems   Constitutional:  Negative for chills and fever.   Respiratory:  Negative for cough and shortness of breath.    Cardiovascular:  Negative for chest pain and palpitations.   Gastrointestinal:  Negative for abdominal pain, nausea and vomiting.   Genitourinary:         See HPI   Neurological:  Positive for weakness. Negative for dizziness and headaches.         PATIENT HISTORY:    Past Medical History:   Diagnosis Date    Glaucoma     Hypertension        Past Surgical History:   Procedure Laterality Date    CORNEAL TRANSPLANT Right     CORNEAL TRANSPLANT Left 7/17/14    [facility]       No family history on file.    Social History     Socioeconomic History    Marital status:    Tobacco Use    Smoking status: Never   Substance and Sexual Activity    Alcohol use: Yes     Comment: once a month     Social Drivers of Health     Financial Resource Strain: Low Risk  (12/23/2023)    Overall Financial Resource Strain (CARDIA)     Difficulty of Paying Living Expenses: Not hard at all   Food Insecurity: No Food Insecurity (12/23/2023)    Hunger Vital Sign     Worried About Running Out of Food in the Last Year: Never true     Ran Out of Food in the Last Year: Never true   Transportation Needs: No Transportation Needs (12/23/2023)    PRAPARE - Transportation     Lack of Transportation (Medical): No     Lack of Transportation (Non-Medical): No   Physical Activity: Inactive (12/23/2023)    Exercise Vital Sign     Days of Exercise per Week: 0 days     Minutes of Exercise per Session: 0 min   Stress: No Stress Concern Present  (12/23/2023)    [employer] - Occupational Stress Questionnaire     Feeling of Stress : Only a little   Housing Stability: Unknown (12/23/2023)    Housing Stability Vital Sign     Unable to Pay for Housing in the Last Year: No     Unstable Housing in the Last Year: No       Allergies:  Pcn [penicillins]    Medications:    Current Outpatient Medications:     acetaminophen (TYLENOL) 500 MG tablet, Take 2 tablets (1,000 mg total) by mouth every 8 (eight) hours as needed for Pain., Disp: , Rfl: 0    ascorbic acid, vitamin C, (VITAMIN C) 500 MG tablet, Take 1 tablet (500 mg total) by mouth 2 (two) times daily., Disp: , Rfl:     aspirin 81 MG Chew, Take 81 mg by mouth once daily., Disp: , Rfl:     bisacodyL (DULCOLAX) 10 mg Supp, Place 1 suppository (10 mg total) rectally daily as needed (constipation)., Disp: 10 suppository, Rfl: 0    finasteride (PROSCAR) 5 mg tablet, Take 5 mg by mouth once daily., Disp: , Rfl:     gabapentin (NEURONTIN) 100 MG capsule, Take 100 mg by mouth 2 (two) times daily., Disp: , Rfl:     melatonin (MELATIN) 3 mg tablet, Take 2 tablets (6 mg total) by mouth nightly., Disp: , Rfl: 0    methocarbamoL (ROBAXIN) 500 MG Tab, Take by mouth., Disp: , Rfl:     polyethylene glycol (GLYCOLAX) 17 gram PwPk, Take 17 g by mouth 2 (two) times daily., Disp: , Rfl: 0    senna-docusate 8.6-50 mg (PERICOLACE) 8.6-50 mg per tablet, Take 2 tablets by mouth 2 (two) times daily., Disp: , Rfl:     zinc sulfate (ZINCATE) 50 mg zinc (220 mg) capsule, Take 1 capsule (220 mg total) by mouth once daily., Disp: , Rfl:     calcium carbonate (TUMS) 200 mg calcium (500 mg) chewable tablet, Take 1 tablet (500 mg total) by mouth 2 (two) times daily as needed for Heartburn., Disp: , Rfl:     calcium-vitamin D3 (OS-WILLIE 500 + D3) 500 mg-5 mcg (200 unit) per tablet, Take 2 tablets by mouth once daily., Disp: 60 tablet, Rfl: 11    mirtazapine (REMERON) 7.5 MG Tab, Take 1 tablet (7.5 mg total) by mouth  every evening., Disp: 30 tablet, Rfl: 11    PHYSICAL EXAMINATION:  Physical Exam  HENT:      Head: Normocephalic and atraumatic.   Eyes:      Pupils: Pupils are equal, round, and reactive to light.   Pulmonary:      Effort: Pulmonary effort is normal. No respiratory distress.   Musculoskeletal:      Comments: Wheelchair bound   Skin:     General: Skin is warm and dry.   Neurological:      General: No focal deficit present.      Mental Status: He is alert and oriented to person, place, and time.      Motor: Weakness present.   Psychiatric:         Mood and Affect: Mood normal.         Behavior: Behavior normal.         Lab Results   Component Value Date    CREATININE 1.2 2024    EGFRNORACEVR 57.4 (A) 2024               IMPRESSION:    Encounter Diagnoses   Name Primary?    Urinary retention Yes    Enlarged prostate with urinary obstruction     Weakness                  Assessment:       1. Urinary retention    2. Enlarged prostate with urinary obstruction    3. Weakness                Plan:   Nurse Derrell identified using two identifiers (name and ). Allergies checked. Ramirez cath currently in place was discontinued by deflating ballon using 10cc syringe. Genitalia prepped and draped in a sterile fashion. 10cc of 2% lidocaine jelly used for local analgesia.16fr straight tip catheter placed without difficulty. Ballon inflated with 10cc of sterile water. Patient tolerated procedure without complication. He was instructed on catheter care and escorted to lobby in wheelchair.       -RTC 1 month for catheter change     I spent a total of 30 minutes on the day of the visit. This includes face to face time and non-face to face time preparing to see the patient (eg, review of tests), obtaining and/or reviewing separately obtained history, documenting clinical information in the electronic or other health record, independently interpreting results and communicating results to the patient/family/caregiver, or care  coordinator  Reviewed the possible contributory factors.

## 2025-05-15 ENCOUNTER — OFFICE VISIT (OUTPATIENT)
Dept: UROLOGY | Facility: CLINIC | Age: OVER 89
End: 2025-05-15
Payer: MEDICARE

## 2025-05-15 VITALS
SYSTOLIC BLOOD PRESSURE: 159 MMHG | WEIGHT: 164.88 LBS | BODY MASS INDEX: 23.6 KG/M2 | HEART RATE: 82 BPM | DIASTOLIC BLOOD PRESSURE: 66 MMHG | HEIGHT: 70 IN

## 2025-05-15 DIAGNOSIS — N13.8 ENLARGED PROSTATE WITH URINARY OBSTRUCTION: ICD-10-CM

## 2025-05-15 DIAGNOSIS — R33.9 URINARY RETENTION: Primary | ICD-10-CM

## 2025-05-15 DIAGNOSIS — N40.1 ENLARGED PROSTATE WITH URINARY OBSTRUCTION: ICD-10-CM

## 2025-05-15 PROCEDURE — 99214 OFFICE O/P EST MOD 30 MIN: CPT | Mod: S$GLB,,,

## 2025-05-15 PROCEDURE — 1160F RVW MEDS BY RX/DR IN RCRD: CPT | Mod: CPTII,S$GLB,,

## 2025-05-15 PROCEDURE — 99999 PR PBB SHADOW E&M-EST. PATIENT-LVL III: CPT | Mod: PBBFAC,,,

## 2025-05-15 PROCEDURE — 1159F MED LIST DOCD IN RCRD: CPT | Mod: CPTII,S$GLB,,

## 2025-05-15 NOTE — PROGRESS NOTES
CHIEF COMPLAINT:  Urinary retention      HISTORY OF PRESENTING ILLINESS:  Dimitri Johnson is a 89 y.o. male established patient of Dr. Moon. He was seen in office last on 4/9/2025 for cath change. He is here today with his caregiver for monthly cath change.   He denies fever, chills, and hematuria.      He resides at an assisted living facility.         REVIEW OF SYSTEMS:  Review of Systems   Constitutional:  Negative for chills and fever.   Respiratory:  Negative for cough and shortness of breath.    Cardiovascular:  Negative for chest pain and palpitations.   Gastrointestinal:  Negative for abdominal pain, nausea and vomiting.   Genitourinary:         See HPI   Neurological:  Positive for weakness. Negative for dizziness and headaches.         PATIENT HISTORY:    Past Medical History:   Diagnosis Date    Glaucoma     Hypertension        Past Surgical History:   Procedure Laterality Date    CORNEAL TRANSPLANT Right     CORNEAL TRANSPLANT Left 7/17/14    [facility]       No family history on file.    Social History     Socioeconomic History    Marital status:    Tobacco Use    Smoking status: Never   Substance and Sexual Activity    Alcohol use: Yes     Comment: once a month     Social Drivers of Health     Financial Resource Strain: Low Risk  (12/23/2023)    Overall Financial Resource Strain (CARDIA)     Difficulty of Paying Living Expenses: Not hard at all   Food Insecurity: No Food Insecurity (12/23/2023)    Hunger Vital Sign     Worried About Running Out of Food in the Last Year: Never true     Ran Out of Food in the Last Year: Never true   Transportation Needs: No Transportation Needs (12/23/2023)    PRAPARE - Transportation     Lack of Transportation (Medical): No     Lack of Transportation (Non-Medical): No   Physical Activity: Inactive (12/23/2023)    Exercise Vital Sign     Days of Exercise per Week: 0 days     Minutes of Exercise per Session: 0 min   Stress: No Stress Concern Present (12/23/2023)    [employer] - Occupational Stress Questionnaire     Feeling of Stress : Only a little   Housing Stability: Unknown (12/23/2023)    Housing Stability Vital Sign     Unable to Pay for Housing in the Last Year: No     Unstable Housing in the Last Year: No       Allergies:  Pcn [penicillins]    Medications:    Current Outpatient Medications:     acetaminophen (TYLENOL) 500 MG tablet, Take 2 tablets (1,000 mg total) by mouth every 8 (eight) hours as needed for Pain., Disp: , Rfl: 0    ascorbic acid, vitamin C, (VITAMIN C) 500 MG tablet, Take 1 tablet (500 mg total) by mouth 2 (two) times daily., Disp: , Rfl:     aspirin 81 MG Chew, Take 81 mg by mouth once daily., Disp: , Rfl:     bisacodyL (DULCOLAX) 10 mg Supp, Place 1 suppository (10 mg total) rectally daily as needed (constipation)., Disp: 10 suppository, Rfl: 0    calcium carbonate (TUMS) 200 mg calcium (500 mg) chewable tablet, Take 1 tablet (500 mg total) by mouth 2 (two) times daily as needed for Heartburn., Disp: , Rfl:     calcium-vitamin D3 (OS-WILLIE 500 + D3) 500 mg-5 mcg (200 unit) per tablet, Take 2 tablets by mouth once daily., Disp: 60 tablet, Rfl: 11    finasteride (PROSCAR) 5 mg tablet, Take 5 mg by mouth once daily., Disp: , Rfl:     gabapentin (NEURONTIN) 100 MG capsule, Take 100 mg by mouth 2 (two) times daily., Disp: , Rfl:     melatonin (MELATIN) 3 mg tablet, Take 2 tablets (6 mg total) by mouth nightly., Disp: , Rfl: 0    methocarbamoL (ROBAXIN) 500 MG Tab, Take by mouth., Disp: , Rfl:     mirtazapine (REMERON) 7.5 MG Tab, Take 1 tablet (7.5 mg total) by mouth every evening., Disp: 30 tablet, Rfl: 11    polyethylene glycol (GLYCOLAX) 17 gram PwPk, Take 17 g by mouth 2 (two) times daily., Disp: , Rfl: 0    senna-docusate 8.6-50 mg (PERICOLACE) 8.6-50 mg per tablet, Take 2 tablets by mouth 2 (two) times daily., Disp: , Rfl:     zinc sulfate (ZINCATE) 50 mg zinc (220 mg) capsule, Take 1 capsule (220 mg total) by mouth once daily.,  Disp: , Rfl:     PHYSICAL EXAMINATION:  Physical Exam  HENT:      Head: Normocephalic and atraumatic.   Eyes:      Pupils: Pupils are equal, round, and reactive to light.   Pulmonary:      Effort: Pulmonary effort is normal. No respiratory distress.   Genitourinary:     Comments: Redness to penis and scrotum; ointment applied  Musculoskeletal:      Comments: Wheelchair bound   Skin:     General: Skin is warm and dry.   Neurological:      General: No focal deficit present.      Mental Status: He is alert and oriented to person, place, and time.      Motor: Weakness present.   Psychiatric:         Mood and Affect: Mood normal.         Behavior: Behavior normal.         Lab Results   Component Value Date    CREATININE 1.2 2024    EGFRNORACEVR 57.4 (A) 2024               IMPRESSION:    Encounter Diagnoses   Name Primary?    Urinary retention Yes    Enlarged prostate with urinary obstruction                    Assessment:       1. Urinary retention    2. Enlarged prostate with urinary obstruction                  Plan:   Nurse Ene identified using two identifiers (name and ). Allergies checked. Ramirez cath currently in place was discontinued by deflating ballon using 10cc syringe. Genitalia prepped and draped in a sterile fashion. 10cc of 2% lidocaine jelly used for local analgesia.16fr straight tip catheter placed without difficulty. Ballon inflated with 10cc of sterile water. Patient tolerated procedure without complication. He was instructed on catheter care and escorted to lobby in wheelchair.       -RTC 1 month for catheter change     I spent a total of 30 minutes on the day of the visit. This includes face to face time and non-face to face time preparing to see the patient (eg, review of tests), obtaining and/or reviewing separately obtained history, documenting clinical information in the electronic or other health record, independently interpreting results and communicating results to the  patient/family/caregiver, or care coordinator  Reviewed the possible contributory factors.    The visit today is associated with current or anticipated ongoing medical care related to this patient's single serious condition/complex condition.

## 2025-05-15 NOTE — PROGRESS NOTES
Admitted via wheelchair for catheter change 16 fr gray catheter removed by withdrawing 10 cc water from balloon . Penis prepped with betadine using sterile technique a new 16 fr gray was instilled in bladder balloon inflated with 10 cc sterile water . Noted rash around penis and on scrotum bacitracin ointment applied to affected area   _                                                                                             Ene Cleveland LPN                                                                                              If any problems after hours or weekends, you may call [phone] and ask for the urology resident on call.

## 2025-06-16 ENCOUNTER — CLINICAL SUPPORT (OUTPATIENT)
Dept: UROLOGY | Facility: CLINIC | Age: OVER 89
End: 2025-06-16
Payer: MEDICARE

## 2025-06-16 VITALS
HEART RATE: 82 BPM | WEIGHT: 164.88 LBS | BODY MASS INDEX: 23.6 KG/M2 | HEIGHT: 70 IN | DIASTOLIC BLOOD PRESSURE: 83 MMHG | SYSTOLIC BLOOD PRESSURE: 141 MMHG

## 2025-06-16 DIAGNOSIS — R33.9 URINARY RETENTION: Primary | ICD-10-CM

## 2025-06-16 DIAGNOSIS — Z97.8 CHRONIC INDWELLING FOLEY CATHETER: ICD-10-CM

## 2025-06-16 PROCEDURE — 99999 PR PBB SHADOW E&M-EST. PATIENT-LVL III: CPT | Mod: PBBFAC,,,

## 2025-06-18 PROBLEM — Z97.8 CHRONIC INDWELLING FOLEY CATHETER: Status: ACTIVE | Noted: 2025-06-18

## 2025-07-16 ENCOUNTER — CLINICAL SUPPORT (OUTPATIENT)
Dept: UROLOGY | Facility: CLINIC | Age: OVER 89
End: 2025-07-16
Payer: MEDICARE

## 2025-07-16 VITALS
BODY MASS INDEX: 23.39 KG/M2 | WEIGHT: 163.38 LBS | HEIGHT: 70 IN | SYSTOLIC BLOOD PRESSURE: 147 MMHG | DIASTOLIC BLOOD PRESSURE: 79 MMHG | HEART RATE: 91 BPM

## 2025-07-16 DIAGNOSIS — R33.9 URINARY RETENTION: Primary | ICD-10-CM

## 2025-07-16 PROCEDURE — 99999 PR PBB SHADOW E&M-EST. PATIENT-LVL III: CPT | Mod: PBBFAC,,,

## 2025-08-13 ENCOUNTER — OFFICE VISIT (OUTPATIENT)
Dept: UROLOGY | Facility: CLINIC | Age: OVER 89
End: 2025-08-13
Payer: MEDICARE

## 2025-08-13 VITALS
HEIGHT: 70 IN | SYSTOLIC BLOOD PRESSURE: 150 MMHG | BODY MASS INDEX: 23.39 KG/M2 | WEIGHT: 163.38 LBS | HEART RATE: 81 BPM | DIASTOLIC BLOOD PRESSURE: 83 MMHG

## 2025-08-13 DIAGNOSIS — N13.8 ENLARGED PROSTATE WITH URINARY OBSTRUCTION: ICD-10-CM

## 2025-08-13 DIAGNOSIS — R33.9 URINARY RETENTION: Primary | ICD-10-CM

## 2025-08-13 DIAGNOSIS — N40.1 ENLARGED PROSTATE WITH URINARY OBSTRUCTION: ICD-10-CM

## 2025-08-13 PROCEDURE — 3288F FALL RISK ASSESSMENT DOCD: CPT | Mod: CPTII,S$GLB,,

## 2025-08-13 PROCEDURE — 99999 PR PBB SHADOW E&M-EST. PATIENT-LVL III: CPT | Mod: PBBFAC,,,

## 2025-08-13 PROCEDURE — 1101F PT FALLS ASSESS-DOCD LE1/YR: CPT | Mod: CPTII,S$GLB,,

## 2025-08-13 PROCEDURE — 51702 INSERT TEMP BLADDER CATH: CPT | Mod: S$GLB,,,

## 2025-08-13 PROCEDURE — 1126F AMNT PAIN NOTED NONE PRSNT: CPT | Mod: CPTII,S$GLB,,

## 2025-08-13 PROCEDURE — 99499 UNLISTED E&M SERVICE: CPT | Mod: S$GLB,,,

## 2025-08-13 PROCEDURE — 1159F MED LIST DOCD IN RCRD: CPT | Mod: CPTII,S$GLB,,
